# Patient Record
Sex: FEMALE | Race: WHITE | NOT HISPANIC OR LATINO
[De-identification: names, ages, dates, MRNs, and addresses within clinical notes are randomized per-mention and may not be internally consistent; named-entity substitution may affect disease eponyms.]

---

## 2017-01-16 ENCOUNTER — TRANSCRIPTION ENCOUNTER (OUTPATIENT)
Age: 67
End: 2017-01-16

## 2017-02-28 ENCOUNTER — MEDICATION RENEWAL (OUTPATIENT)
Age: 67
End: 2017-02-28

## 2017-03-06 ENCOUNTER — RX RENEWAL (OUTPATIENT)
Age: 67
End: 2017-03-06

## 2017-03-16 ENCOUNTER — EMERGENCY (EMERGENCY)
Facility: HOSPITAL | Age: 67
LOS: 1 days | Discharge: HOME | End: 2017-03-16
Attending: EMERGENCY MEDICINE | Admitting: EMERGENCY MEDICINE
Payer: COMMERCIAL

## 2017-03-16 ENCOUNTER — APPOINTMENT (OUTPATIENT)
Dept: INTERNAL MEDICINE | Facility: CLINIC | Age: 67
End: 2017-03-16

## 2017-03-16 VITALS
TEMPERATURE: 99 F | OXYGEN SATURATION: 100 % | HEART RATE: 102 BPM | HEIGHT: 62 IN | RESPIRATION RATE: 18 BRPM | SYSTOLIC BLOOD PRESSURE: 223 MMHG | DIASTOLIC BLOOD PRESSURE: 98 MMHG | WEIGHT: 251.99 LBS

## 2017-03-16 VITALS — DIASTOLIC BLOOD PRESSURE: 140 MMHG | SYSTOLIC BLOOD PRESSURE: 220 MMHG | HEART RATE: 104 BPM

## 2017-03-16 VITALS
TEMPERATURE: 97 F | OXYGEN SATURATION: 97 % | SYSTOLIC BLOOD PRESSURE: 169 MMHG | HEART RATE: 91 BPM | DIASTOLIC BLOOD PRESSURE: 82 MMHG | RESPIRATION RATE: 19 BRPM

## 2017-03-16 VITALS
TEMPERATURE: 98.8 F | BODY MASS INDEX: 46.93 KG/M2 | RESPIRATION RATE: 14 BRPM | HEART RATE: 111 BPM | WEIGHT: 255 LBS | HEIGHT: 62 IN | OXYGEN SATURATION: 97 %

## 2017-03-16 DIAGNOSIS — Z98.89 OTHER SPECIFIED POSTPROCEDURAL STATES: Chronic | ICD-10-CM

## 2017-03-16 DIAGNOSIS — M79.7 FIBROMYALGIA: ICD-10-CM

## 2017-03-16 DIAGNOSIS — M19.90 UNSPECIFIED OSTEOARTHRITIS, UNSPECIFIED SITE: ICD-10-CM

## 2017-03-16 DIAGNOSIS — I10 ESSENTIAL (PRIMARY) HYPERTENSION: ICD-10-CM

## 2017-03-16 DIAGNOSIS — Z79.84 LONG TERM (CURRENT) USE OF ORAL HYPOGLYCEMIC DRUGS: ICD-10-CM

## 2017-03-16 DIAGNOSIS — K21.9 GASTRO-ESOPHAGEAL REFLUX DISEASE WITHOUT ESOPHAGITIS: ICD-10-CM

## 2017-03-16 DIAGNOSIS — E11.9 TYPE 2 DIABETES MELLITUS WITHOUT COMPLICATIONS: ICD-10-CM

## 2017-03-16 DIAGNOSIS — I16.9 HYPERTENSIVE CRISIS, UNSPECIFIED: ICD-10-CM

## 2017-03-16 DIAGNOSIS — Z88.0 ALLERGY STATUS TO PENICILLIN: ICD-10-CM

## 2017-03-16 LAB
ALBUMIN SERPL ELPH-MCNC: 3.8 G/DL — SIGNIFICANT CHANGE UP (ref 3.3–5)
ALP SERPL-CCNC: 128 U/L — HIGH (ref 40–120)
ALT FLD-CCNC: 29 U/L — SIGNIFICANT CHANGE UP (ref 12–78)
ANION GAP SERPL CALC-SCNC: 10 MMOL/L — SIGNIFICANT CHANGE UP (ref 5–17)
APPEARANCE UR: CLEAR — SIGNIFICANT CHANGE UP
AST SERPL-CCNC: 16 U/L — SIGNIFICANT CHANGE UP (ref 15–37)
BACTERIA # UR AUTO: ABNORMAL
BASOPHILS # BLD AUTO: 0.1 K/UL — SIGNIFICANT CHANGE UP (ref 0–0.2)
BASOPHILS NFR BLD AUTO: 1.3 % — SIGNIFICANT CHANGE UP (ref 0–2)
BILIRUB SERPL-MCNC: 0.4 MG/DL — SIGNIFICANT CHANGE UP (ref 0.2–1.2)
BILIRUB UR-MCNC: NEGATIVE — SIGNIFICANT CHANGE UP
BUN SERPL-MCNC: 11 MG/DL — SIGNIFICANT CHANGE UP (ref 7–23)
CALCIUM SERPL-MCNC: 9.1 MG/DL — SIGNIFICANT CHANGE UP (ref 8.5–10.1)
CHLORIDE SERPL-SCNC: 101 MMOL/L — SIGNIFICANT CHANGE UP (ref 96–108)
CO2 SERPL-SCNC: 28 MMOL/L — SIGNIFICANT CHANGE UP (ref 22–31)
COLOR SPEC: YELLOW — SIGNIFICANT CHANGE UP
CREAT SERPL-MCNC: 0.8 MG/DL — SIGNIFICANT CHANGE UP (ref 0.5–1.3)
DIFF PNL FLD: ABNORMAL
EOSINOPHIL # BLD AUTO: 0.1 K/UL — SIGNIFICANT CHANGE UP (ref 0–0.5)
EOSINOPHIL NFR BLD AUTO: 1.1 % — SIGNIFICANT CHANGE UP (ref 0–6)
EPI CELLS # UR: SIGNIFICANT CHANGE UP
GLUCOSE SERPL-MCNC: 112 MG/DL — HIGH (ref 70–99)
GLUCOSE UR QL: NEGATIVE — SIGNIFICANT CHANGE UP
HCT VFR BLD CALC: 43 % — SIGNIFICANT CHANGE UP (ref 34.5–45)
HGB BLD-MCNC: 13.9 G/DL — SIGNIFICANT CHANGE UP (ref 11.5–15.5)
KETONES UR-MCNC: NEGATIVE — SIGNIFICANT CHANGE UP
LEUKOCYTE ESTERASE UR-ACNC: NEGATIVE — SIGNIFICANT CHANGE UP
LYMPHOCYTES # BLD AUTO: 1.7 K/UL — SIGNIFICANT CHANGE UP (ref 1–3.3)
LYMPHOCYTES # BLD AUTO: 19 % — SIGNIFICANT CHANGE UP (ref 13–44)
MCHC RBC-ENTMCNC: 28 PG — SIGNIFICANT CHANGE UP (ref 27–34)
MCHC RBC-ENTMCNC: 32.4 GM/DL — SIGNIFICANT CHANGE UP (ref 32–36)
MCV RBC AUTO: 86.4 FL — SIGNIFICANT CHANGE UP (ref 80–100)
MONOCYTES # BLD AUTO: 0.5 K/UL — SIGNIFICANT CHANGE UP (ref 0–0.9)
MONOCYTES NFR BLD AUTO: 5.5 % — SIGNIFICANT CHANGE UP (ref 1–9)
NEUTROPHILS # BLD AUTO: 6.6 K/UL — SIGNIFICANT CHANGE UP (ref 1.8–7.4)
NEUTROPHILS NFR BLD AUTO: 73 % — SIGNIFICANT CHANGE UP (ref 43–77)
NITRITE UR-MCNC: NEGATIVE — SIGNIFICANT CHANGE UP
PH UR: 6.5 — SIGNIFICANT CHANGE UP (ref 4.8–8)
PLATELET # BLD AUTO: 326 K/UL — SIGNIFICANT CHANGE UP (ref 150–400)
POTASSIUM SERPL-MCNC: 3.9 MMOL/L — SIGNIFICANT CHANGE UP (ref 3.5–5.3)
POTASSIUM SERPL-SCNC: 3.9 MMOL/L — SIGNIFICANT CHANGE UP (ref 3.5–5.3)
PROT SERPL-MCNC: 7.6 G/DL — SIGNIFICANT CHANGE UP (ref 6–8.3)
PROT UR-MCNC: NEGATIVE — SIGNIFICANT CHANGE UP
RBC # BLD: 4.98 M/UL — SIGNIFICANT CHANGE UP (ref 3.8–5.2)
RBC # FLD: 12.2 % — SIGNIFICANT CHANGE UP (ref 10.3–14.5)
RBC CASTS # UR COMP ASSIST: SIGNIFICANT CHANGE UP /HPF (ref 0–4)
SODIUM SERPL-SCNC: 139 MMOL/L — SIGNIFICANT CHANGE UP (ref 135–145)
SP GR SPEC: 1 — LOW (ref 1.01–1.02)
TROPONIN I SERPL-MCNC: <.015 NG/ML — SIGNIFICANT CHANGE UP (ref 0.01–0.04)
UROBILINOGEN FLD QL: NEGATIVE — SIGNIFICANT CHANGE UP
WBC # BLD: 9.1 K/UL — SIGNIFICANT CHANGE UP (ref 3.8–10.5)
WBC # FLD AUTO: 9.1 K/UL — SIGNIFICANT CHANGE UP (ref 3.8–10.5)
WBC UR QL: SIGNIFICANT CHANGE UP

## 2017-03-16 PROCEDURE — 93010 ELECTROCARDIOGRAM REPORT: CPT

## 2017-03-16 PROCEDURE — 71010: CPT | Mod: 26

## 2017-03-16 PROCEDURE — 70450 CT HEAD/BRAIN W/O DYE: CPT | Mod: 26

## 2017-03-16 PROCEDURE — 99285 EMERGENCY DEPT VISIT HI MDM: CPT

## 2017-03-16 RX ORDER — MOXIFLOXACIN HYDROCHLORIDE TABLETS, 400 MG 400 MG/1
400 TABLET, FILM COATED ORAL
Qty: 10 | Refills: 0 | Status: DISCONTINUED | COMMUNITY
Start: 2017-02-02

## 2017-03-16 RX ORDER — TRAMADOL HYDROCHLORIDE 50 MG/1
2 TABLET ORAL
Qty: 24 | Refills: 0 | OUTPATIENT
Start: 2017-03-16 | End: 2017-03-19

## 2017-03-16 RX ORDER — CLARITHROMYCIN 500 MG/1
500 TABLET, FILM COATED ORAL
Qty: 20 | Refills: 0 | Status: DISCONTINUED | COMMUNITY
Start: 2017-01-19

## 2017-03-16 RX ORDER — HYDRALAZINE HCL 50 MG
10 TABLET ORAL ONCE
Qty: 0 | Refills: 0 | Status: COMPLETED | OUTPATIENT
Start: 2017-03-16 | End: 2017-03-16

## 2017-03-16 RX ORDER — SODIUM CHLORIDE 9 MG/ML
1000 INJECTION INTRAMUSCULAR; INTRAVENOUS; SUBCUTANEOUS ONCE
Qty: 0 | Refills: 0 | Status: COMPLETED | OUTPATIENT
Start: 2017-03-16 | End: 2017-03-16

## 2017-03-16 RX ORDER — SODIUM CHLORIDE 9 MG/ML
3 INJECTION INTRAMUSCULAR; INTRAVENOUS; SUBCUTANEOUS ONCE
Qty: 0 | Refills: 0 | Status: COMPLETED | OUTPATIENT
Start: 2017-03-16 | End: 2017-03-16

## 2017-03-16 RX ADMIN — Medication 10 MILLIGRAM(S): at 16:57

## 2017-03-16 RX ADMIN — SODIUM CHLORIDE 2000 MILLILITER(S): 9 INJECTION INTRAMUSCULAR; INTRAVENOUS; SUBCUTANEOUS at 18:20

## 2017-03-16 RX ADMIN — SODIUM CHLORIDE 3 MILLILITER(S): 9 INJECTION INTRAMUSCULAR; INTRAVENOUS; SUBCUTANEOUS at 15:40

## 2017-03-16 RX ADMIN — Medication 0.1 MILLIGRAM(S): at 18:21

## 2017-03-16 NOTE — ED ADULT TRIAGE NOTE - CHIEF COMPLAINT QUOTE
" I went to my PMD for UTI and he found I had high blood pressure. pt with complaints of dizziness and headache.

## 2017-03-16 NOTE — ED PROVIDER NOTE - PROGRESS NOTE DETAILS
Pt headache improved with bp management, but still with pain, on exam paraspinal muscle spasm. pain meds, fu pmd tomorrow to reevaluate bp

## 2017-03-16 NOTE — ED PROVIDER NOTE - MEDICAL DECISION MAKING DETAILS
pt with severe htn, ha, check head ct, bp management, check ekg, cr. reeval pt with severe htn, ha, check head ct, bp management, check ekg, cr. reeval--bp improved, neck spasms tx with pain meds, fu pmd in am

## 2017-03-16 NOTE — ED PROVIDER NOTE - OBJECTIVE STATEMENT
Pt is a 67 yo female hx sinus dz, fibromyalgia, hypertension. pt pw one week of increasing diffuse headache, no n/v, blurred vision, numbness or weakness, no cp, sob, edema, abd pain

## 2017-03-16 NOTE — ED ADULT NURSE REASSESSMENT NOTE - NS ED NURSE REASSESS COMMENT FT1
Pt requests to take own Xanax and Tylenol, OK per Dr. Delgado
Pt. received from JAYLEN Bangura in [facility], states she has neck carrol, Vs as noted, pt medicated for pain per order

## 2017-03-16 NOTE — ED ADULT NURSE NOTE - PMH
Diabetes mellitus    Fibromyalgia    GERD (gastroesophageal reflux disease)    Hypertension    Hyponatremia    Osteoarthritis    Rhinosinusitis

## 2017-03-16 NOTE — ED PROVIDER NOTE - ENMT, MLM
Airway patent, Nasal mucosa clear. Mouth with normal mucosa. Throat has no vesicles, no oropharyngeal exudates and uvula is midline. sinus nt

## 2017-03-16 NOTE — ED PROVIDER NOTE - CHPI ED SYMPTOMS NEG
no dizziness/no confusion/no nausea/no fever/no vomiting/no loss of consciousness/no change in level of consciousness/no blurred vision

## 2017-03-17 ENCOUNTER — INPATIENT (INPATIENT)
Facility: HOSPITAL | Age: 67
LOS: 3 days | Discharge: HOME | DRG: 280 | End: 2017-03-21
Attending: HOSPITALIST | Admitting: HOSPITALIST
Payer: COMMERCIAL

## 2017-03-17 VITALS
RESPIRATION RATE: 15 BRPM | HEIGHT: 62 IN | TEMPERATURE: 97 F | SYSTOLIC BLOOD PRESSURE: 180 MMHG | HEART RATE: 90 BPM | OXYGEN SATURATION: 97 % | WEIGHT: 250 LBS | DIASTOLIC BLOOD PRESSURE: 110 MMHG

## 2017-03-17 DIAGNOSIS — Z98.89 OTHER SPECIFIED POSTPROCEDURAL STATES: Chronic | ICD-10-CM

## 2017-03-17 DIAGNOSIS — I16.1 HYPERTENSIVE EMERGENCY: ICD-10-CM

## 2017-03-17 DIAGNOSIS — E11.9 TYPE 2 DIABETES MELLITUS WITHOUT COMPLICATIONS: ICD-10-CM

## 2017-03-17 DIAGNOSIS — M79.7 FIBROMYALGIA: ICD-10-CM

## 2017-03-17 DIAGNOSIS — Z41.8 ENCOUNTER FOR OTHER PROCEDURES FOR PURPOSES OTHER THAN REMEDYING HEALTH STATE: ICD-10-CM

## 2017-03-17 DIAGNOSIS — E87.1 HYPO-OSMOLALITY AND HYPONATREMIA: ICD-10-CM

## 2017-03-17 DIAGNOSIS — K21.9 GASTRO-ESOPHAGEAL REFLUX DISEASE WITHOUT ESOPHAGITIS: ICD-10-CM

## 2017-03-17 LAB
ALBUMIN SERPL ELPH-MCNC: 3.9 G/DL — SIGNIFICANT CHANGE UP (ref 3.3–5)
ALP SERPL-CCNC: 130 U/L — HIGH (ref 40–120)
ALT FLD-CCNC: 33 U/L — SIGNIFICANT CHANGE UP (ref 12–78)
ANION GAP SERPL CALC-SCNC: 14 MMOL/L — SIGNIFICANT CHANGE UP (ref 5–17)
AST SERPL-CCNC: 23 U/L — SIGNIFICANT CHANGE UP (ref 15–37)
BASOPHILS # BLD AUTO: 0.1 K/UL — SIGNIFICANT CHANGE UP (ref 0–0.2)
BASOPHILS NFR BLD AUTO: 0.8 % — SIGNIFICANT CHANGE UP (ref 0–2)
BILIRUB SERPL-MCNC: 0.6 MG/DL — SIGNIFICANT CHANGE UP (ref 0.2–1.2)
BUN SERPL-MCNC: 12 MG/DL — SIGNIFICANT CHANGE UP (ref 7–23)
CALCIUM SERPL-MCNC: 9.2 MG/DL — SIGNIFICANT CHANGE UP (ref 8.5–10.1)
CHLORIDE SERPL-SCNC: 92 MMOL/L — LOW (ref 96–108)
CO2 SERPL-SCNC: 23 MMOL/L — SIGNIFICANT CHANGE UP (ref 22–31)
CREAT SERPL-MCNC: 0.69 MG/DL — SIGNIFICANT CHANGE UP (ref 0.5–1.3)
CULTURE RESULTS: SIGNIFICANT CHANGE UP
EOSINOPHIL # BLD AUTO: 0 K/UL — SIGNIFICANT CHANGE UP (ref 0–0.5)
EOSINOPHIL NFR BLD AUTO: 0 % — SIGNIFICANT CHANGE UP (ref 0–6)
GLUCOSE SERPL-MCNC: 171 MG/DL — HIGH (ref 70–99)
HCT VFR BLD CALC: 41.4 % — SIGNIFICANT CHANGE UP (ref 34.5–45)
HGB BLD-MCNC: 14.3 G/DL — SIGNIFICANT CHANGE UP (ref 11.5–15.5)
LYMPHOCYTES # BLD AUTO: 1.1 K/UL — SIGNIFICANT CHANGE UP (ref 1–3.3)
LYMPHOCYTES # BLD AUTO: 10.3 % — LOW (ref 13–44)
MCHC RBC-ENTMCNC: 28.8 PG — SIGNIFICANT CHANGE UP (ref 27–34)
MCHC RBC-ENTMCNC: 34.5 GM/DL — SIGNIFICANT CHANGE UP (ref 32–36)
MCV RBC AUTO: 83.4 FL — SIGNIFICANT CHANGE UP (ref 80–100)
MONOCYTES # BLD AUTO: 0.3 K/UL — SIGNIFICANT CHANGE UP (ref 0–0.9)
MONOCYTES NFR BLD AUTO: 2.9 % — SIGNIFICANT CHANGE UP (ref 1–9)
NEUTROPHILS # BLD AUTO: 9.2 K/UL — HIGH (ref 1.8–7.4)
NEUTROPHILS NFR BLD AUTO: 86 % — HIGH (ref 43–77)
PLATELET # BLD AUTO: 307 K/UL — SIGNIFICANT CHANGE UP (ref 150–400)
POTASSIUM SERPL-MCNC: 3.8 MMOL/L — SIGNIFICANT CHANGE UP (ref 3.5–5.3)
POTASSIUM SERPL-SCNC: 3.8 MMOL/L — SIGNIFICANT CHANGE UP (ref 3.5–5.3)
PROT SERPL-MCNC: 7.5 G/DL — SIGNIFICANT CHANGE UP (ref 6–8.3)
RBC # BLD: 4.96 M/UL — SIGNIFICANT CHANGE UP (ref 3.8–5.2)
RBC # FLD: 11.6 % — SIGNIFICANT CHANGE UP (ref 10.3–14.5)
SODIUM SERPL-SCNC: 129 MMOL/L — LOW (ref 135–145)
SPECIMEN SOURCE: SIGNIFICANT CHANGE UP
TROPONIN I SERPL-MCNC: <.015 NG/ML — SIGNIFICANT CHANGE UP (ref 0.01–0.04)
WBC # BLD: 10.7 K/UL — HIGH (ref 3.8–10.5)
WBC # FLD AUTO: 10.7 K/UL — HIGH (ref 3.8–10.5)

## 2017-03-17 PROCEDURE — 99223 1ST HOSP IP/OBS HIGH 75: CPT | Mod: AI,GC

## 2017-03-17 PROCEDURE — 99291 CRITICAL CARE FIRST HOUR: CPT

## 2017-03-17 PROCEDURE — 99285 EMERGENCY DEPT VISIT HI MDM: CPT

## 2017-03-17 PROCEDURE — 93010 ELECTROCARDIOGRAM REPORT: CPT

## 2017-03-17 PROCEDURE — 71010: CPT | Mod: 26

## 2017-03-17 PROCEDURE — 70450 CT HEAD/BRAIN W/O DYE: CPT | Mod: 26

## 2017-03-17 RX ORDER — SODIUM CHLORIDE 9 MG/ML
1000 INJECTION INTRAMUSCULAR; INTRAVENOUS; SUBCUTANEOUS
Qty: 0 | Refills: 0 | Status: DISCONTINUED | OUTPATIENT
Start: 2017-03-17 | End: 2017-03-17

## 2017-03-17 RX ORDER — DEXTROSE 50 % IN WATER 50 %
25 SYRINGE (ML) INTRAVENOUS ONCE
Qty: 0 | Refills: 0 | Status: DISCONTINUED | OUTPATIENT
Start: 2017-03-17 | End: 2017-03-21

## 2017-03-17 RX ORDER — ENOXAPARIN SODIUM 100 MG/ML
40 INJECTION SUBCUTANEOUS DAILY
Qty: 0 | Refills: 0 | Status: DISCONTINUED | OUTPATIENT
Start: 2017-03-17 | End: 2017-03-18

## 2017-03-17 RX ORDER — HYDRALAZINE HCL 50 MG
20 TABLET ORAL ONCE
Qty: 0 | Refills: 0 | Status: COMPLETED | OUTPATIENT
Start: 2017-03-17 | End: 2017-03-17

## 2017-03-17 RX ORDER — NICARDIPINE HYDROCHLORIDE 30 MG/1
5 CAPSULE, EXTENDED RELEASE ORAL
Qty: 50 | Refills: 0 | Status: DISCONTINUED | OUTPATIENT
Start: 2017-03-17 | End: 2017-03-18

## 2017-03-17 RX ORDER — INSULIN LISPRO 100/ML
VIAL (ML) SUBCUTANEOUS
Qty: 0 | Refills: 0 | Status: DISCONTINUED | OUTPATIENT
Start: 2017-03-17 | End: 2017-03-21

## 2017-03-17 RX ORDER — LABETALOL HCL 100 MG
200 TABLET ORAL
Qty: 0 | Refills: 0 | Status: DISCONTINUED | OUTPATIENT
Start: 2017-03-17 | End: 2017-03-18

## 2017-03-17 RX ORDER — HYDRALAZINE HCL 50 MG
50 TABLET ORAL
Qty: 0 | Refills: 0 | Status: DISCONTINUED | OUTPATIENT
Start: 2017-03-17 | End: 2017-03-18

## 2017-03-17 RX ORDER — FAMOTIDINE 10 MG/ML
20 INJECTION INTRAVENOUS ONCE
Qty: 0 | Refills: 0 | Status: COMPLETED | OUTPATIENT
Start: 2017-03-17 | End: 2017-03-17

## 2017-03-17 RX ORDER — ONDANSETRON 8 MG/1
4 TABLET, FILM COATED ORAL ONCE
Qty: 0 | Refills: 0 | Status: COMPLETED | OUTPATIENT
Start: 2017-03-17 | End: 2017-03-17

## 2017-03-17 RX ORDER — SODIUM CHLORIDE 9 MG/ML
1000 INJECTION, SOLUTION INTRAVENOUS
Qty: 0 | Refills: 0 | Status: DISCONTINUED | OUTPATIENT
Start: 2017-03-17 | End: 2017-03-21

## 2017-03-17 RX ORDER — GLUCAGON INJECTION, SOLUTION 0.5 MG/.1ML
1 INJECTION, SOLUTION SUBCUTANEOUS ONCE
Qty: 0 | Refills: 0 | Status: DISCONTINUED | OUTPATIENT
Start: 2017-03-17 | End: 2017-03-21

## 2017-03-17 RX ORDER — SODIUM CHLORIDE 9 MG/ML
3 INJECTION INTRAMUSCULAR; INTRAVENOUS; SUBCUTANEOUS ONCE
Qty: 0 | Refills: 0 | Status: COMPLETED | OUTPATIENT
Start: 2017-03-17 | End: 2017-03-17

## 2017-03-17 RX ORDER — MORPHINE SULFATE 50 MG/1
4 CAPSULE, EXTENDED RELEASE ORAL ONCE
Qty: 0 | Refills: 0 | Status: DISCONTINUED | OUTPATIENT
Start: 2017-03-17 | End: 2017-03-17

## 2017-03-17 RX ORDER — DEXTROSE 50 % IN WATER 50 %
12.5 SYRINGE (ML) INTRAVENOUS ONCE
Qty: 0 | Refills: 0 | Status: DISCONTINUED | OUTPATIENT
Start: 2017-03-17 | End: 2017-03-21

## 2017-03-17 RX ORDER — DEXTROSE 50 % IN WATER 50 %
1 SYRINGE (ML) INTRAVENOUS ONCE
Qty: 0 | Refills: 0 | Status: DISCONTINUED | OUTPATIENT
Start: 2017-03-17 | End: 2017-03-21

## 2017-03-17 RX ADMIN — FAMOTIDINE 20 MILLIGRAM(S): 10 INJECTION INTRAVENOUS at 16:01

## 2017-03-17 RX ADMIN — Medication 1 MILLIGRAM(S): at 16:01

## 2017-03-17 RX ADMIN — ONDANSETRON 4 MILLIGRAM(S): 8 TABLET, FILM COATED ORAL at 16:02

## 2017-03-17 RX ADMIN — Medication 0.2 MILLIGRAM(S): at 16:04

## 2017-03-17 RX ADMIN — SODIUM CHLORIDE 125 MILLILITER(S): 9 INJECTION INTRAMUSCULAR; INTRAVENOUS; SUBCUTANEOUS at 16:02

## 2017-03-17 RX ADMIN — NICARDIPINE HYDROCHLORIDE 25 MG/HR: 30 CAPSULE, EXTENDED RELEASE ORAL at 16:45

## 2017-03-17 RX ADMIN — Medication 200 MILLIGRAM(S): at 22:13

## 2017-03-17 RX ADMIN — Medication 20 MILLIGRAM(S): at 16:02

## 2017-03-17 RX ADMIN — Medication 50 MILLIGRAM(S): at 23:55

## 2017-03-17 RX ADMIN — SODIUM CHLORIDE 3 MILLILITER(S): 9 INJECTION INTRAMUSCULAR; INTRAVENOUS; SUBCUTANEOUS at 15:39

## 2017-03-17 NOTE — H&P ADULT. - RS GEN PE MLT RESP DETAILS PC
normal/respirations non-labored/airway patent/no chest wall tenderness/no wheezes/breath sounds equal

## 2017-03-17 NOTE — ED ADULT NURSE REASSESSMENT NOTE - NS ED NURSE REASSESS COMMENT FT1
Pt is drowsy but responds to her name easily. Pt updated to her ICU status and states ok. Pt's 's. HR 90's Pt remains on 2L NC 02 with 02 sat' s 98%. Pt denies c/o at present time. PIV #20g to her LH intact and patent with Cardene infusion running at 37.5 ml's/ 7.5 mg/hr. Pt to go for CT of Head and than to ICU bed 16.

## 2017-03-17 NOTE — H&P ADULT. - HISTORY OF PRESENT ILLNESS
64 yo F with a PMHx of: Morbidly obese with DM2, HTN, GERD, Fibromyalgia, OA, Chronic rhinosinusitis and hyponatremia presents to the ED today with her brother for lethargy and weakness.  The pt was very lethargic when examined so unable to obtain complete full HPI/ ROS from the patient- her brother was at bedside and provided most of the history.  The patient stated that she has a mild HA, feels generalized weakness, and very sleepy.  The pt stated that she doesn't have any CP, SOB, abdominal pain, N/V/D/C, LE edema or any other issues.  The patient states that she had 64 yo F with a PMHx of: Morbidly obese with DM2, HTN, GERD, Fibromyalgia, OA, Chronic rhinosinusitis and hyponatremia presents to the ED today with her brother for lethargy and weakness.  The pt was very lethargic when examined so unable to obtain complete full HPI/ ROS from the patient- her brother was at bedside and provided most of the history.  The patients brother  stated that she has a mild HA, feels generalized weakness, and very sleepy since yesterday.  The pts brother stated that the pt had been to the [facility] ED on  3/16/17 for HTN and was discharged the same day with a prescription for Toradol and needed to follow.  The pt stated that she doesn't have any CP, SOB, abdominal pain, N/V/D/C, LE edema or any other issues.    ED Vitals were: 97.5 F, 90 HR, 180/110 BP, 15 RA, 97% SpO2 RA  ED Labs: 64 yo F with a PMHx of: Morbidly obese with DM2, HTN, GERD, Fibromyalgia, OA, Chronic rhinosinusitis and hyponatremia presents to the ED today with her brother for lethargy and weakness.  The pt was very lethargic when examined so unable to obtain complete full HPI/ ROS from the patient- her brother was at bedside and provided most of the history.  The patients brother  stated that she has a mild HA, feels generalized weakness, and very sleepy since yesterday.  The pts brother stated that the pt had been to the [facility] ED on  3/16/17 for HTN and was discharged the same day with a prescription for Toradol and needed to follow.  The pt stated that she doesn't have any CP, SOB, abdominal pain, N/V/D/C, LE edema or any other issues.    ED Vitals were: 97.5 F, 90 HR, 180/110 BP, 15 RA, 97% SpO2 RA  ED Labs: WBC: 10.7; Na+: 129; glucose: 171; alk phos: 130  UA: neg nitrite, neg leuk esterase  CXR: Early CHF or fluid overload. No focal infiltrate  In ED, pt tx for HTN emergency and given: NS INF 125cc/hr, Hydralazine 50mg 4x/day, Labetalol 200mg BID, Nicardipine: 50mg 25cc/hr, CT head pending, TTE ordered.     CXR 62 yo F with a PMHx of: Morbidly obese with DM2, HTN, GERD, Fibromyalgia, OA, Chronic rhinosinusitis and hyponatremia presents to the ED today with her brother for lethargy and weakness.  The pt was very lethargic when examined so unable to obtain complete full HPI/ ROS from the patient- her brother was at bedside and provided most of the history.  The patients brother  stated that she has a mild HA, feels generalized weakness, and very sleepy since yesterday.  The pts brother stated that the pt had been to the [facility] ED on  3/16/17 for HTN and was discharged the same day with a prescription for Toradol and needed to followup with her PMD.  The pt stated that she doesn't have any Fever, Chills, Blurry vision, CP, SOB, abdominal pain, N/V/D/C, LE edema or any other issues. But history unreliable since pt was lethargic.   ED Vitals were: 97.5 F, 90 HR, 180/110 BP, 15 RA, 97% SpO2 RA  ED Labs: WBC: 10.7; Na+: 129; glucose: 171; alk phos: 130  UA: Neg nitrite, Neg leuk esterase   CXR: Early CHF or fluid overload. No focal infiltrate  In ED, pt tx for HTN emergency and given: NS INF 125cc/hr, Hydralazine 50mg 4x/day, Labetalol 200mg BID, Nicardipine: 50mg 25cc/hr, CT head pending, TTE ordered. 64 yo F with a PMHx of: Morbidly obese with DM2, HTN, GERD, Fibromyalgia, OA, Chronic rhinosinusitis and hyponatremia presents to the ED today with her brother for lethargy and weakness.  The pt was very lethargic when examined so unable to obtain complete full HPI/ ROS from the patient- her brother was at bedside and provided limited  history.  The patients brother  stated that she has a mild HA, feels generalized weakness, and very sleepy since yesterday.  The pts brother stated that the pt had been to the [facility] ED on  3/16/17 for HTN and was discharged the same day with a prescription for Toradol and needed to followup with her PMD.  The pt stated that she doesn't have any Fever, Chills, Blurry vision, CP, SOB, abdominal pain, N/V/D/C, LE edema or any other issues. But history unreliable since pt was very lethargic.   ED Vitals were: 97.5 F, 90 HR, 180/110 BP, 15 RA, 97% SpO2 RA  ED Labs: WBC: 10.7; Na+: 129; glucose: 171; alk phos: 130  UA: Neg nitrite, Neg leuk esterase   CXR: Early CHF or fluid overload. No focal infiltrate  In ED, pt tx for HTN emergency and given: NS INF 125cc/hr, Hydralazine 50mg 4x/day, Labetalol 200mg BID, Nicardipine: 50mg 25cc/hr, CT head pending, TTE ordered.

## 2017-03-17 NOTE — H&P ADULT. - PROBLEM SELECTOR PLAN 4
1. Continue care as per ICU reccs 1. Continue AC as per ICU reccs for med choice 1. Patient has a history of chronic hyponatremia--unknown etiology.   2. Monitor BMP in AM

## 2017-03-17 NOTE — ED PROVIDER NOTE - CRITICAL CARE PROVIDED
additional history taking/consultation with other physicians/documentation/interpretation of diagnostic studies/direct patient care (not related to procedure)/consult w/ pt's family directly relating to pts condition

## 2017-03-17 NOTE — ED PROVIDER NOTE - OBJECTIVE STATEMENT
headache.  pt was seen here yesterday for same complaint.  pmd- J Sedrick.  vomiting x 14 since 2 am

## 2017-03-17 NOTE — H&P ADULT. - NEGATIVE GASTROINTESTINAL SYMPTOMS
no constipation/no abdominal pain/no vomiting/no change in bowel habits/no hematochezia/no diarrhea/no melena/no flatulence/no nausea

## 2017-03-17 NOTE — H&P ADULT. - PROBLEM SELECTOR PLAN 2
1. Continue care as per ICU reccs 1. Patients home med Metformin held; continue ISS and hypoglycemic protocol with daily Accu-Cheks.

## 2017-03-17 NOTE — H&P ADULT. - PROBLEM SELECTOR PLAN 1
1. Pt being admitted to ICU for HTN Emergency 1. Pt being admitted to ICU for HTN emergency treatment, 1. Pt being admitted to ICU for HTN emergency treatment, continue NS INF 125cc/hr, Hydralazine 50mg 4x/day, Labetalol 200mg BID, Nicardipine: 50mg 25cc/hr, CT head pending, TTE ordered.  2. f/u AM CBC and BMP  3. f/u Cardio reccs in AM 1. Pt being admitted to ICU for HTN emergency treatment, continue NS INF 125cc/hr, Hydralazine 50mg 4x/day, Labetalol 200mg BID, Nicardipine: 50mg 25cc/hr, CT head pending, TTE ordered.   2. f/u AM CBC and BMP, and additional labs   3. f/u Cardio reccs in AM  4. Continue Neuro checks routinely for lethargic mental status-see if improving

## 2017-03-17 NOTE — H&P ADULT. - NEGATIVE CARDIOVASCULAR SYMPTOMS
no peripheral edema/no paroxysmal nocturnal dyspnea/no chest pain/no palpitations/no claudication/no dyspnea on exertion

## 2017-03-17 NOTE — H&P ADULT. - ASSESSMENT
62 yo F with a PMHx of: Morbidly obese with DM2, HTN, GERD, Fibromyalgia, OA, Chronic rhinosinusitis and hyponatremia; pt being admitted to ICU for HTN emergency:

## 2017-03-17 NOTE — ED PROVIDER NOTE - PROGRESS NOTE DETAILS
pt is feeling better and resting comfortably. case maritza Davila (ICU) case maritza Montemayor and STANLEY Barrera (hospitalist)

## 2017-03-17 NOTE — H&P ADULT. - CONSTITUTIONAL DETAILS
well-groomed/respiratory distress/obese obese/respiratory distress/well-groomed/pt is very lethargic

## 2017-03-17 NOTE — PATIENT PROFILE ADULT. - PMH
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

## 2017-03-17 NOTE — H&P ADULT. - ATTENDING COMMENTS
cc HA x 2-3 days  HPI (Essential HTN, DM II, Morbid Obesity BMI 45, Hypothyroidism)  Presents with recurrent HA and FTT. Seen in ED with sig HTN and sent home with bp meds after monitoring with bp improvement.   Re-presents with worsening FTT, HA and found to have /115 and acute CXR consistent with acute cephalization, bat wing pulm hilar fullness and cardiothoracic ratio > 50% measured cxr reviewed and viewed  EKG LAD with LVH no acute changes.   MARCIO (-) x 1.   started cardene drip in ED due refractory htn, ha.  ICU consulted from ED   CT head ordered pending   lethargic.   completed ROS, all others are negative  info obtained from brother at bedside   PMH, PSH,  Social hx, fam hx, med list   VS vitals.   gen lethargic   neuro non-focal   pulm reduced bi   cvs  rrr s1-s2 no murmurs no le edema no jvd    labs reviewed, ekg and cxr reviewed   A/P:   HTN emergency with AMS and acute flash pulm edema, CHF.   Admit ICU   Continue Cardene drip with ICU level BP management  BP check on both arms   Check CT head r/o  ICH patient is very lethargic  TTE   needs CRUZ-OHS anthony once stable   High risk for morbidity, mortality, secondary to the above mentioned medical co-morbid conditions cc HA x 2-3 days  HPI (Essential HTN, DM II, Morbid Obesity BMI 45, Hypothyroidism)  Presents with recurrent HA and FTT. Seen in ED with sig HTN and sent home with bp meds after monitoring with bp improvement.   Re-presents with worsening FTT, HA and found to have /115 and acute CXR consistent with acute cephalization, bat wing pulm hilar fullness and cardiothoracic ratio > 50% measured cxr reviewed and personally viewed  EKG LAD with LVH no acute changes.   MARCIO (-) x 1.   started cardene drip in ED due refractory htn, ha.  ICU consulted from ED   CT head ordered pending   lethargic.   completed ROS, all others are negative  info obtained from brother at bedside   PMH, PSH,  Social hx, fam hx, med list   VS vitals.   gen lethargic   neuro non-focal   pulm reduced bi   cvs  rrr s1-s2 no murmurs no le edema no jvd    labs reviewed, ekg and cxr reviewed   A/P:   HTN emergency with AMS and acute flash pulm edema, CHF.   Admit ICU   Continue Cardene drip with ICU level BP management  BP check on both arms   Check CT head r/o  ICH patient is very lethargic  TTE   needs CRUZ-OHS anthony once stable   High risk for morbidity, mortality, secondary to the above mentioned medical co-morbid conditions

## 2017-03-18 LAB
ANION GAP SERPL CALC-SCNC: 13 MMOL/L — SIGNIFICANT CHANGE UP (ref 5–17)
APPEARANCE UR: CLEAR — SIGNIFICANT CHANGE UP
APTT BLD: 27.7 SEC — SIGNIFICANT CHANGE UP (ref 27.5–37.4)
BACTERIA # UR AUTO: ABNORMAL
BASOPHILS # BLD AUTO: 0.1 K/UL — SIGNIFICANT CHANGE UP (ref 0–0.2)
BASOPHILS NFR BLD AUTO: 0.5 % — SIGNIFICANT CHANGE UP (ref 0–2)
BILIRUB UR-MCNC: NEGATIVE — SIGNIFICANT CHANGE UP
BUN SERPL-MCNC: 13 MG/DL — SIGNIFICANT CHANGE UP (ref 7–23)
CALCIUM SERPL-MCNC: 8.2 MG/DL — LOW (ref 8.5–10.1)
CHLORIDE SERPL-SCNC: 93 MMOL/L — LOW (ref 96–108)
CK MB BLD-MCNC: 2.6 % — SIGNIFICANT CHANGE UP (ref 0–3.5)
CK MB BLD-MCNC: 4.2 % — HIGH (ref 0–3.5)
CK MB CFR SERPL CALC: 3.2 NG/ML — SIGNIFICANT CHANGE UP (ref 0–3.6)
CK MB CFR SERPL CALC: 6.2 NG/ML — HIGH (ref 0–3.6)
CK SERPL-CCNC: 124 U/L — SIGNIFICANT CHANGE UP (ref 26–192)
CK SERPL-CCNC: 149 U/L — SIGNIFICANT CHANGE UP (ref 26–192)
CO2 SERPL-SCNC: 24 MMOL/L — SIGNIFICANT CHANGE UP (ref 22–31)
COLOR SPEC: YELLOW — SIGNIFICANT CHANGE UP
CREAT ?TM UR-MCNC: 125 MG/DL — SIGNIFICANT CHANGE UP
CREAT SERPL-MCNC: 0.69 MG/DL — SIGNIFICANT CHANGE UP (ref 0.5–1.3)
DIFF PNL FLD: ABNORMAL
EOSINOPHIL # BLD AUTO: 0 K/UL — SIGNIFICANT CHANGE UP (ref 0–0.5)
EOSINOPHIL NFR BLD AUTO: 0.1 % — SIGNIFICANT CHANGE UP (ref 0–6)
EPI CELLS # UR: ABNORMAL
GLUCOSE SERPL-MCNC: 157 MG/DL — HIGH (ref 70–99)
GLUCOSE UR QL: 100 MG/DL
HBA1C BLD-MCNC: 7.2 % — HIGH (ref 4–5.6)
HCT VFR BLD CALC: 37.4 % — SIGNIFICANT CHANGE UP (ref 34.5–45)
HGB BLD-MCNC: 12.3 G/DL — SIGNIFICANT CHANGE UP (ref 11.5–15.5)
INR BLD: 1.08 RATIO — SIGNIFICANT CHANGE UP (ref 0.88–1.16)
KETONES UR-MCNC: ABNORMAL
LEUKOCYTE ESTERASE UR-ACNC: ABNORMAL
LYMPHOCYTES # BLD AUTO: 1.4 K/UL — SIGNIFICANT CHANGE UP (ref 1–3.3)
LYMPHOCYTES # BLD AUTO: 11.5 % — LOW (ref 13–44)
MCHC RBC-ENTMCNC: 28.5 PG — SIGNIFICANT CHANGE UP (ref 27–34)
MCHC RBC-ENTMCNC: 32.8 GM/DL — SIGNIFICANT CHANGE UP (ref 32–36)
MCV RBC AUTO: 86.8 FL — SIGNIFICANT CHANGE UP (ref 80–100)
MONOCYTES # BLD AUTO: 0.6 K/UL — SIGNIFICANT CHANGE UP (ref 0–0.9)
MONOCYTES NFR BLD AUTO: 4.9 % — SIGNIFICANT CHANGE UP (ref 1–9)
NEUTROPHILS # BLD AUTO: 9.9 K/UL — HIGH (ref 1.8–7.4)
NEUTROPHILS NFR BLD AUTO: 83 % — HIGH (ref 43–77)
NITRITE UR-MCNC: NEGATIVE — SIGNIFICANT CHANGE UP
OSMOLALITY UR: 659 MOS/KG — SIGNIFICANT CHANGE UP (ref 50–1200)
PH UR: 5 — SIGNIFICANT CHANGE UP (ref 4.8–8)
PLATELET # BLD AUTO: 278 K/UL — SIGNIFICANT CHANGE UP (ref 150–400)
POTASSIUM SERPL-MCNC: 3.6 MMOL/L — SIGNIFICANT CHANGE UP (ref 3.5–5.3)
POTASSIUM SERPL-SCNC: 3.6 MMOL/L — SIGNIFICANT CHANGE UP (ref 3.5–5.3)
PROT UR-MCNC: 75 MG/DL
PROTHROM AB SERPL-ACNC: 12 SEC — SIGNIFICANT CHANGE UP (ref 10–13.1)
RBC # BLD: 4.31 M/UL — SIGNIFICANT CHANGE UP (ref 3.8–5.2)
RBC # FLD: 12.3 % — SIGNIFICANT CHANGE UP (ref 10.3–14.5)
RBC CASTS # UR COMP ASSIST: SIGNIFICANT CHANGE UP /HPF (ref 0–4)
SODIUM SERPL-SCNC: 130 MMOL/L — LOW (ref 135–145)
SODIUM UR-SCNC: 108 MMOL/L — SIGNIFICANT CHANGE UP
SP GR SPEC: 1.02 — SIGNIFICANT CHANGE UP (ref 1.01–1.02)
TROPONIN I SERPL-MCNC: 0.74 NG/ML — HIGH (ref 0.01–0.04)
TROPONIN I SERPL-MCNC: 0.95 NG/ML — HIGH (ref 0.01–0.04)
TROPONIN I SERPL-MCNC: 1.3 NG/ML — HIGH (ref 0.01–0.04)
TROPONIN I SERPL-MCNC: 1.4 NG/ML — HIGH (ref 0.01–0.04)
UROBILINOGEN FLD QL: NEGATIVE — SIGNIFICANT CHANGE UP
WBC # BLD: 11.9 K/UL — HIGH (ref 3.8–10.5)
WBC # FLD AUTO: 11.9 K/UL — HIGH (ref 3.8–10.5)
WBC UR QL: SIGNIFICANT CHANGE UP

## 2017-03-18 PROCEDURE — 99233 SBSQ HOSP IP/OBS HIGH 50: CPT

## 2017-03-18 PROCEDURE — 93010 ELECTROCARDIOGRAM REPORT: CPT

## 2017-03-18 PROCEDURE — 93306 TTE W/DOPPLER COMPLETE: CPT | Mod: 26

## 2017-03-18 RX ORDER — ATORVASTATIN CALCIUM 80 MG/1
40 TABLET, FILM COATED ORAL AT BEDTIME
Qty: 0 | Refills: 0 | Status: DISCONTINUED | OUTPATIENT
Start: 2017-03-18 | End: 2017-03-21

## 2017-03-18 RX ORDER — ENOXAPARIN SODIUM 100 MG/ML
40 INJECTION SUBCUTANEOUS EVERY 12 HOURS
Qty: 0 | Refills: 0 | Status: DISCONTINUED | OUTPATIENT
Start: 2017-03-18 | End: 2017-03-21

## 2017-03-18 RX ORDER — PANTOPRAZOLE SODIUM 20 MG/1
40 TABLET, DELAYED RELEASE ORAL
Qty: 0 | Refills: 0 | Status: DISCONTINUED | OUTPATIENT
Start: 2017-03-18 | End: 2017-03-21

## 2017-03-18 RX ORDER — POTASSIUM CHLORIDE 20 MEQ
40 PACKET (EA) ORAL ONCE
Qty: 0 | Refills: 0 | Status: COMPLETED | OUTPATIENT
Start: 2017-03-18 | End: 2017-03-18

## 2017-03-18 RX ORDER — LABETALOL HCL 100 MG
300 TABLET ORAL
Qty: 0 | Refills: 0 | Status: DISCONTINUED | OUTPATIENT
Start: 2017-03-18 | End: 2017-03-21

## 2017-03-18 RX ORDER — HYDRALAZINE HCL 50 MG
25 TABLET ORAL EVERY 6 HOURS
Qty: 0 | Refills: 0 | Status: DISCONTINUED | OUTPATIENT
Start: 2017-03-18 | End: 2017-03-20

## 2017-03-18 RX ORDER — MORPHINE SULFATE 50 MG/1
4 CAPSULE, EXTENDED RELEASE ORAL EVERY 4 HOURS
Qty: 0 | Refills: 0 | Status: DISCONTINUED | OUTPATIENT
Start: 2017-03-18 | End: 2017-03-21

## 2017-03-18 RX ORDER — LEVOTHYROXINE SODIUM 125 MCG
50 TABLET ORAL DAILY
Qty: 0 | Refills: 0 | Status: DISCONTINUED | OUTPATIENT
Start: 2017-03-18 | End: 2017-03-21

## 2017-03-18 RX ORDER — FLUTICASONE PROPIONATE 50 MCG
1 SPRAY, SUSPENSION NASAL
Qty: 0 | Refills: 0 | Status: DISCONTINUED | OUTPATIENT
Start: 2017-03-18 | End: 2017-03-21

## 2017-03-18 RX ORDER — HYDRALAZINE HCL 50 MG
25 TABLET ORAL EVERY 6 HOURS
Qty: 0 | Refills: 0 | Status: DISCONTINUED | OUTPATIENT
Start: 2017-03-18 | End: 2017-03-18

## 2017-03-18 RX ORDER — ASPIRIN/CALCIUM CARB/MAGNESIUM 324 MG
325 TABLET ORAL DAILY
Qty: 0 | Refills: 0 | Status: DISCONTINUED | OUTPATIENT
Start: 2017-03-18 | End: 2017-03-18

## 2017-03-18 RX ORDER — ONDANSETRON 8 MG/1
4 TABLET, FILM COATED ORAL EVERY 8 HOURS
Qty: 0 | Refills: 0 | Status: DISCONTINUED | OUTPATIENT
Start: 2017-03-18 | End: 2017-03-21

## 2017-03-18 RX ORDER — ACETAMINOPHEN 500 MG
650 TABLET ORAL EVERY 6 HOURS
Qty: 0 | Refills: 0 | Status: DISCONTINUED | OUTPATIENT
Start: 2017-03-18 | End: 2017-03-21

## 2017-03-18 RX ORDER — ASPIRIN/CALCIUM CARB/MAGNESIUM 324 MG
81 TABLET ORAL DAILY
Qty: 0 | Refills: 0 | Status: DISCONTINUED | OUTPATIENT
Start: 2017-03-18 | End: 2017-03-21

## 2017-03-18 RX ADMIN — ATORVASTATIN CALCIUM 40 MILLIGRAM(S): 80 TABLET, FILM COATED ORAL at 10:17

## 2017-03-18 RX ADMIN — PANTOPRAZOLE SODIUM 40 MILLIGRAM(S): 20 TABLET, DELAYED RELEASE ORAL at 22:03

## 2017-03-18 RX ADMIN — Medication 50 MICROGRAM(S): at 18:08

## 2017-03-18 RX ADMIN — ENOXAPARIN SODIUM 40 MILLIGRAM(S): 100 INJECTION SUBCUTANEOUS at 18:08

## 2017-03-18 RX ADMIN — Medication 25 MILLIGRAM(S): at 18:08

## 2017-03-18 RX ADMIN — MORPHINE SULFATE 4 MILLIGRAM(S): 50 CAPSULE, EXTENDED RELEASE ORAL at 08:32

## 2017-03-18 RX ADMIN — Medication 1: at 08:06

## 2017-03-18 RX ADMIN — ATORVASTATIN CALCIUM 40 MILLIGRAM(S): 80 TABLET, FILM COATED ORAL at 22:03

## 2017-03-18 RX ADMIN — Medication 40 MILLIEQUIVALENT(S): at 10:16

## 2017-03-18 RX ADMIN — Medication 25 MILLIGRAM(S): at 12:16

## 2017-03-18 RX ADMIN — ONDANSETRON 4 MILLIGRAM(S): 8 TABLET, FILM COATED ORAL at 19:42

## 2017-03-18 RX ADMIN — Medication 25 MILLIGRAM(S): at 06:22

## 2017-03-18 RX ADMIN — MORPHINE SULFATE 2 MILLIGRAM(S): 50 CAPSULE, EXTENDED RELEASE ORAL at 08:08

## 2017-03-18 RX ADMIN — Medication 1 SPRAY(S): at 22:24

## 2017-03-18 RX ADMIN — Medication 1: at 12:17

## 2017-03-18 RX ADMIN — Medication 325 MILLIGRAM(S): at 02:18

## 2017-03-18 RX ADMIN — ENOXAPARIN SODIUM 40 MILLIGRAM(S): 100 INJECTION SUBCUTANEOUS at 00:10

## 2017-03-18 RX ADMIN — Medication 300 MILLIGRAM(S): at 06:22

## 2017-03-18 RX ADMIN — Medication 325 MILLIGRAM(S): at 12:16

## 2017-03-18 RX ADMIN — Medication 300 MILLIGRAM(S): at 18:08

## 2017-03-18 RX ADMIN — ONDANSETRON 4 MILLIGRAM(S): 8 TABLET, FILM COATED ORAL at 08:08

## 2017-03-18 NOTE — DIETITIAN INITIAL EVALUATION ADULT. - NS AS NUTRI INTERV MEALS SNACK
When medically feasible recommend clear liquid consistent carb diet & advance as tolerated to Consistent Carb, Dash/TLC.

## 2017-03-18 NOTE — DIETITIAN INITIAL EVALUATION ADULT. - NUTRITIONGOAL OUTCOME1
Pt to comply w/ above therapeutic diet for optimal nutritional status &to facilitate gradual wt loss

## 2017-03-18 NOTE — DIETITIAN INITIAL EVALUATION ADULT. - PROBLEM SELECTOR PLAN 1
1. Pt being admitted to ICU for HTN emergency treatment, continue NS INF 125cc/hr, Hydralazine 50mg 4x/day, Labetalol 200mg BID, Nicardipine: 50mg 25cc/hr, CT head pending, TTE ordered.   2. f/u AM CBC and BMP, and additional labs   3. f/u Cardio reccs in AM  4. Continue Neuro checks routinely for lethargic mental status-see if improving

## 2017-03-18 NOTE — DIETITIAN INITIAL EVALUATION ADULT. - OTHER INFO
Pt A+O at time of visit. Hypertensive emergency on admission. Pt w/ severe nausea & headache/neck pain at time of visit- conversation kept brief. Poor po intake past 3 days. +N/V. Last BM 3/16. Hx obesity/DM. Metformin pta. Will remain available for diet education when medically appropriate.

## 2017-03-19 ENCOUNTER — TRANSCRIPTION ENCOUNTER (OUTPATIENT)
Age: 67
End: 2017-03-19

## 2017-03-19 LAB
ANION GAP SERPL CALC-SCNC: 12 MMOL/L — SIGNIFICANT CHANGE UP (ref 5–17)
BUN SERPL-MCNC: 15 MG/DL — SIGNIFICANT CHANGE UP (ref 7–23)
CALCIUM SERPL-MCNC: 8.2 MG/DL — LOW (ref 8.5–10.1)
CHLORIDE SERPL-SCNC: 94 MMOL/L — LOW (ref 96–108)
CO2 SERPL-SCNC: 25 MMOL/L — SIGNIFICANT CHANGE UP (ref 22–31)
CREAT SERPL-MCNC: 0.76 MG/DL — SIGNIFICANT CHANGE UP (ref 0.5–1.3)
GLUCOSE SERPL-MCNC: 138 MG/DL — HIGH (ref 70–99)
HCT VFR BLD CALC: 35.6 % — SIGNIFICANT CHANGE UP (ref 34.5–45)
HGB BLD-MCNC: 11.7 G/DL — SIGNIFICANT CHANGE UP (ref 11.5–15.5)
MAGNESIUM SERPL-MCNC: 2 MG/DL — SIGNIFICANT CHANGE UP (ref 1.8–2.4)
MCHC RBC-ENTMCNC: 28.5 PG — SIGNIFICANT CHANGE UP (ref 27–34)
MCHC RBC-ENTMCNC: 32.7 GM/DL — SIGNIFICANT CHANGE UP (ref 32–36)
MCV RBC AUTO: 87.2 FL — SIGNIFICANT CHANGE UP (ref 80–100)
PHOSPHATE SERPL-MCNC: 3.3 MG/DL — SIGNIFICANT CHANGE UP (ref 2.5–4.5)
PLATELET # BLD AUTO: 276 K/UL — SIGNIFICANT CHANGE UP (ref 150–400)
POTASSIUM SERPL-MCNC: 4 MMOL/L — SIGNIFICANT CHANGE UP (ref 3.5–5.3)
POTASSIUM SERPL-SCNC: 4 MMOL/L — SIGNIFICANT CHANGE UP (ref 3.5–5.3)
RBC # BLD: 4.09 M/UL — SIGNIFICANT CHANGE UP (ref 3.8–5.2)
RBC # FLD: 12.5 % — SIGNIFICANT CHANGE UP (ref 10.3–14.5)
SODIUM SERPL-SCNC: 131 MMOL/L — LOW (ref 135–145)
WBC # BLD: 8.6 K/UL — SIGNIFICANT CHANGE UP (ref 3.8–10.5)
WBC # FLD AUTO: 8.6 K/UL — SIGNIFICANT CHANGE UP (ref 3.8–10.5)

## 2017-03-19 PROCEDURE — 99233 SBSQ HOSP IP/OBS HIGH 50: CPT

## 2017-03-19 RX ORDER — ALPRAZOLAM 0.25 MG
0.25 TABLET ORAL ONCE
Qty: 0 | Refills: 0 | Status: DISCONTINUED | OUTPATIENT
Start: 2017-03-19 | End: 2017-03-19

## 2017-03-19 RX ORDER — ALPRAZOLAM 0.25 MG
0.25 TABLET ORAL ONCE
Qty: 0 | Refills: 0 | Status: DISCONTINUED | OUTPATIENT
Start: 2017-03-19 | End: 2017-03-20

## 2017-03-19 RX ADMIN — Medication 25 MILLIGRAM(S): at 12:16

## 2017-03-19 RX ADMIN — Medication 300 MILLIGRAM(S): at 06:42

## 2017-03-19 RX ADMIN — Medication 25 MILLIGRAM(S): at 00:02

## 2017-03-19 RX ADMIN — Medication 81 MILLIGRAM(S): at 12:16

## 2017-03-19 RX ADMIN — ENOXAPARIN SODIUM 40 MILLIGRAM(S): 100 INJECTION SUBCUTANEOUS at 06:43

## 2017-03-19 RX ADMIN — PANTOPRAZOLE SODIUM 40 MILLIGRAM(S): 20 TABLET, DELAYED RELEASE ORAL at 06:42

## 2017-03-19 RX ADMIN — Medication 50 MICROGRAM(S): at 06:42

## 2017-03-19 RX ADMIN — MORPHINE SULFATE 4 MILLIGRAM(S): 50 CAPSULE, EXTENDED RELEASE ORAL at 00:20

## 2017-03-19 RX ADMIN — Medication 25 MILLIGRAM(S): at 18:11

## 2017-03-19 RX ADMIN — ONDANSETRON 4 MILLIGRAM(S): 8 TABLET, FILM COATED ORAL at 14:45

## 2017-03-19 RX ADMIN — Medication 1: at 12:18

## 2017-03-19 RX ADMIN — MORPHINE SULFATE 4 MILLIGRAM(S): 50 CAPSULE, EXTENDED RELEASE ORAL at 08:35

## 2017-03-19 RX ADMIN — Medication 25 MILLIGRAM(S): at 06:42

## 2017-03-19 RX ADMIN — Medication 300 MILLIGRAM(S): at 18:11

## 2017-03-19 RX ADMIN — ATORVASTATIN CALCIUM 40 MILLIGRAM(S): 80 TABLET, FILM COATED ORAL at 21:26

## 2017-03-19 RX ADMIN — MORPHINE SULFATE 4 MILLIGRAM(S): 50 CAPSULE, EXTENDED RELEASE ORAL at 08:19

## 2017-03-19 RX ADMIN — ENOXAPARIN SODIUM 40 MILLIGRAM(S): 100 INJECTION SUBCUTANEOUS at 18:11

## 2017-03-19 RX ADMIN — Medication 1: at 08:18

## 2017-03-19 RX ADMIN — MORPHINE SULFATE 4 MILLIGRAM(S): 50 CAPSULE, EXTENDED RELEASE ORAL at 00:02

## 2017-03-19 NOTE — DISCHARGE NOTE ADULT - MEDICATION SUMMARY - MEDICATIONS TO CHANGE
I will SWITCH the dose or number of times a day I take the medications listed below when I get home from the hospital:    enalapril 20 mg oral tablet  --  by mouth 2 times a day    traMADol 50 mg oral tablet  -- 2 tab(s) by mouth every 6 hours, As Needed -for severe pain MDD:8  -- Caution federal law prohibits the transfer of this drug to any person other  than the person for whom it was prescribed.  May cause drowsiness.  Alcohol may intensify this effect.  Use care when operating dangerous machinery.  Obtain medical advice before taking any non-prescription drugs as some may affect the action of this medication.

## 2017-03-19 NOTE — DISCHARGE NOTE ADULT - CARE PLAN
Principal Discharge DX:	Acute CVA (cerebrovascular accident)  Goal:	prevention of new stroke  Instructions for follow-up, activity and diet:	You had two small strokes. It is unclear what caused the strokes -- could be from very high blood pressure or from clots forming elsewhere, such as the heart. Take your newly prescribed aspirin and atorvastatin daily and the new anti-hypertensive medications to control your blood pressure. You have to follow up with cardiologist, Dr. Alvarez (phone number below) within a week to follow up blood pressure, as well as, to set up longer period cardiac monitoring to make sure you have no cardiac arrhythmia. Check your blood pressure daily and make a log. If you notice that it is running low or high, call your doctor for medication changes. You also need to follow up with the neurologist, Dr. Lucero (phone number below) for the strokes. Within a few weeks he will likely go down on the aspirin dose from 325 to 81mg daily. Also, make an appointment to see a hematologist to evaluate you for a hypercoagulable state since you had two strokes. Call [phone] to make an appointment with a hematologist from that group, or you may choose another hematologist of your choice, but you should see a hematologist.   Follow up with you PMD for diabetic control and to help with weight loss.     Have your follow-ups within a week.  Secondary Diagnosis:	Hypertensive emergency  Instructions for follow-up, activity and diet:	Take your newly prescribed anti-hypertensive medication labetalol and also a smaller dose of your enalapril (10mg daily) as prescribed, to control your blood pressure. You have to follow up with cardiologist, Dr. Alvarez (phone number below) within a week to follow up on your blood pressure. Check your blood pressure daily and make a log. If you notice that it is running low or high, call your doctor for medication changes.  Secondary Diagnosis:	Acute diastolic heart failure  Instructions for follow-up, activity and diet:	You had some fluid in your lungs on admission and had mild exacerbation of diastolic heart failure likely from your very, very high blood pressure. Follow up with cardiologist Dr. Alvarez (phone number below) within a week. Monitor your weights, if it is rising by >2-3lbs per day, call your doctor. Eat a low salt diet.  Secondary Diagnosis:	Acute encephalopathy  Secondary Diagnosis:	Elevated troponin  Instructions for follow-up, activity and diet:	You had leakage of some heart enzymes from strain on your heart from your very, very high blood pressure on admission. Follow up with cardiologist Dr. Alvarez (phone number below) within a week. He may choose to do further ischemic evaluation to see if evidence of the vessels supplying your heart have some partial blockages. Take the prescribed aspirin and atorvastatin.  Secondary Diagnosis:	Diabetes mellitus  Instructions for follow-up, activity and diet:	Your Hb A1c is 7.2% which is a little above goal. Eat a low carb diet. Take your metformin and follow up with your PMD within a week to improve your diabetic control.  Secondary Diagnosis:	Hypopituitarism  Instructions for follow-up, activity and diet:	Take your synthroid and follow up with your PMD. Principal Discharge DX:	Acute CVA (cerebrovascular accident)  Goal:	prevention of new stroke  Instructions for follow-up, activity and diet:	You had two small strokes. It is unclear what caused the strokes -- could be from very high blood pressure or from clots forming elsewhere, such as the heart. Take your newly prescribed aspirin and atorvastatin daily and the new anti-hypertensive medications to control your blood pressure. You have to follow up with cardiologist, Dr. Alvarez (phone number below) within a week to follow up blood pressure, as well as, to set up longer period cardiac monitoring to make sure you have no cardiac arrhythmia. Check your blood pressure daily and make a log. If you notice that it is running low or high, call your doctor for medication changes. You also need to follow up with the neurologist, Dr. Lucero (phone number below) for the strokes. Within a few weeks he will likely go down on the aspirin dose from 325 to 81mg daily. Also, make an appointment to see a hematologist to evaluate you for a hypercoagulable state since you had two strokes. Call [phone] to make an appointment with a hematologist from that group, or you may choose another hematologist of your choice, but you should see a hematologist.   Follow up with you PMD for diabetic control and to help with weight loss.     Have your follow-ups within a week.  Secondary Diagnosis:	Hypertensive emergency  Instructions for follow-up, activity and diet:	Take your newly prescribed anti-hypertensive medication labetalol and also a smaller dose of your enalapril (10mg daily) as prescribed, to control your blood pressure. You have to follow up with cardiologist, Dr. Alvarez (phone number below) within a week to follow up on your blood pressure. Check your blood pressure daily and make a log. If you notice that it is running low or high, call your doctor for medication changes.  Secondary Diagnosis:	Acute diastolic heart failure  Instructions for follow-up, activity and diet:	You had some fluid in your lungs on admission and had mild exacerbation of diastolic heart failure likely from your very, very high blood pressure. Follow up with cardiologist Dr. Alvarez (phone number below) within a week. Monitor your weights, if it is rising by >2-3lbs per day, call your doctor. Eat a low salt diet.  Secondary Diagnosis:	Acute encephalopathy  Secondary Diagnosis:	Elevated troponin  Instructions for follow-up, activity and diet:	You had leakage of some heart enzymes from strain on your heart from your very, very high blood pressure on admission. Follow up with cardiologist Dr. Alvarez (phone number below) within a week. He may choose to do further ischemic evaluation to see if evidence of the vessels supplying your heart have some partial blockages. Take the prescribed aspirin and atorvastatin.  Secondary Diagnosis:	Diabetes mellitus  Instructions for follow-up, activity and diet:	Your Hb A1c is 7.2% which is a little above goal. Eat a low carb diet. Take your metformin and follow up with your PMD within a week to improve your diabetic control.  Secondary Diagnosis:	Hypopituitarism  Instructions for follow-up, activity and diet:	Take your synthroid and follow up with your PMD.

## 2017-03-19 NOTE — DISCHARGE NOTE ADULT - MEDICATION SUMMARY - MEDICATIONS TO TAKE
I will START or STAY ON the medications listed below when I get home from the hospital:    outpatient Physical Therapy  -- Outpatient Physical Therapy  Evaluate and Treat    Dx: Acute CVA     -- Indication: For stroke    traMADol 50 mg oral tablet  -- 1 tab(s) by mouth every 6 hours, As Needed -for severe pain MDD:8  -- Caution federal law prohibits the transfer of this drug to any person other  than the person for whom it was prescribed.  May cause drowsiness.  Alcohol may intensify this effect.  Use care when operating dangerous machinery.  Obtain medical advice before taking any non-prescription drugs as some may affect the action of this medication.    -- Indication: For For severe pain as needed only    Tylenol Arthritis Caplet 650 mg oral tablet, extended release  -- 2 tab(s) by mouth every 8 hours  -- Indication: For For pain as needed    aspirin 325 mg oral delayed release tablet  -- 1 tab(s) by mouth once a day  -- Indication: For stroke    enalapril 10 mg oral tablet  -- 1 tab(s) by mouth once a day  -- Indication: For Hypertension    metFORMIN 500 mg oral tablet  -- 500 milligram(s) by mouth 2 times a day  -- 1000 mg bid  -- Indication: For Diabetes mellitus    atorvastatin 40 mg oral tablet  -- 1 tab(s) by mouth once a day (at bedtime)  -- Indication: For stroke    labetalol 300 mg oral tablet  -- 1 tab(s) by mouth 2 times a day  -- Indication: For Hypertension    Flonase 50 mcg/inh nasal spray  -- 1 spray(s) into nose once a day  -- Indication: For Nasal congestion    lansoprazole 30 mg oral delayed release capsule  -- 1 cap(s) by mouth once a day  -- Indication: For acid reflux    levothyroxine 50 mcg (0.05 mg) oral tablet  -- 1 tab(s) by mouth once a day  -- Indication: For Hypopituitarism

## 2017-03-19 NOTE — DISCHARGE NOTE ADULT - CARE PROVIDER_API CALL
Adrianna Dubose), Internal Medicine  [address] [place] [address]  Phone: [phone]  Fax: [phone]    Ajit Alvarez), Internal Medicine  [address] [place] [address]  Phone: [phone]  Fax: [phone]    Ata Lucero (SHRUTHI), Neurology  [address] [place] [address]  Phone: [phone]  Fax: [phone]

## 2017-03-19 NOTE — DISCHARGE NOTE ADULT - CARE PROVIDERS DIRECT ADDRESSES
[email or web address],DirectAddress_Unknown,DirectAddress_Unknown

## 2017-03-19 NOTE — DISCHARGE NOTE ADULT - SECONDARY DIAGNOSIS.
Hypertensive emergency Acute diastolic heart failure Acute encephalopathy Elevated troponin Diabetes mellitus Hypopituitarism

## 2017-03-19 NOTE — DISCHARGE NOTE ADULT - NS AS DC STROKE ED MATERIALS
Stroke Warning Signs and Symptoms/High Blood pressure is a risk factor for Strokes/Call 911 for Stroke/Stroke Education Booklet/Prescribed Medications/Need for Followup After Discharge/Risk Factors for Stroke Stroke Education Booklet/Call 911 for Stroke/Risk Factors for Stroke/Stroke Warning Signs and Symptoms/Need for Followup After Discharge/Prescribed Medications

## 2017-03-19 NOTE — DISCHARGE NOTE ADULT - HOSPITAL COURSE
HPI:  62 yo F with a PMHx of: Morbidly obese with DM2, HTN, GERD, Fibromyalgia, OA, Chronic rhinosinusitis and hyponatremia presents to the ED today with her brother for lethargy and weakness.  The pt was very lethargic when examined so unable to obtain complete full HPI/ ROS from the patient- her brother was at bedside and provided limited  history.  The patients brother  stated that she has a mild HA, feels generalized weakness, and very sleepy since yesterday.  The pts brother stated that the pt had been to the [facility] ED on  3/16/17 for HTN and was discharged the same day with a prescription for Toradol and needed to followup with her PMD.  The pt stated that she doesn't have any Fever, Chills, Blurry vision, CP, SOB, abdominal pain, N/V/D/C, LE edema or any other issues. But history unreliable since pt was very lethargic.     Hospital Course:  ED Vitals were: 97.5 F, 90 HR, 180/110 BP, 15 RA, 97% SpO2 RA; UA: neg; CXR: Early CHF or fluid overload. No focal infiltrate  In ED, pt tx for HTN emergency with SBP rising to the 230s. Started on nicardipine drip and admitted to the ICU. CT head non-con showed no acute territorial infarct, hemorrhage, mass or mass effect.  Cardiology ([facility] group) evaluated the patient and transitioned from nicardipine drip to labetalol/hydralazine combination for BP control. Neurology evaluated pt, as well, and ordered MRI Brain. Pt had some mild ataxia and some mild L-sided ophthalmic deficits, but the ophthalmic deficits pt reported were present for years 2/2 a pituitary mass she had. Pt's symptoms of headache, dizziness, nausea resolved. BP stabilized and regimen changed to labetalol/enalapril. MRI Brain revealed 2 small acute lacunar infarcts in the right basal ganglia and left occipital lobe. Pt's TTE showed mild concentric hypertrophy and diastolic dysfunction, normal LV systolic function, no vegetations or thrombus. Pt was on a telemetry monitor for 5 days during the hospitalization and remained in sinus rhythm throughout with no episodes of afib or aflutter. It was unclear whether strokes were 2/2 hypertensive emergency or other etiology (?hypercoagulable state, ?embolic). Cardiology will f/up with pt within a week and likely place a loop recorder to look for PAF. Pt also had NSTEMI (Type II, demand ischemia), as well as, mild pulm edema / ADHF (diastolic) from hypertensive emergency, both of which had few symptoms and resolved quickly. EKG no ST changes. Pt will f/up with cardiology as outpatient for ischemic w/up to see if has CAD that was suggested during point of stress with hypertensive emergency. Pt had carotid Doppler which had mild disease and no flow-limiting lesions. Pt started on  (for at least 2 weeks as per neuro) and high-intensity statin. Pt will make appointment and follow up with her PMD, cardiology, and neuro (Dr. Lucero) within a week. Pt will also follow up with a hematologist to eval for hypercoagulable state. Evaluated by physical therapy who recommend outpatient physical therapy. VS stable. Pt feels well. Pt discharged to home.    On day of discharge:   ROS: denies headache, vision changes, weakness, fever, chills, n/v/d, CP, SOB, palpitations.  Phys Exam:   VS: 118/69; HR: 69; T: 97.2; RR: 18; 98% on RA  Gen: NAD  HEENT: mmm, EOMI  CV: rrr, S1, S2  Chest: CTA b/l  Abd: BS+, soft, NT, ND  Extr: trace LE edema  Neuro: AA&Ox3, strength 5/5 in all extremities, sensation to light touch intact, CN intact    Time spent on discharge: 45min    PMD Dr. Dubose's office called and message left regarding discharge with callback information.

## 2017-03-19 NOTE — DISCHARGE NOTE ADULT - PATIENT PORTAL LINK FT
“You can access the Follow[facility] Patient Portal, offered by [facility], by registering with the following website: [email or web address]”

## 2017-03-19 NOTE — DISCHARGE NOTE ADULT - PLAN OF CARE
prevention of new stroke You had two small strokes. It is unclear what caused the strokes -- could be from very high blood pressure or from clots forming elsewhere, such as the heart. Take your newly prescribed aspirin and atorvastatin daily and the new anti-hypertensive medications to control your blood pressure. You have to follow up with cardiologist, Dr. Alvarez (phone number below) within a week to follow up blood pressure, as well as, to set up longer period cardiac monitoring to make sure you have no cardiac arrhythmia. Check your blood pressure daily and make a log. If you notice that it is running low or high, call your doctor for medication changes. You also need to follow up with the neurologist, Dr. Lucero (phone number below) for the strokes. Within a few weeks he will likely go down on the aspirin dose from 325 to 81mg daily. Also, make an appointment to see a hematologist to evaluate you for a hypercoagulable state since you had two strokes. Call [phone] to make an appointment with a hematologist from that group, or you may choose another hematologist of your choice, but you should see a hematologist.   Follow up with you PMD for diabetic control and to help with weight loss.     Have your follow-ups within a week. Take your newly prescribed anti-hypertensive medication labetalol and also a smaller dose of your enalapril (10mg daily) as prescribed, to control your blood pressure. You have to follow up with cardiologist, Dr. Alvarez (phone number below) within a week to follow up on your blood pressure. Check your blood pressure daily and make a log. If you notice that it is running low or high, call your doctor for medication changes. You had some fluid in your lungs on admission and had mild exacerbation of diastolic heart failure likely from your very, very high blood pressure. Follow up with cardiologist Dr. Alvarez (phone number below) within a week. Monitor your weights, if it is rising by >2-3lbs per day, call your doctor. Eat a low salt diet. You had leakage of some heart enzymes from strain on your heart from your very, very high blood pressure on admission. Follow up with cardiologist Dr. Alvarez (phone number below) within a week. He may choose to do further ischemic evaluation to see if evidence of the vessels supplying your heart have some partial blockages. Take the prescribed aspirin and atorvastatin. Your Hb A1c is 7.2% which is a little above goal. Eat a low carb diet. Take your metformin and follow up with your PMD within a week to improve your diabetic control. Take your synthroid and follow up with your PMD.

## 2017-03-20 LAB
ANION GAP SERPL CALC-SCNC: 11 MMOL/L — SIGNIFICANT CHANGE UP (ref 5–17)
BUN SERPL-MCNC: 13 MG/DL — SIGNIFICANT CHANGE UP (ref 7–23)
CALCIUM SERPL-MCNC: 8.7 MG/DL — SIGNIFICANT CHANGE UP (ref 8.5–10.1)
CHLORIDE SERPL-SCNC: 99 MMOL/L — SIGNIFICANT CHANGE UP (ref 96–108)
CO2 SERPL-SCNC: 26 MMOL/L — SIGNIFICANT CHANGE UP (ref 22–31)
CREAT SERPL-MCNC: 0.82 MG/DL — SIGNIFICANT CHANGE UP (ref 0.5–1.3)
GLUCOSE SERPL-MCNC: 149 MG/DL — HIGH (ref 70–99)
HCT VFR BLD CALC: 36.8 % — SIGNIFICANT CHANGE UP (ref 34.5–45)
HGB BLD-MCNC: 12.7 G/DL — SIGNIFICANT CHANGE UP (ref 11.5–15.5)
MCHC RBC-ENTMCNC: 29.6 PG — SIGNIFICANT CHANGE UP (ref 27–34)
MCHC RBC-ENTMCNC: 34.5 GM/DL — SIGNIFICANT CHANGE UP (ref 32–36)
MCV RBC AUTO: 85.8 FL — SIGNIFICANT CHANGE UP (ref 80–100)
PLATELET # BLD AUTO: 283 K/UL — SIGNIFICANT CHANGE UP (ref 150–400)
POTASSIUM SERPL-MCNC: 3.9 MMOL/L — SIGNIFICANT CHANGE UP (ref 3.5–5.3)
POTASSIUM SERPL-SCNC: 3.9 MMOL/L — SIGNIFICANT CHANGE UP (ref 3.5–5.3)
RBC # BLD: 4.28 M/UL — SIGNIFICANT CHANGE UP (ref 3.8–5.2)
RBC # FLD: 12.2 % — SIGNIFICANT CHANGE UP (ref 10.3–14.5)
SODIUM SERPL-SCNC: 136 MMOL/L — SIGNIFICANT CHANGE UP (ref 135–145)
WBC # BLD: 7.9 K/UL — SIGNIFICANT CHANGE UP (ref 3.8–10.5)
WBC # FLD AUTO: 7.9 K/UL — SIGNIFICANT CHANGE UP (ref 3.8–10.5)

## 2017-03-20 PROCEDURE — 99233 SBSQ HOSP IP/OBS HIGH 50: CPT

## 2017-03-20 PROCEDURE — 70551 MRI BRAIN STEM W/O DYE: CPT | Mod: 26

## 2017-03-20 RX ORDER — LOPERAMIDE HCL 2 MG
2 TABLET ORAL DAILY
Qty: 0 | Refills: 0 | Status: DISCONTINUED | OUTPATIENT
Start: 2017-03-20 | End: 2017-03-21

## 2017-03-20 RX ORDER — HYDRALAZINE HCL 50 MG
25 TABLET ORAL EVERY 12 HOURS
Qty: 0 | Refills: 0 | Status: DISCONTINUED | OUTPATIENT
Start: 2017-03-21 | End: 2017-03-21

## 2017-03-20 RX ADMIN — Medication 25 MILLIGRAM(S): at 06:22

## 2017-03-20 RX ADMIN — PANTOPRAZOLE SODIUM 40 MILLIGRAM(S): 20 TABLET, DELAYED RELEASE ORAL at 06:22

## 2017-03-20 RX ADMIN — Medication 50 MICROGRAM(S): at 06:22

## 2017-03-20 RX ADMIN — Medication 1 SPRAY(S): at 17:38

## 2017-03-20 RX ADMIN — Medication 300 MILLIGRAM(S): at 06:22

## 2017-03-20 RX ADMIN — Medication 25 MILLIGRAM(S): at 17:30

## 2017-03-20 RX ADMIN — Medication 1: at 08:37

## 2017-03-20 RX ADMIN — Medication 25 MILLIGRAM(S): at 00:01

## 2017-03-20 RX ADMIN — Medication 1: at 17:22

## 2017-03-20 RX ADMIN — Medication 2: at 12:40

## 2017-03-20 RX ADMIN — ENOXAPARIN SODIUM 40 MILLIGRAM(S): 100 INJECTION SUBCUTANEOUS at 06:22

## 2017-03-20 RX ADMIN — ENOXAPARIN SODIUM 40 MILLIGRAM(S): 100 INJECTION SUBCUTANEOUS at 17:23

## 2017-03-20 RX ADMIN — Medication 1 SPRAY(S): at 06:22

## 2017-03-20 RX ADMIN — Medication 0.25 MILLIGRAM(S): at 11:09

## 2017-03-20 RX ADMIN — Medication 81 MILLIGRAM(S): at 12:40

## 2017-03-20 RX ADMIN — Medication 25 MILLIGRAM(S): at 13:00

## 2017-03-20 RX ADMIN — Medication 300 MILLIGRAM(S): at 17:29

## 2017-03-20 RX ADMIN — ATORVASTATIN CALCIUM 40 MILLIGRAM(S): 80 TABLET, FILM COATED ORAL at 21:44

## 2017-03-20 NOTE — PHYSICAL THERAPY INITIAL EVALUATION ADULT - PERTINENT HX OF CURRENT PROBLEM, REHAB EVAL
64 yo F with a PMHx of: Morbidly obese with DM2, HTN, GERD, Fibromyalgia, OA, Chronic rhinosinusitis and hyponatremia presents to the ED today with her brother for lethargy and weakness.

## 2017-03-21 VITALS — DIASTOLIC BLOOD PRESSURE: 82 MMHG | HEART RATE: 69 BPM | SYSTOLIC BLOOD PRESSURE: 153 MMHG

## 2017-03-21 PROCEDURE — 85027 COMPLETE CBC AUTOMATED: CPT

## 2017-03-21 PROCEDURE — 97116 GAIT TRAINING THERAPY: CPT

## 2017-03-21 PROCEDURE — 83036 HEMOGLOBIN GLYCOSYLATED A1C: CPT

## 2017-03-21 PROCEDURE — 82553 CREATINE MB FRACTION: CPT

## 2017-03-21 PROCEDURE — 93005 ELECTROCARDIOGRAM TRACING: CPT

## 2017-03-21 PROCEDURE — 93880 EXTRACRANIAL BILAT STUDY: CPT | Mod: 26

## 2017-03-21 PROCEDURE — 83935 ASSAY OF URINE OSMOLALITY: CPT

## 2017-03-21 PROCEDURE — 80061 LIPID PANEL: CPT

## 2017-03-21 PROCEDURE — 87086 URINE CULTURE/COLONY COUNT: CPT

## 2017-03-21 PROCEDURE — 81001 URINALYSIS AUTO W/SCOPE: CPT

## 2017-03-21 PROCEDURE — 93880 EXTRACRANIAL BILAT STUDY: CPT

## 2017-03-21 PROCEDURE — 84300 ASSAY OF URINE SODIUM: CPT

## 2017-03-21 PROCEDURE — 99233 SBSQ HOSP IP/OBS HIGH 50: CPT

## 2017-03-21 PROCEDURE — 85730 THROMBOPLASTIN TIME PARTIAL: CPT

## 2017-03-21 PROCEDURE — 85610 PROTHROMBIN TIME: CPT

## 2017-03-21 PROCEDURE — 96374 THER/PROPH/DIAG INJ IV PUSH: CPT

## 2017-03-21 PROCEDURE — 84484 ASSAY OF TROPONIN QUANT: CPT

## 2017-03-21 PROCEDURE — 94640 AIRWAY INHALATION TREATMENT: CPT

## 2017-03-21 PROCEDURE — 99239 HOSP IP/OBS DSCHRG MGMT >30: CPT

## 2017-03-21 PROCEDURE — 71045 X-RAY EXAM CHEST 1 VIEW: CPT

## 2017-03-21 PROCEDURE — 82570 ASSAY OF URINE CREATININE: CPT

## 2017-03-21 PROCEDURE — 82550 ASSAY OF CK (CPK): CPT

## 2017-03-21 PROCEDURE — 99284 EMERGENCY DEPT VISIT MOD MDM: CPT | Mod: 25

## 2017-03-21 PROCEDURE — 99285 EMERGENCY DEPT VISIT HI MDM: CPT | Mod: 25

## 2017-03-21 PROCEDURE — 70551 MRI BRAIN STEM W/O DYE: CPT

## 2017-03-21 PROCEDURE — 80053 COMPREHEN METABOLIC PANEL: CPT

## 2017-03-21 PROCEDURE — 93306 TTE W/DOPPLER COMPLETE: CPT

## 2017-03-21 PROCEDURE — 70450 CT HEAD/BRAIN W/O DYE: CPT

## 2017-03-21 PROCEDURE — 80048 BASIC METABOLIC PNL TOTAL CA: CPT

## 2017-03-21 PROCEDURE — 83735 ASSAY OF MAGNESIUM: CPT

## 2017-03-21 PROCEDURE — 84100 ASSAY OF PHOSPHORUS: CPT

## 2017-03-21 RX ORDER — ASPIRIN/CALCIUM CARB/MAGNESIUM 324 MG
1 TABLET ORAL
Qty: 21 | Refills: 0
Start: 2017-03-21 | End: 2017-04-11

## 2017-03-21 RX ORDER — ASPIRIN/CALCIUM CARB/MAGNESIUM 324 MG
325 TABLET ORAL DAILY
Qty: 0 | Refills: 0 | Status: DISCONTINUED | OUTPATIENT
Start: 2017-03-21 | End: 2017-03-21

## 2017-03-21 RX ORDER — ASPIRIN/CALCIUM CARB/MAGNESIUM 324 MG
1 TABLET ORAL
Qty: 0 | Refills: 0 | COMMUNITY
Start: 2017-03-21

## 2017-03-21 RX ORDER — LABETALOL HCL 100 MG
1 TABLET ORAL
Qty: 120 | Refills: 0
Start: 2017-03-21 | End: 2017-05-20

## 2017-03-21 RX ORDER — ATORVASTATIN CALCIUM 80 MG/1
1 TABLET, FILM COATED ORAL
Qty: 30 | Refills: 0
Start: 2017-03-21 | End: 2017-04-20

## 2017-03-21 RX ORDER — TRAMADOL HYDROCHLORIDE 50 MG/1
1 TABLET ORAL
Qty: 12 | Refills: 0
Start: 2017-03-21 | End: 2017-03-24

## 2017-03-21 RX ORDER — LABETALOL HCL 100 MG
1 TABLET ORAL
Qty: 0 | Refills: 0 | COMMUNITY
Start: 2017-03-21

## 2017-03-21 RX ORDER — ATORVASTATIN CALCIUM 80 MG/1
1 TABLET, FILM COATED ORAL
Qty: 0 | Refills: 0 | COMMUNITY
Start: 2017-03-21

## 2017-03-21 RX ADMIN — PANTOPRAZOLE SODIUM 40 MILLIGRAM(S): 20 TABLET, DELAYED RELEASE ORAL at 06:41

## 2017-03-21 RX ADMIN — Medication 1: at 08:21

## 2017-03-21 RX ADMIN — Medication 81 MILLIGRAM(S): at 12:11

## 2017-03-21 RX ADMIN — Medication 2: at 12:12

## 2017-03-21 RX ADMIN — Medication 50 MICROGRAM(S): at 05:51

## 2017-03-21 RX ADMIN — Medication 25 MILLIGRAM(S): at 05:52

## 2017-03-21 RX ADMIN — Medication 10 MILLIGRAM(S): at 17:20

## 2017-03-21 RX ADMIN — Medication 300 MILLIGRAM(S): at 05:51

## 2017-03-21 RX ADMIN — ENOXAPARIN SODIUM 40 MILLIGRAM(S): 100 INJECTION SUBCUTANEOUS at 05:53

## 2017-03-21 RX ADMIN — Medication 300 MILLIGRAM(S): at 17:20

## 2017-03-21 RX ADMIN — Medication 1 SPRAY(S): at 05:53

## 2017-03-23 ENCOUNTER — APPOINTMENT (OUTPATIENT)
Dept: INTERNAL MEDICINE | Facility: CLINIC | Age: 67
End: 2017-03-23

## 2017-03-23 VITALS
BODY MASS INDEX: 46.93 KG/M2 | SYSTOLIC BLOOD PRESSURE: 110 MMHG | HEIGHT: 62 IN | WEIGHT: 255 LBS | OXYGEN SATURATION: 98 % | RESPIRATION RATE: 14 BRPM | HEART RATE: 79 BPM | DIASTOLIC BLOOD PRESSURE: 80 MMHG

## 2017-03-23 VITALS — SYSTOLIC BLOOD PRESSURE: 118 MMHG | DIASTOLIC BLOOD PRESSURE: 82 MMHG

## 2017-03-23 DIAGNOSIS — Z91.09 OTHER ALLERGY STATUS, OTHER THAN TO DRUGS AND BIOLOGICAL SUBSTANCES: ICD-10-CM

## 2017-03-23 DIAGNOSIS — E66.01 MORBID (SEVERE) OBESITY DUE TO EXCESS CALORIES: ICD-10-CM

## 2017-03-23 DIAGNOSIS — J32.9 CHRONIC SINUSITIS, UNSPECIFIED: ICD-10-CM

## 2017-03-23 DIAGNOSIS — I21.4 NON-ST ELEVATION (NSTEMI) MYOCARDIAL INFARCTION: ICD-10-CM

## 2017-03-23 DIAGNOSIS — K21.9 GASTRO-ESOPHAGEAL REFLUX DISEASE WITHOUT ESOPHAGITIS: ICD-10-CM

## 2017-03-23 DIAGNOSIS — E11.9 TYPE 2 DIABETES MELLITUS WITHOUT COMPLICATIONS: ICD-10-CM

## 2017-03-23 DIAGNOSIS — I50.31 ACUTE DIASTOLIC (CONGESTIVE) HEART FAILURE: ICD-10-CM

## 2017-03-23 DIAGNOSIS — L28.2 OTHER PRURIGO: ICD-10-CM

## 2017-03-23 DIAGNOSIS — I11.0 HYPERTENSIVE HEART DISEASE WITH HEART FAILURE: ICD-10-CM

## 2017-03-23 DIAGNOSIS — M19.90 UNSPECIFIED OSTEOARTHRITIS, UNSPECIFIED SITE: ICD-10-CM

## 2017-03-23 DIAGNOSIS — M79.7 FIBROMYALGIA: ICD-10-CM

## 2017-03-23 DIAGNOSIS — G93.40 ENCEPHALOPATHY, UNSPECIFIED: ICD-10-CM

## 2017-03-23 DIAGNOSIS — Z88.0 ALLERGY STATUS TO PENICILLIN: ICD-10-CM

## 2017-03-23 DIAGNOSIS — E87.1 HYPO-OSMOLALITY AND HYPONATREMIA: ICD-10-CM

## 2017-03-23 DIAGNOSIS — E23.0 HYPOPITUITARISM: ICD-10-CM

## 2017-03-23 DIAGNOSIS — I16.1 HYPERTENSIVE EMERGENCY: ICD-10-CM

## 2017-03-23 DIAGNOSIS — R51 HEADACHE: ICD-10-CM

## 2017-03-23 DIAGNOSIS — K21.9 GASTRO-ESOPHAGEAL REFLUX DISEASE W/OUT ESOPHAGITIS: ICD-10-CM

## 2017-03-23 RX ORDER — LANSOPRAZOLE 30 MG/1
30 CAPSULE, DELAYED RELEASE ORAL
Refills: 0 | Status: ACTIVE | COMMUNITY
Start: 2017-03-23

## 2017-03-28 ENCOUNTER — RX RENEWAL (OUTPATIENT)
Age: 67
End: 2017-03-28

## 2017-03-30 PROBLEM — R53.1 WEAKNESS: Status: ACTIVE | Noted: 2017-03-30

## 2017-03-31 ENCOUNTER — NON-APPOINTMENT (OUTPATIENT)
Age: 67
End: 2017-03-31

## 2017-03-31 ENCOUNTER — APPOINTMENT (OUTPATIENT)
Dept: CARDIOLOGY | Facility: CLINIC | Age: 67
End: 2017-03-31

## 2017-03-31 VITALS
DIASTOLIC BLOOD PRESSURE: 103 MMHG | HEART RATE: 75 BPM | SYSTOLIC BLOOD PRESSURE: 198 MMHG | OXYGEN SATURATION: 97 % | HEIGHT: 62 IN | BODY MASS INDEX: 44.72 KG/M2 | WEIGHT: 243 LBS

## 2017-03-31 VITALS — SYSTOLIC BLOOD PRESSURE: 148 MMHG | DIASTOLIC BLOOD PRESSURE: 88 MMHG

## 2017-03-31 DIAGNOSIS — R74.8 ABNORMAL LEVELS OF OTHER SERUM ENZYMES: ICD-10-CM

## 2017-03-31 DIAGNOSIS — R53.1 WEAKNESS: ICD-10-CM

## 2017-04-06 ENCOUNTER — MEDICATION RENEWAL (OUTPATIENT)
Age: 67
End: 2017-04-06

## 2017-04-07 RX ORDER — ATORVASTATIN CALCIUM 40 MG/1
40 TABLET, FILM COATED ORAL
Qty: 90 | Refills: 3 | Status: DISCONTINUED | COMMUNITY
Start: 2017-03-23 | End: 2017-04-07

## 2017-04-18 ENCOUNTER — RX RENEWAL (OUTPATIENT)
Age: 67
End: 2017-04-18

## 2017-04-21 ENCOUNTER — APPOINTMENT (OUTPATIENT)
Dept: INTERNAL MEDICINE | Facility: CLINIC | Age: 67
End: 2017-04-21

## 2017-04-21 VITALS
RESPIRATION RATE: 14 BRPM | DIASTOLIC BLOOD PRESSURE: 90 MMHG | TEMPERATURE: 98 F | BODY MASS INDEX: 44.9 KG/M2 | HEART RATE: 77 BPM | OXYGEN SATURATION: 99 % | WEIGHT: 244 LBS | SYSTOLIC BLOOD PRESSURE: 158 MMHG | HEIGHT: 62 IN

## 2017-04-21 VITALS — SYSTOLIC BLOOD PRESSURE: 140 MMHG | DIASTOLIC BLOOD PRESSURE: 90 MMHG

## 2017-05-01 ENCOUNTER — APPOINTMENT (OUTPATIENT)
Dept: CARDIOLOGY | Facility: CLINIC | Age: 67
End: 2017-05-01

## 2017-05-01 LAB
ALBUMIN SERPL ELPH-MCNC: 4.1 G/DL
ALP BLD-CCNC: 91 U/L
ALT SERPL-CCNC: 20 U/L
ANION GAP SERPL CALC-SCNC: 17 MMOL/L
AST SERPL-CCNC: 15 U/L
BASOPHILS # BLD AUTO: 0.05 K/UL
BASOPHILS NFR BLD AUTO: 0.6 %
BILIRUB SERPL-MCNC: 0.3 MG/DL
BUN SERPL-MCNC: 21 MG/DL
CALCIUM SERPL-MCNC: 9.8 MG/DL
CHLORIDE SERPL-SCNC: 97 MMOL/L
CO2 SERPL-SCNC: 24 MMOL/L
CREAT SERPL-MCNC: 1.07 MG/DL
CREAT SPEC-SCNC: 77 MG/DL
EOSINOPHIL # BLD AUTO: 0.22 K/UL
EOSINOPHIL NFR BLD AUTO: 2.5 %
GLUCOSE SERPL-MCNC: 142 MG/DL
HCT VFR BLD CALC: 36.6 %
HGB BLD-MCNC: 11.8 G/DL
IMM GRANULOCYTES NFR BLD AUTO: 0.5 %
LYMPHOCYTES # BLD AUTO: 2.44 K/UL
LYMPHOCYTES NFR BLD AUTO: 27.5 %
MAN DIFF?: NORMAL
MCHC RBC-ENTMCNC: 29.1 PG
MCHC RBC-ENTMCNC: 32.2 GM/DL
MCV RBC AUTO: 90.1 FL
MICROALBUMIN 24H UR DL<=1MG/L-MCNC: <0.3 MG/DL
MICROALBUMIN/CREAT 24H UR-RTO: NORMAL UG/MG
MONOCYTES # BLD AUTO: 0.71 K/UL
MONOCYTES NFR BLD AUTO: 8 %
NEUTROPHILS # BLD AUTO: 5.42 K/UL
NEUTROPHILS NFR BLD AUTO: 60.9 %
PLATELET # BLD AUTO: 296 K/UL
POTASSIUM SERPL-SCNC: 4.4 MMOL/L
PROT SERPL-MCNC: 6.9 G/DL
RBC # BLD: 4.06 M/UL
RBC # FLD: 13.7 %
SODIUM SERPL-SCNC: 138 MMOL/L
WBC # FLD AUTO: 8.88 K/UL

## 2017-05-04 LAB
25(OH)D3 SERPL-MCNC: 43.1 NG/ML
HBA1C MFR BLD HPLC: 7.2 %
T4 FREE SERPL-MCNC: 1.5 NG/DL
TSH SERPL-ACNC: 1.3 UIU/ML

## 2017-05-05 ENCOUNTER — APPOINTMENT (OUTPATIENT)
Dept: CARDIOLOGY | Facility: CLINIC | Age: 67
End: 2017-05-05

## 2017-05-05 VITALS
BODY MASS INDEX: 46.93 KG/M2 | HEART RATE: 83 BPM | OXYGEN SATURATION: 98 % | HEIGHT: 62 IN | WEIGHT: 255 LBS | SYSTOLIC BLOOD PRESSURE: 183 MMHG | DIASTOLIC BLOOD PRESSURE: 87 MMHG

## 2017-05-08 ENCOUNTER — APPOINTMENT (OUTPATIENT)
Dept: ENDOCRINOLOGY | Facility: CLINIC | Age: 67
End: 2017-05-08

## 2017-05-08 VITALS — SYSTOLIC BLOOD PRESSURE: 124 MMHG | OXYGEN SATURATION: 97 % | HEART RATE: 88 BPM | DIASTOLIC BLOOD PRESSURE: 78 MMHG

## 2017-05-12 ENCOUNTER — APPOINTMENT (OUTPATIENT)
Dept: INTERNAL MEDICINE | Facility: CLINIC | Age: 67
End: 2017-05-12

## 2017-05-12 VITALS
HEART RATE: 76 BPM | HEIGHT: 62 IN | OXYGEN SATURATION: 97 % | TEMPERATURE: 98.4 F | BODY MASS INDEX: 45.82 KG/M2 | RESPIRATION RATE: 14 BRPM | SYSTOLIC BLOOD PRESSURE: 140 MMHG | DIASTOLIC BLOOD PRESSURE: 88 MMHG | WEIGHT: 249 LBS

## 2017-05-17 ENCOUNTER — NON-APPOINTMENT (OUTPATIENT)
Age: 67
End: 2017-05-17

## 2017-05-17 ENCOUNTER — APPOINTMENT (OUTPATIENT)
Dept: CARDIOLOGY | Facility: CLINIC | Age: 67
End: 2017-05-17

## 2017-05-17 VITALS
OXYGEN SATURATION: 94 % | SYSTOLIC BLOOD PRESSURE: 172 MMHG | DIASTOLIC BLOOD PRESSURE: 87 MMHG | BODY MASS INDEX: 45.45 KG/M2 | WEIGHT: 247 LBS | HEART RATE: 83 BPM | HEIGHT: 62 IN

## 2017-05-19 ENCOUNTER — APPOINTMENT (OUTPATIENT)
Dept: CARDIOLOGY | Facility: CLINIC | Age: 67
End: 2017-05-19

## 2017-05-26 ENCOUNTER — APPOINTMENT (OUTPATIENT)
Dept: INTERNAL MEDICINE | Facility: CLINIC | Age: 67
End: 2017-05-26

## 2017-05-26 VITALS
DIASTOLIC BLOOD PRESSURE: 80 MMHG | OXYGEN SATURATION: 98 % | BODY MASS INDEX: 45.08 KG/M2 | WEIGHT: 245 LBS | SYSTOLIC BLOOD PRESSURE: 120 MMHG | TEMPERATURE: 98.1 F | HEIGHT: 62 IN | HEART RATE: 61 BPM | RESPIRATION RATE: 14 BRPM

## 2017-06-02 ENCOUNTER — APPOINTMENT (OUTPATIENT)
Dept: CARDIOLOGY | Facility: CLINIC | Age: 67
End: 2017-06-02

## 2017-06-02 ENCOUNTER — NON-APPOINTMENT (OUTPATIENT)
Age: 67
End: 2017-06-02

## 2017-06-02 VITALS
SYSTOLIC BLOOD PRESSURE: 175 MMHG | DIASTOLIC BLOOD PRESSURE: 102 MMHG | OXYGEN SATURATION: 99 % | HEART RATE: 81 BPM | HEIGHT: 62 IN

## 2017-06-09 ENCOUNTER — MEDICATION RENEWAL (OUTPATIENT)
Age: 67
End: 2017-06-09

## 2017-06-14 ENCOUNTER — APPOINTMENT (OUTPATIENT)
Dept: INTERNAL MEDICINE | Facility: CLINIC | Age: 67
End: 2017-06-14

## 2017-06-14 VITALS
BODY MASS INDEX: 45.14 KG/M2 | RESPIRATION RATE: 14 BRPM | SYSTOLIC BLOOD PRESSURE: 140 MMHG | OXYGEN SATURATION: 98 % | HEART RATE: 80 BPM | WEIGHT: 245.31 LBS | HEIGHT: 62 IN | DIASTOLIC BLOOD PRESSURE: 90 MMHG | TEMPERATURE: 98 F

## 2017-06-14 VITALS — DIASTOLIC BLOOD PRESSURE: 88 MMHG | SYSTOLIC BLOOD PRESSURE: 158 MMHG

## 2017-06-14 DIAGNOSIS — I63.81 OTHER CEREBRAL INFARCTION DUE TO OCCLUSION OR STENOSIS OF SMALL ARTERY: ICD-10-CM

## 2017-06-15 LAB
ANION GAP SERPL CALC-SCNC: 18 MMOL/L
BUN SERPL-MCNC: 14 MG/DL
CALCIUM SERPL-MCNC: 9.9 MG/DL
CHLORIDE SERPL-SCNC: 96 MMOL/L
CO2 SERPL-SCNC: 22 MMOL/L
CREAT SERPL-MCNC: 0.87 MG/DL
GLUCOSE SERPL-MCNC: 177 MG/DL
POTASSIUM SERPL-SCNC: 4.7 MMOL/L
SODIUM SERPL-SCNC: 136 MMOL/L

## 2017-06-22 ENCOUNTER — APPOINTMENT (OUTPATIENT)
Dept: CARDIOLOGY | Facility: CLINIC | Age: 67
End: 2017-06-22

## 2017-07-03 ENCOUNTER — APPOINTMENT (OUTPATIENT)
Dept: CARDIOLOGY | Facility: CLINIC | Age: 67
End: 2017-07-03

## 2017-07-03 ENCOUNTER — NON-APPOINTMENT (OUTPATIENT)
Age: 67
End: 2017-07-03

## 2017-07-03 VITALS
BODY MASS INDEX: 45.08 KG/M2 | SYSTOLIC BLOOD PRESSURE: 168 MMHG | HEIGHT: 62 IN | WEIGHT: 245 LBS | HEART RATE: 76 BPM | DIASTOLIC BLOOD PRESSURE: 89 MMHG | OXYGEN SATURATION: 99 %

## 2017-07-03 VITALS — DIASTOLIC BLOOD PRESSURE: 78 MMHG | SYSTOLIC BLOOD PRESSURE: 142 MMHG

## 2017-07-06 ENCOUNTER — RX RENEWAL (OUTPATIENT)
Age: 67
End: 2017-07-06

## 2017-07-10 ENCOUNTER — APPOINTMENT (OUTPATIENT)
Dept: INTERNAL MEDICINE | Facility: CLINIC | Age: 67
End: 2017-07-10

## 2017-07-10 ENCOUNTER — MEDICATION RENEWAL (OUTPATIENT)
Age: 67
End: 2017-07-10

## 2017-07-10 VITALS
DIASTOLIC BLOOD PRESSURE: 88 MMHG | SYSTOLIC BLOOD PRESSURE: 162 MMHG | OXYGEN SATURATION: 99 % | TEMPERATURE: 97.8 F | BODY MASS INDEX: 44.53 KG/M2 | HEIGHT: 62 IN | HEART RATE: 76 BPM | RESPIRATION RATE: 14 BRPM | WEIGHT: 242 LBS

## 2017-07-11 LAB
APPEARANCE: CLEAR
BILIRUBIN URINE: NEGATIVE
BLOOD URINE: NEGATIVE
COLOR: YELLOW
GLUCOSE QUALITATIVE U: NORMAL MG/DL
KETONES URINE: NEGATIVE
LEUKOCYTE ESTERASE URINE: NEGATIVE
NITRITE URINE: NEGATIVE
PH URINE: 6
PROTEIN URINE: NEGATIVE MG/DL
SPECIFIC GRAVITY URINE: 1.01
UROBILINOGEN URINE: NORMAL MG/DL

## 2017-07-12 LAB — BACTERIA UR CULT: NORMAL

## 2017-07-24 ENCOUNTER — APPOINTMENT (OUTPATIENT)
Dept: INTERNAL MEDICINE | Facility: CLINIC | Age: 67
End: 2017-07-24

## 2017-07-24 VITALS
BODY MASS INDEX: 44.16 KG/M2 | HEART RATE: 76 BPM | DIASTOLIC BLOOD PRESSURE: 80 MMHG | RESPIRATION RATE: 14 BRPM | WEIGHT: 240 LBS | HEIGHT: 62 IN | TEMPERATURE: 98.2 F | SYSTOLIC BLOOD PRESSURE: 136 MMHG | OXYGEN SATURATION: 98 %

## 2017-07-24 RX ORDER — SPIRONOLACTONE 25 MG/1
25 TABLET ORAL DAILY
Qty: 30 | Refills: 5 | Status: DISCONTINUED | COMMUNITY
Start: 2017-06-02 | End: 2017-07-24

## 2017-08-28 ENCOUNTER — APPOINTMENT (OUTPATIENT)
Dept: CARDIOLOGY | Facility: CLINIC | Age: 67
End: 2017-08-28
Payer: COMMERCIAL

## 2017-08-28 ENCOUNTER — NON-APPOINTMENT (OUTPATIENT)
Age: 67
End: 2017-08-28

## 2017-08-28 VITALS
BODY MASS INDEX: 44.72 KG/M2 | HEIGHT: 62 IN | WEIGHT: 243 LBS | HEART RATE: 83 BPM | OXYGEN SATURATION: 96 % | DIASTOLIC BLOOD PRESSURE: 84 MMHG | SYSTOLIC BLOOD PRESSURE: 157 MMHG

## 2017-08-28 PROCEDURE — 99214 OFFICE O/P EST MOD 30 MIN: CPT

## 2017-08-28 PROCEDURE — 93000 ELECTROCARDIOGRAM COMPLETE: CPT

## 2017-08-29 RX ORDER — NEBIVOLOL HYDROCHLORIDE 10 MG/1
10 TABLET ORAL
Qty: 60 | Refills: 5 | Status: DISCONTINUED | COMMUNITY
Start: 2017-08-28 | End: 2017-08-29

## 2017-09-22 ENCOUNTER — RX RENEWAL (OUTPATIENT)
Age: 67
End: 2017-09-22

## 2017-09-23 ENCOUNTER — RX RENEWAL (OUTPATIENT)
Age: 67
End: 2017-09-23

## 2017-10-03 ENCOUNTER — RX RENEWAL (OUTPATIENT)
Age: 67
End: 2017-10-03

## 2017-10-05 ENCOUNTER — NON-APPOINTMENT (OUTPATIENT)
Age: 67
End: 2017-10-05

## 2017-10-05 ENCOUNTER — APPOINTMENT (OUTPATIENT)
Dept: CARDIOLOGY | Facility: CLINIC | Age: 67
End: 2017-10-05
Payer: COMMERCIAL

## 2017-10-05 VITALS
HEART RATE: 83 BPM | DIASTOLIC BLOOD PRESSURE: 66 MMHG | HEIGHT: 62 IN | SYSTOLIC BLOOD PRESSURE: 132 MMHG | OXYGEN SATURATION: 98 % | WEIGHT: 242 LBS | BODY MASS INDEX: 44.53 KG/M2

## 2017-10-05 DIAGNOSIS — R60.9 EDEMA, UNSPECIFIED: ICD-10-CM

## 2017-10-05 PROCEDURE — 93000 ELECTROCARDIOGRAM COMPLETE: CPT

## 2017-10-05 PROCEDURE — 99214 OFFICE O/P EST MOD 30 MIN: CPT

## 2017-10-05 RX ORDER — LABETALOL HYDROCHLORIDE 100 MG/1
100 TABLET, FILM COATED ORAL
Qty: 60 | Refills: 1 | Status: DISCONTINUED | COMMUNITY
Start: 2017-03-23 | End: 2017-10-05

## 2017-10-13 ENCOUNTER — APPOINTMENT (OUTPATIENT)
Dept: INTERNAL MEDICINE | Facility: CLINIC | Age: 67
End: 2017-10-13
Payer: COMMERCIAL

## 2017-10-13 VITALS
RESPIRATION RATE: 14 BRPM | SYSTOLIC BLOOD PRESSURE: 148 MMHG | DIASTOLIC BLOOD PRESSURE: 80 MMHG | OXYGEN SATURATION: 98 % | HEIGHT: 62 IN | BODY MASS INDEX: 44.35 KG/M2 | TEMPERATURE: 98 F | HEART RATE: 84 BPM | WEIGHT: 241 LBS

## 2017-10-13 VITALS — SYSTOLIC BLOOD PRESSURE: 130 MMHG | DIASTOLIC BLOOD PRESSURE: 80 MMHG

## 2017-10-13 PROCEDURE — G0008: CPT

## 2017-10-13 PROCEDURE — 90686 IIV4 VACC NO PRSV 0.5 ML IM: CPT

## 2017-10-13 PROCEDURE — 99213 OFFICE O/P EST LOW 20 MIN: CPT | Mod: 25

## 2017-10-17 NOTE — PROVIDER CONTACT NOTE (CRITICAL VALUE NOTIFICATION) - TEST AND RESULT REPORTED:
Chief Complaint   Patient presents with   • Follow-up     history of gout/ arthropathy        Didier Alexis is a 64 year old male who is here for follow-up of gout.  Patient is currently not maintained on any gout related medication. Over the past one week, joint pain and stiffness are no symptoms. and with pain intensity rating of 0 on a scale of 0 to 10. Patient continued to have swelling in her R big toe and foot.  However he does not have any pain. Patient's global assessment of disease activity is improved but with persistence of swelling. He is able to wear some of his dress shoes but not all. He continues to be very active and has not had any issues with pain. Patient denies chest pain, shortness of breath, cough, hair loss, mouth ulcers, skin nodules, or new-onset neck pain.         PMH:    Herpes simplex without mention of complication                  Comment: Herpes Genitalis    BCC (basal cell carcinoma), face                2005            Comment: BCC right tip nose, Mohs    Atrial fibrillation (CMS/HCC)                   11/06           Comment: during stress test / Vs SVT    Impotence of organic origin                                   Malignant neoplasm of skin of leg               2006            Comment: KA type SCC below right knee    AK (actinic keratosis)                                        Sleep apnea                                     10/13/11        Comment: mild, no treatment needed    Current Outpatient Prescriptions   Medication Sig Dispense Refill   • polyethylene glycol-electrolytes (NULYTELY) 420 g solution Take as directed on colonoscopy instruction letter 4000 mL 0   • tadalafil (CIALIS) 20 MG tablet Take 1 tablet by mouth as needed for Erectile Dysfunction. 6 tablet 11   • vardenafil (LEVITRA) 10 MG tablet Take 1 tablet by mouth as needed for Erectile Dysfunction. 6 tablet 5   • Cholecalciferol (VITAMIN D3) 1000 UNIT OR TABS 2 TABLETS DAILY  0         ALLERGIES:  No Known  Allergies    Social History   Substance Use Topics   • Smoking status: Current Some Day Smoker     Years: 21.00     Types: Cigars     Last attempt to quit: 3/28/2011   • Smokeless tobacco: Never Used      Comment: quit smoking cigarettes in the 90's, smoked cigars 12/25/08 - 4/2012, smoking pipes since 4/2012   • Alcohol use 1.2 oz/week     2 Standard drinks or equivalent per week       Family History   Problem Relation Age of Onset   • OTHER Other      No family health issues       Review of Systems  Six-systems review is negative other than symptoms stated in the HPI.    Questionnaires: PHQ8 depression= 2 (no suicidal ideation), MD-GUADALUPE= 0, RAPID3=0       Objective:     Vitals:    10/17/17 0832   BP: 118/80   Pulse: 86   Temp: 97.7 °F (36.5 °C)     Wt Readings from Last 3 Encounters:   10/17/17 93.9 kg   08/31/16 93.3 kg   03/16/16 92.5 kg     General appearance: alert, cooperative and no distress  Eyes: No conjunctival injection, PERRL  Throat: No mouth ulcers and wet buccal mucosa.  Neck: no adenopathy, no carotid bruit, no JVD, symmetric, no mass/nodules  Lungs: clear to auscultation bilaterally  Extremities: no edema, redness or tenderness in the calves or thighs  Skin: Skin color, texture, turgor normal. No rashes or lesions  Neurologic: Alert and oriented x 3, no gross motor and sensory deficits, normal gait  MSK: No peripheral synovitis noted. High arch foot with bunion deformity.      Lab Review: I reviewed the labs and radiology.  Lab Results   Component Value Date    WBC 5.4 08/07/2015    HGB 14.7 08/07/2015    HCT 44.3 08/07/2015    MCV 94.9 08/07/2015     08/07/2015     Lab Results   Component Value Date    CREATININE 0.90 08/07/2015     Component      Latest Ref Rng & Units 6/16/2010 12/4/2015 4/2/2016   URIC ACID      3.5 - 7.2 mg/dL 7.5 6.3 5.8       Assessment:     Didier Alexis is a 64 year old male with h/o gout presents for follow up. Patient's gout is not crystal proven.  All his prior  episodes involved his R big toe. His X-ray foot done on 12/2015 showed mild degenerative change at the first metatarsophalangeal joint, with erosive change at the medial aspect of the first metatarsal head, likely representing the patient's known gout. He does not have any h/o renal stone or CKD. During today's evaluation patient does not have any active synovitis. He does have bunion deformity and high arched foot. His foot and toe swelling at this time is likely mechanical in nature at this time and not related to gout.    Plan:       Meds: No medication indicated at this time.    Labs: Uric Acid    Referral: Service to podiatry    Follow Up: Prn    Patient education, discussing about health maintenance (i.e., participation in regular physical activity, monitoring cholesterol level, updating vaccinations, and checking bone density once every 2 years); medication side effects; and current management plans.     10/17/2017 9:10 AM                    trop = 0.744

## 2017-10-20 ENCOUNTER — RX RENEWAL (OUTPATIENT)
Age: 67
End: 2017-10-20

## 2017-12-11 ENCOUNTER — RX RENEWAL (OUTPATIENT)
Age: 67
End: 2017-12-11

## 2017-12-11 LAB
25(OH)D3 SERPL-MCNC: 46.6 NG/ML
ALBUMIN SERPL ELPH-MCNC: 4.2 G/DL
ALP BLD-CCNC: 104 U/L
ALT SERPL-CCNC: 19 U/L
ANION GAP SERPL CALC-SCNC: 17 MMOL/L
AST SERPL-CCNC: 16 U/L
BASOPHILS # BLD AUTO: 0.05 K/UL
BASOPHILS NFR BLD AUTO: 0.7 %
BILIRUB SERPL-MCNC: 0.4 MG/DL
BUN SERPL-MCNC: 21 MG/DL
CALCIUM SERPL-MCNC: 9.5 MG/DL
CHLORIDE SERPL-SCNC: 97 MMOL/L
CHOLEST SERPL-MCNC: 141 MG/DL
CHOLEST/HDLC SERPL: 2.4 RATIO
CO2 SERPL-SCNC: 25 MMOL/L
CREAT SERPL-MCNC: 1.03 MG/DL
EOSINOPHIL # BLD AUTO: 0.17 K/UL
EOSINOPHIL NFR BLD AUTO: 2.5
GLUCOSE SERPL-MCNC: 143 MG/DL
HBA1C MFR BLD HPLC: 7.7 %
HCT VFR BLD CALC: 40 %
HDLC SERPL-MCNC: 60 MG/DL
HGB BLD-MCNC: 12.5 G/DL
IMM GRANULOCYTES NFR BLD AUTO: 0.1 %
LDLC SERPL CALC-MCNC: 57 MG/DL
LYMPHOCYTES # BLD AUTO: 1.82 K/UL
LYMPHOCYTES NFR BLD AUTO: 27 %
MAN DIFF?: NORMAL
MCHC RBC-ENTMCNC: 28.5 PG
MCHC RBC-ENTMCNC: 31.3 GM/DL
MCV RBC AUTO: 91.3 FL
MONOCYTES # BLD AUTO: 0.57 K/UL
MONOCYTES NFR BLD AUTO: 8.5 %
NEUTROPHILS # BLD AUTO: 4.11 K/UL
NEUTROPHILS NFR BLD AUTO: 61.2 %
PLATELET # BLD AUTO: 304 K/UL
POTASSIUM SERPL-SCNC: 4.6 MMOL/L
PROT SERPL-MCNC: 6.9 G/DL
RBC # BLD: 4.38 M/UL
RBC # FLD: 13.8 %
SODIUM SERPL-SCNC: 139 MMOL/L
T4 FREE SERPL-MCNC: 1.5 NG/DL
TRIGL SERPL-MCNC: 120 MG/DL
TSH SERPL-ACNC: 1.08 UIU/ML
WBC # FLD AUTO: 6.73 K/UL

## 2018-01-08 ENCOUNTER — APPOINTMENT (OUTPATIENT)
Dept: CARDIOLOGY | Facility: CLINIC | Age: 68
End: 2018-01-08
Payer: COMMERCIAL

## 2018-01-08 ENCOUNTER — NON-APPOINTMENT (OUTPATIENT)
Age: 68
End: 2018-01-08

## 2018-01-08 VITALS
HEIGHT: 62 IN | SYSTOLIC BLOOD PRESSURE: 187 MMHG | WEIGHT: 240 LBS | DIASTOLIC BLOOD PRESSURE: 119 MMHG | BODY MASS INDEX: 44.16 KG/M2 | HEART RATE: 82 BPM | OXYGEN SATURATION: 99 %

## 2018-01-08 PROCEDURE — 99214 OFFICE O/P EST MOD 30 MIN: CPT

## 2018-01-08 PROCEDURE — 93000 ELECTROCARDIOGRAM COMPLETE: CPT

## 2018-01-12 ENCOUNTER — APPOINTMENT (OUTPATIENT)
Dept: INTERNAL MEDICINE | Facility: CLINIC | Age: 68
End: 2018-01-12
Payer: COMMERCIAL

## 2018-01-12 VITALS
HEIGHT: 62 IN | TEMPERATURE: 98 F | OXYGEN SATURATION: 98 % | HEART RATE: 71 BPM | WEIGHT: 240 LBS | BODY MASS INDEX: 44.16 KG/M2 | RESPIRATION RATE: 14 BRPM | DIASTOLIC BLOOD PRESSURE: 82 MMHG | SYSTOLIC BLOOD PRESSURE: 148 MMHG

## 2018-01-12 DIAGNOSIS — E23.0 HYPOPITUITARISM: ICD-10-CM

## 2018-01-12 PROCEDURE — 99214 OFFICE O/P EST MOD 30 MIN: CPT

## 2018-01-12 RX ORDER — CARVEDILOL 6.25 MG/1
6.25 TABLET, FILM COATED ORAL
Qty: 60 | Refills: 0 | Status: DISCONTINUED | COMMUNITY
Start: 2017-08-29 | End: 2018-01-12

## 2018-01-12 RX ORDER — HYDROCORTISONE 25 MG/G
2.5 CREAM TOPICAL TWICE DAILY
Qty: 1 | Refills: 1 | Status: DISCONTINUED | COMMUNITY
Start: 2017-03-23 | End: 2018-01-12

## 2018-01-16 ENCOUNTER — APPOINTMENT (OUTPATIENT)
Dept: CARDIOLOGY | Facility: CLINIC | Age: 68
End: 2018-01-16
Payer: COMMERCIAL

## 2018-01-16 VITALS
DIASTOLIC BLOOD PRESSURE: 94 MMHG | SYSTOLIC BLOOD PRESSURE: 174 MMHG | BODY MASS INDEX: 44.16 KG/M2 | WEIGHT: 240 LBS | HEIGHT: 62 IN | HEART RATE: 78 BPM | OXYGEN SATURATION: 100 %

## 2018-01-16 VITALS — DIASTOLIC BLOOD PRESSURE: 70 MMHG | SYSTOLIC BLOOD PRESSURE: 138 MMHG

## 2018-01-16 PROCEDURE — 99213 OFFICE O/P EST LOW 20 MIN: CPT

## 2018-01-20 ENCOUNTER — APPOINTMENT (OUTPATIENT)
Dept: INTERNAL MEDICINE | Facility: CLINIC | Age: 68
End: 2018-01-20
Payer: COMMERCIAL

## 2018-01-20 VITALS
HEART RATE: 77 BPM | DIASTOLIC BLOOD PRESSURE: 110 MMHG | HEIGHT: 62 IN | BODY MASS INDEX: 43.98 KG/M2 | TEMPERATURE: 98.6 F | SYSTOLIC BLOOD PRESSURE: 180 MMHG | WEIGHT: 239 LBS | RESPIRATION RATE: 14 BRPM | OXYGEN SATURATION: 99 %

## 2018-01-20 VITALS — SYSTOLIC BLOOD PRESSURE: 160 MMHG | DIASTOLIC BLOOD PRESSURE: 80 MMHG

## 2018-01-20 DIAGNOSIS — Z87.09 PERSONAL HISTORY OF OTHER DISEASES OF THE RESPIRATORY SYSTEM: ICD-10-CM

## 2018-01-20 LAB — S PYO AG SPEC QL IA: NORMAL

## 2018-01-20 PROCEDURE — 87880 STREP A ASSAY W/OPTIC: CPT | Mod: QW

## 2018-01-20 PROCEDURE — 99213 OFFICE O/P EST LOW 20 MIN: CPT | Mod: 25

## 2018-02-07 ENCOUNTER — RESULT REVIEW (OUTPATIENT)
Age: 68
End: 2018-02-07

## 2018-02-07 LAB
ACTH SER-ACNC: 30 PG/ML
CORTIS SERPL-MCNC: 7.5 UG/DL
CREAT SPEC-SCNC: 99 MG/DL
HBA1C MFR BLD HPLC: 7.4 %
MICROALBUMIN 24H UR DL<=1MG/L-MCNC: 2.1 MG/DL
MICROALBUMIN/CREAT 24H UR-RTO: 21 MG/G

## 2018-02-13 ENCOUNTER — APPOINTMENT (OUTPATIENT)
Dept: ENDOCRINOLOGY | Facility: CLINIC | Age: 68
End: 2018-02-13
Payer: COMMERCIAL

## 2018-02-13 VITALS
HEART RATE: 79 BPM | WEIGHT: 239 LBS | OXYGEN SATURATION: 98 % | SYSTOLIC BLOOD PRESSURE: 152 MMHG | HEIGHT: 62 IN | BODY MASS INDEX: 43.98 KG/M2 | DIASTOLIC BLOOD PRESSURE: 94 MMHG

## 2018-02-13 PROCEDURE — 99214 OFFICE O/P EST MOD 30 MIN: CPT

## 2018-02-13 RX ORDER — CARVEDILOL 12.5 MG/1
12.5 TABLET, FILM COATED ORAL
Qty: 180 | Refills: 0 | Status: DISCONTINUED | COMMUNITY
Start: 2017-12-03

## 2018-02-23 ENCOUNTER — APPOINTMENT (OUTPATIENT)
Dept: CARDIOLOGY | Facility: CLINIC | Age: 68
End: 2018-02-23
Payer: COMMERCIAL

## 2018-02-23 VITALS
SYSTOLIC BLOOD PRESSURE: 132 MMHG | DIASTOLIC BLOOD PRESSURE: 79 MMHG | HEART RATE: 77 BPM | WEIGHT: 243 LBS | HEIGHT: 62 IN | OXYGEN SATURATION: 100 % | BODY MASS INDEX: 44.72 KG/M2

## 2018-02-23 PROCEDURE — 99214 OFFICE O/P EST MOD 30 MIN: CPT

## 2018-03-05 ENCOUNTER — RX RENEWAL (OUTPATIENT)
Age: 68
End: 2018-03-05

## 2018-04-07 ENCOUNTER — RX RENEWAL (OUTPATIENT)
Age: 68
End: 2018-04-07

## 2018-04-25 NOTE — ED ADULT TRIAGE NOTE - STATUS:
This patient is scheduled to have a transforaminal injection on 5/8/18 and she will need to stop her aspirin for 7 days prior. Please advise.  
Applied

## 2018-05-11 ENCOUNTER — APPOINTMENT (OUTPATIENT)
Dept: CARDIOLOGY | Facility: CLINIC | Age: 68
End: 2018-05-11
Payer: COMMERCIAL

## 2018-05-28 ENCOUNTER — RX RENEWAL (OUTPATIENT)
Age: 68
End: 2018-05-28

## 2018-06-15 ENCOUNTER — NON-APPOINTMENT (OUTPATIENT)
Age: 68
End: 2018-06-15

## 2018-06-15 ENCOUNTER — APPOINTMENT (OUTPATIENT)
Dept: CARDIOLOGY | Facility: CLINIC | Age: 68
End: 2018-06-15
Payer: COMMERCIAL

## 2018-06-15 VITALS
DIASTOLIC BLOOD PRESSURE: 92 MMHG | HEART RATE: 68 BPM | WEIGHT: 242 LBS | BODY MASS INDEX: 44.53 KG/M2 | OXYGEN SATURATION: 99 % | HEIGHT: 62 IN | SYSTOLIC BLOOD PRESSURE: 175 MMHG

## 2018-06-15 VITALS — DIASTOLIC BLOOD PRESSURE: 72 MMHG | SYSTOLIC BLOOD PRESSURE: 140 MMHG

## 2018-06-15 PROCEDURE — 99214 OFFICE O/P EST MOD 30 MIN: CPT

## 2018-06-15 PROCEDURE — 93000 ELECTROCARDIOGRAM COMPLETE: CPT

## 2018-07-05 ENCOUNTER — RX RENEWAL (OUTPATIENT)
Age: 68
End: 2018-07-05

## 2018-07-29 NOTE — ED PROVIDER NOTE - CARDIOVASCULAR NEGATIVE STATEMENT, MLM
ESRD (end stage renal disease)  07/23/2018    Jacqui Freedman  Ureteral leak from previous kidney transplant  07/29/2018    Jacqui Freedman
ESRD (end stage renal disease)  07/23/2018    Active  Jacqui Browne
normal rate and rhythm, no chest pain and no edema.

## 2018-08-15 PROBLEM — R21 RASH AND OTHER NONSPECIFIC SKIN ERUPTION: Chronic | Status: ACTIVE | Noted: 2017-03-18

## 2018-08-15 PROBLEM — E23.0 HYPOPITUITARISM: Chronic | Status: ACTIVE | Noted: 2017-03-18

## 2018-08-18 ENCOUNTER — APPOINTMENT (OUTPATIENT)
Dept: INTERNAL MEDICINE | Facility: CLINIC | Age: 68
End: 2018-08-18
Payer: COMMERCIAL

## 2018-08-18 VITALS
HEIGHT: 62 IN | OXYGEN SATURATION: 98 % | BODY MASS INDEX: 44.72 KG/M2 | RESPIRATION RATE: 14 BRPM | TEMPERATURE: 98.5 F | WEIGHT: 243 LBS | HEART RATE: 76 BPM | DIASTOLIC BLOOD PRESSURE: 88 MMHG | SYSTOLIC BLOOD PRESSURE: 146 MMHG

## 2018-08-18 DIAGNOSIS — N64.4 MASTODYNIA: ICD-10-CM

## 2018-08-18 DIAGNOSIS — Z12.31 ENCOUNTER FOR SCREENING MAMMOGRAM FOR MALIGNANT NEOPLASM OF BREAST: ICD-10-CM

## 2018-08-18 PROCEDURE — 99214 OFFICE O/P EST MOD 30 MIN: CPT

## 2018-08-18 NOTE — HEALTH RISK ASSESSMENT
[Patient reported mammogram was normal] : Patient reported mammogram was normal [Patient reported colonoscopy was normal] : Patient reported colonoscopy was normal [Fully functional (bathing, dressing, toileting, transferring, walking, feeding)] : Fully functional (bathing, dressing, toileting, transferring, walking, feeding) [Fully functional (using the telephone, shopping, preparing meals, housekeeping, doing laundry, using] : Fully functional and needs no help or supervision to perform IADLs (using the telephone, shopping, preparing meals, housekeeping, doing laundry, using transportation, managing medications and managing finances) [Reports changes in hearing] : Reports no changes in hearing [Reports changes in vision] : Reports no changes in vision [Reports changes in dental health] : Reports no changes in dental health [MammogramDate] : 08/14 [ColonoscopyDate] : 10/13

## 2018-08-18 NOTE — HISTORY OF PRESENT ILLNESS
[FreeTextEntry8] : cc: breast pain\par \par Pt c/o sharp pain which started 5 days ago. She took a shower and the warm water helped and bacitracin. Felt like an abscess starting. It gradually went away and gone since yesterday. It hurt to the touch. \par \par BP has been 135/75 on average.

## 2018-08-18 NOTE — PHYSICAL EXAM
[No Acute Distress] : no acute distress [Well Nourished] : well nourished [Well Developed] : well developed [No Respiratory Distress] : no respiratory distress  [Clear to Auscultation] : lungs were clear to auscultation bilaterally [Normal Rate] : normal rate  [Regular Rhythm] : with a regular rhythm

## 2018-08-31 ENCOUNTER — RX RENEWAL (OUTPATIENT)
Age: 68
End: 2018-08-31

## 2018-09-20 ENCOUNTER — RX RENEWAL (OUTPATIENT)
Age: 68
End: 2018-09-20

## 2018-09-24 LAB
25(OH)D3 SERPL-MCNC: 43 NG/ML
ALBUMIN SERPL ELPH-MCNC: 4.4 G/DL
ALP BLD-CCNC: 93 U/L
ALT SERPL-CCNC: 23 U/L
ANION GAP SERPL CALC-SCNC: 14 MMOL/L
AST SERPL-CCNC: 19 U/L
BASOPHILS # BLD AUTO: 0.04 K/UL
BASOPHILS NFR BLD AUTO: 0.6 %
BILIRUB SERPL-MCNC: 0.2 MG/DL
BUN SERPL-MCNC: 17 MG/DL
CALCIUM SERPL-MCNC: 9.7 MG/DL
CHLORIDE SERPL-SCNC: 99 MMOL/L
CHOLEST SERPL-MCNC: 124 MG/DL
CHOLEST/HDLC SERPL: 2.2 RATIO
CO2 SERPL-SCNC: 26 MMOL/L
CREAT SERPL-MCNC: 0.9 MG/DL
EOSINOPHIL # BLD AUTO: 0.21 K/UL
EOSINOPHIL NFR BLD AUTO: 3 %
GLUCOSE SERPL-MCNC: 126 MG/DL
HBA1C MFR BLD HPLC: 6.9 %
HCT VFR BLD CALC: 36.5 %
HDLC SERPL-MCNC: 57 MG/DL
HGB BLD-MCNC: 11.3 G/DL
IMM GRANULOCYTES NFR BLD AUTO: 0.3 %
LDLC SERPL CALC-MCNC: 41 MG/DL
LYMPHOCYTES # BLD AUTO: 1.8 K/UL
LYMPHOCYTES NFR BLD AUTO: 25.6 %
MAN DIFF?: NORMAL
MCHC RBC-ENTMCNC: 27.3 PG
MCHC RBC-ENTMCNC: 31 GM/DL
MCV RBC AUTO: 88.2 FL
MONOCYTES # BLD AUTO: 0.49 K/UL
MONOCYTES NFR BLD AUTO: 7 %
NEUTROPHILS # BLD AUTO: 4.48 K/UL
NEUTROPHILS NFR BLD AUTO: 63.5 %
PLATELET # BLD AUTO: 291 K/UL
POTASSIUM SERPL-SCNC: 4.9 MMOL/L
PROT SERPL-MCNC: 6.4 G/DL
RBC # BLD: 4.14 M/UL
RBC # FLD: 14.2 %
SODIUM SERPL-SCNC: 139 MMOL/L
T4 FREE SERPL-MCNC: 1.7 NG/DL
TRIGL SERPL-MCNC: 131 MG/DL
TSH SERPL-ACNC: 0.91 UIU/ML
WBC # FLD AUTO: 7.04 K/UL

## 2018-09-25 LAB
25(OH)D3 SERPL-MCNC: 42.8 NG/ML
ALBUMIN SERPL ELPH-MCNC: 4.4 G/DL
ALP BLD-CCNC: 95 U/L
ALT SERPL-CCNC: 22 U/L
ANION GAP SERPL CALC-SCNC: 15 MMOL/L
APPEARANCE: CLEAR
AST SERPL-CCNC: 18 U/L
BASOPHILS # BLD AUTO: 0.04 K/UL
BASOPHILS NFR BLD AUTO: 0.6 %
BILIRUB SERPL-MCNC: 0.2 MG/DL
BILIRUBIN URINE: NEGATIVE
BLOOD URINE: NEGATIVE
BUN SERPL-MCNC: 18 MG/DL
CALCIUM SERPL-MCNC: 9.9 MG/DL
CHLORIDE SERPL-SCNC: 99 MMOL/L
CHOLEST SERPL-MCNC: 125 MG/DL
CHOLEST/HDLC SERPL: 2.2 RATIO
CO2 SERPL-SCNC: 25 MMOL/L
COLOR: YELLOW
CREAT SERPL-MCNC: 0.86 MG/DL
CREAT SPEC-SCNC: 48 MG/DL
EOSINOPHIL # BLD AUTO: 0.24 K/UL
EOSINOPHIL NFR BLD AUTO: 3.4 %
FOLATE SERPL-MCNC: >20 NG/ML
GLUCOSE QUALITATIVE U: NEGATIVE MG/DL
GLUCOSE SERPL-MCNC: 129 MG/DL
HBA1C MFR BLD HPLC: 7 %
HCT VFR BLD CALC: 36.3 %
HDLC SERPL-MCNC: 57 MG/DL
HGB BLD-MCNC: 11.4 G/DL
IMM GRANULOCYTES NFR BLD AUTO: 0.3 %
KETONES URINE: NEGATIVE
LDLC SERPL CALC-MCNC: 40 MG/DL
LEUKOCYTE ESTERASE URINE: NEGATIVE
LYMPHOCYTES # BLD AUTO: 1.76 K/UL
LYMPHOCYTES NFR BLD AUTO: 24.6 %
MAN DIFF?: NORMAL
MCHC RBC-ENTMCNC: 27.7 PG
MCHC RBC-ENTMCNC: 31.4 GM/DL
MCV RBC AUTO: 88.3 FL
MICROALBUMIN 24H UR DL<=1MG/L-MCNC: <1.2 MG/DL
MICROALBUMIN/CREAT 24H UR-RTO: NORMAL
MONOCYTES # BLD AUTO: 0.58 K/UL
MONOCYTES NFR BLD AUTO: 8.1 %
NEUTROPHILS # BLD AUTO: 4.51 K/UL
NEUTROPHILS NFR BLD AUTO: 63 %
NITRITE URINE: NEGATIVE
PH URINE: 6
PLATELET # BLD AUTO: 293 K/UL
POTASSIUM SERPL-SCNC: 5 MMOL/L
PROT SERPL-MCNC: 6.5 G/DL
PROTEIN URINE: NEGATIVE MG/DL
RBC # BLD: 4.11 M/UL
RBC # FLD: 14 %
SODIUM SERPL-SCNC: 139 MMOL/L
SPECIFIC GRAVITY URINE: 1.01
TRIGL SERPL-MCNC: 139 MG/DL
TSH SERPL-ACNC: 0.92 UIU/ML
URATE SERPL-MCNC: 5.2 MG/DL
UROBILINOGEN URINE: NEGATIVE MG/DL
VIT B12 SERPL-MCNC: 681 PG/ML
WBC # FLD AUTO: 7.15 K/UL

## 2018-10-05 ENCOUNTER — APPOINTMENT (OUTPATIENT)
Dept: CARDIOLOGY | Facility: CLINIC | Age: 68
End: 2018-10-05
Payer: COMMERCIAL

## 2018-10-05 ENCOUNTER — NON-APPOINTMENT (OUTPATIENT)
Age: 68
End: 2018-10-05

## 2018-10-05 VITALS
HEIGHT: 62 IN | OXYGEN SATURATION: 96 % | DIASTOLIC BLOOD PRESSURE: 85 MMHG | WEIGHT: 242 LBS | BODY MASS INDEX: 44.53 KG/M2 | SYSTOLIC BLOOD PRESSURE: 149 MMHG | HEART RATE: 73 BPM

## 2018-10-05 PROCEDURE — 99214 OFFICE O/P EST MOD 30 MIN: CPT

## 2018-10-05 PROCEDURE — 93000 ELECTROCARDIOGRAM COMPLETE: CPT

## 2018-12-05 ENCOUNTER — RX RENEWAL (OUTPATIENT)
Age: 68
End: 2018-12-05

## 2018-12-07 NOTE — DISCHARGE NOTE ADULT - ADMISSION DATE +STARTOFVISITDATE
Received incoming fax for medication refill from [address] [place] [address] Darlyn. Last visit:11/29/18  Last Med refill:11/01/18    Next Visit Date:  Future Appointments  Date Time Provider Megha Jacksoni   1/31/2019 4:40 PM MD Yumiko Rodriguez [id number]   8/23/2019 3:00 PM Verna Capps,  Resp [address] Maintenance   Topic Date Due    DTaP/Tdap/Td vaccine (1 - Tdap) 03/12/1982    Shingles Vaccine (1 of 2 - 2 Dose Series) 03/12/2013    Diabetic retinal exam  08/27/2016    Flu vaccine (1) 09/01/2018    Diabetic microalbuminuria test  02/27/2019    Lipid screen  02/27/2019    Diabetic foot exam  11/01/2019    A1C test (Diabetic or Prediabetic)  11/01/2019    Potassium monitoring  11/23/2019    Creatinine monitoring  11/23/2019    Colon cancer screen colonoscopy  09/04/2028    Pneumococcal med risk  Completed    Hepatitis C screen  Addressed    HIV screen  Addressed       Hemoglobin A1C (%)   Date Value   11/01/2018 8.8   07/12/2018 8.6   02/15/2018 9.2             ( goal A1C is < 7)   Microalb/Crt.  Ratio (mcg/mg creat)   Date Value   02/27/2018 33 (H)     LDL Cholesterol (mg/dL)   Date Value   02/27/2018 109   04/15/2016 124       (goal LDL is <100)   AST (U/L)   Date Value   02/27/2018 20     ALT (U/L)   Date Value   02/27/2018 21     BUN (mg/dL)   Date Value   11/23/2018 12     BP Readings from Last 3 Encounters:   11/29/18 121/65   11/01/18 136/76   09/04/18 (!) 140/81          (goal 120/80)    All Future Testing planned in [facility]  Lab Frequency Next Occurrence               Patient Active Problem List:     DM2 (diabetes mellitus, type 2) (HCC)     Low back pain     Allergic rhinitis     Traumatic amputation of finger     Family history of prostate cancer     ISIDRO on CPAP     Midline low back pain without sciatica     Hyperglycemia     Essential hypertension
Statement Selected

## 2018-12-19 ENCOUNTER — APPOINTMENT (OUTPATIENT)
Dept: ENDOCRINOLOGY | Facility: CLINIC | Age: 68
End: 2018-12-19
Payer: COMMERCIAL

## 2018-12-19 VITALS — HEART RATE: 80 BPM | DIASTOLIC BLOOD PRESSURE: 90 MMHG | SYSTOLIC BLOOD PRESSURE: 130 MMHG | OXYGEN SATURATION: 98 %

## 2018-12-19 PROCEDURE — 99214 OFFICE O/P EST MOD 30 MIN: CPT

## 2018-12-19 RX ORDER — CYCLOSPORINE 0.5 MG/ML
0.05 EMULSION OPHTHALMIC
Qty: 5 | Refills: 0 | Status: DISCONTINUED | COMMUNITY
Start: 2018-11-15

## 2018-12-19 RX ORDER — LATANOPROST/PF 0.005 %
0.01 DROPS OPHTHALMIC (EYE)
Qty: 2 | Refills: 0 | Status: DISCONTINUED | COMMUNITY
Start: 2018-09-06

## 2018-12-27 ENCOUNTER — APPOINTMENT (OUTPATIENT)
Dept: FAMILY MEDICINE | Facility: CLINIC | Age: 68
End: 2018-12-27
Payer: COMMERCIAL

## 2018-12-28 ENCOUNTER — APPOINTMENT (OUTPATIENT)
Dept: FAMILY MEDICINE | Facility: CLINIC | Age: 68
End: 2018-12-28
Payer: COMMERCIAL

## 2018-12-28 DIAGNOSIS — Z23 ENCOUNTER FOR IMMUNIZATION: ICD-10-CM

## 2018-12-28 PROCEDURE — G0008: CPT

## 2018-12-28 PROCEDURE — 90662 IIV NO PRSV INCREASED AG IM: CPT

## 2019-02-06 ENCOUNTER — RX RENEWAL (OUTPATIENT)
Age: 69
End: 2019-02-06

## 2019-03-05 ENCOUNTER — RX RENEWAL (OUTPATIENT)
Age: 69
End: 2019-03-05

## 2019-03-14 ENCOUNTER — TRANSCRIPTION ENCOUNTER (OUTPATIENT)
Age: 69
End: 2019-03-14

## 2019-03-19 ENCOUNTER — APPOINTMENT (OUTPATIENT)
Dept: CARDIOLOGY | Facility: CLINIC | Age: 69
End: 2019-03-19
Payer: COMMERCIAL

## 2019-03-19 ENCOUNTER — NON-APPOINTMENT (OUTPATIENT)
Age: 69
End: 2019-03-19

## 2019-03-19 VITALS
SYSTOLIC BLOOD PRESSURE: 144 MMHG | DIASTOLIC BLOOD PRESSURE: 77 MMHG | OXYGEN SATURATION: 97 % | BODY MASS INDEX: 45.82 KG/M2 | WEIGHT: 249 LBS | HEIGHT: 62 IN | HEART RATE: 76 BPM

## 2019-03-19 VITALS — DIASTOLIC BLOOD PRESSURE: 88 MMHG | SYSTOLIC BLOOD PRESSURE: 152 MMHG

## 2019-03-19 PROCEDURE — 93000 ELECTROCARDIOGRAM COMPLETE: CPT

## 2019-03-19 PROCEDURE — 99214 OFFICE O/P EST MOD 30 MIN: CPT

## 2019-03-19 NOTE — HISTORY OF PRESENT ILLNESS
[FreeTextEntry1] : 67 year old woman with a history of obesity, hypertension, fibromyalgia. She presented to  with hypertensive emergency with a CVA. Her MRI was positive for CVA in different distributions. \par \par She presents for a followup. \par Since her last visit, she had been having more tremors and body temperature issues (hot and cold spells). She was tapered off of Synthroid but feels a little better.   \par \par She is doing well. She denies any blurry vision, headaches or recent stroke like symptoms. \par She  denies any chest pain, PND, orthopnea, lower extremity edema, near syncope, syncope, strokelike symptoms. She has been more sedentary recently. She is complaint with her medications.  \par She is now going to [facility]. She has lost 5 pounds in 3 weeks. \par  \par  \par \par  \par \par

## 2019-03-19 NOTE — DISCUSSION/SUMMARY
[FreeTextEntry1] : 68 year woman with a history as listed presents for a followup cardiac evaluation. \par Karla is complaining of more headaches and dizziness. She denies any anginal symptoms. Clinically she is euvolemic on exam. Her EKG did not reveal any significant ischemic changes. \par her blood pressure is relatively controlled for her but slightly higher than previous. \par She will continue her Coreg 25mg q12 and  Enalapril 20mg Q12.  She will continue Hydralazine 100mg Q8. She will try Felodipine 2.5mg Qday and she will monitor for increased edema. \par  She did not tolerate the Aldactone. She is only on HCTZ as PRN for lower extremity edema. \par \par  Her MRI reveal infarcts in multiple vascular territories. I have offered her a ILR implantation. She states that she will think about it still. She is not convinced. She wants to hold off for now. She will get a Holter given that her EKG today shows IRBBB (likely lead placement). She will get a 2d echo to assess for any  new structural heart disease, changes in valvular and ventricular function. \par \par She will continue with ASA 81mg Qday. \par \par  She currently has no active cardiac conditions. She may proceed with the planned low risk colonoscopy risk surgery without any further cardiac workup. Routine hemodynamic monitoring is suggested during the procedure. \par \par Exercise and diet counseling was performed in order to reduce her future cardiovascular risk. She will followup with me in  3 months for a BP check or sooner if necessary. \par KARLA will followup with you for all of her other medical needs.

## 2019-03-19 NOTE — PHYSICAL EXAM
[Well Groomed] : well groomed [General Appearance - In No Acute Distress] : no acute distress [Normal Conjunctiva] : the conjunctiva exhibited no abnormalities [Eyelids - No Xanthelasma] : the eyelids demonstrated no xanthelasmas [Normal Oral Mucosa] : normal oral mucosa [No Oral Pallor] : no oral pallor [No Oral Cyanosis] : no oral cyanosis [Normal Jugular Venous A Waves Present] : normal jugular venous A waves present [Normal Jugular Venous V Waves Present] : normal jugular venous V waves present [No Jugular Venous Hale A Waves] : no jugular venous hale A waves [Respiration, Rhythm And Depth] : normal respiratory rhythm and effort [Exaggerated Use Of Accessory Muscles For Inspiration] : no accessory muscle use [Auscultation Breath Sounds / Voice Sounds] : lungs were clear to auscultation bilaterally [Abdomen Soft] : soft [Abdomen Tenderness] : non-tender [Abdomen Mass (___ Cm)] : no abdominal mass palpated [Gait - Sufficient For Exercise Testing] : the gait was sufficient for exercise testing [Abnormal Walk] : normal gait [Nail Clubbing] : no clubbing of the fingernails [Cyanosis, Localized] : no localized cyanosis [Petechial Hemorrhages (___cm)] : no petechial hemorrhages [Skin Color & Pigmentation] : normal skin color and pigmentation [] : no rash [Skin Lesions] : no skin lesions [No Venous Stasis] : no venous stasis [No Skin Ulcers] : no skin ulcer [No Xanthoma] : no  xanthoma was observed [Affect] : the affect was normal [Mood] : the mood was normal [Oriented To Time, Place, And Person] : oriented to person, place, and time [No Anxiety] : not feeling anxious [Normal Rate] : normal [Normal S1] : normal S1 [Rhythm Regular] : regular [Normal S2] : normal S2 [Distant] : the heart sounds were distant [I] : a grade 1 [2+] : left 2+ [Right Carotid Bruit] : no bruit heard over the right carotid [Bruit] : no bruit heard [Left Carotid Bruit] : no bruit heard over the left carotid [No Pitting Edema] : no pitting edema present

## 2019-03-25 ENCOUNTER — RX RENEWAL (OUTPATIENT)
Age: 69
End: 2019-03-25

## 2019-04-04 ENCOUNTER — TRANSCRIPTION ENCOUNTER (OUTPATIENT)
Age: 69
End: 2019-04-04

## 2019-04-05 ENCOUNTER — OUTPATIENT (OUTPATIENT)
Dept: OUTPATIENT SERVICES | Facility: HOSPITAL | Age: 69
LOS: 1 days | End: 2019-04-05
Payer: COMMERCIAL

## 2019-04-05 DIAGNOSIS — Z98.89 OTHER SPECIFIED POSTPROCEDURAL STATES: Chronic | ICD-10-CM

## 2019-04-05 DIAGNOSIS — Z86.010 PERSONAL HISTORY OF COLONIC POLYPS: ICD-10-CM

## 2019-04-05 PROCEDURE — G0105: CPT

## 2019-05-03 ENCOUNTER — APPOINTMENT (OUTPATIENT)
Dept: CARDIOLOGY | Facility: CLINIC | Age: 69
End: 2019-05-03
Payer: COMMERCIAL

## 2019-05-03 PROCEDURE — 93306 TTE W/DOPPLER COMPLETE: CPT

## 2019-05-05 LAB
BASOPHILS # BLD AUTO: 0.07 K/UL
BASOPHILS NFR BLD AUTO: 1 %
CHOLEST SERPL-MCNC: 123 MG/DL
CHOLEST/HDLC SERPL: 2.2 RATIO
EOSINOPHIL # BLD AUTO: 0.14 K/UL
EOSINOPHIL NFR BLD AUTO: 2 %
HCT VFR BLD CALC: 37.4 %
HDLC SERPL-MCNC: 55 MG/DL
HGB BLD-MCNC: 11.8 G/DL
IMM GRANULOCYTES NFR BLD AUTO: 0.4 %
LDLC SERPL CALC-MCNC: 44 MG/DL
LYMPHOCYTES # BLD AUTO: 1.14 K/UL
LYMPHOCYTES NFR BLD AUTO: 16 %
MAN DIFF?: NORMAL
MCHC RBC-ENTMCNC: 27.9 PG
MCHC RBC-ENTMCNC: 31.6 GM/DL
MCV RBC AUTO: 88.4 FL
MONOCYTES # BLD AUTO: 0.55 K/UL
MONOCYTES NFR BLD AUTO: 7.7 %
NEUTROPHILS # BLD AUTO: 5.18 K/UL
NEUTROPHILS NFR BLD AUTO: 72.9 %
PLATELET # BLD AUTO: 298 K/UL
RBC # BLD: 4.23 M/UL
RBC # FLD: 13.8 %
TRIGL SERPL-MCNC: 119 MG/DL
WBC # FLD AUTO: 7.11 K/UL

## 2019-05-09 ENCOUNTER — RESULT REVIEW (OUTPATIENT)
Age: 69
End: 2019-05-09

## 2019-05-09 LAB
T4 FREE SERPL-MCNC: 1.5 NG/DL
TSH SERPL-ACNC: 0.94 UIU/ML

## 2019-05-22 ENCOUNTER — RX RENEWAL (OUTPATIENT)
Age: 69
End: 2019-05-22

## 2019-06-11 ENCOUNTER — NON-APPOINTMENT (OUTPATIENT)
Age: 69
End: 2019-06-11

## 2019-06-11 ENCOUNTER — APPOINTMENT (OUTPATIENT)
Dept: CARDIOLOGY | Facility: CLINIC | Age: 69
End: 2019-06-11
Payer: COMMERCIAL

## 2019-06-11 VITALS
SYSTOLIC BLOOD PRESSURE: 168 MMHG | HEART RATE: 77 BPM | OXYGEN SATURATION: 97 % | HEIGHT: 62 IN | DIASTOLIC BLOOD PRESSURE: 87 MMHG

## 2019-06-11 PROCEDURE — 99215 OFFICE O/P EST HI 40 MIN: CPT

## 2019-06-11 PROCEDURE — 93000 ELECTROCARDIOGRAM COMPLETE: CPT

## 2019-06-11 NOTE — HISTORY OF PRESENT ILLNESS
[FreeTextEntry1] : 67 year old woman with a history of obesity, hypertension, fibromyalgia. She presented to  with hypertensive emergency with a CVA. Her MRI was positive for CVA in different distributions. \par \par She presents for a followup. \par Since her last visit, she has to stop the Felodipine for lower extremity edema. \par Now that she is off of the Synthroid she is feeling better overall. \par \par She is doing well. She denies any blurry vision, headaches or recent stroke like symptoms. \par She  denies any chest pain, PND, orthopnea, lower extremity edema, near syncope, syncope, strokelike symptoms. She has been more sedentary recently. She is complaint with her medications.  \par She is now going to [facility]. She has lost 11 pounds in total.\par \par  \par  \par \par  \par \par

## 2019-06-11 NOTE — DISCUSSION/SUMMARY
[FreeTextEntry1] : 68 year woman with a history as listed presents for a followup cardiac evaluation. \par \par Karla is feeling well overall. She denies any anginal symptoms. Clinically she is euvolemic on exam. Her EKG did not reveal any significant ischemic changes but shows a bifasicular block. Her conduction disease has likely progressed from her hypertesnion. She will get a Holter to rule out arrhythmias. She will undergo a pharmacological nuclear stress test to assess for underlying obstructive CAD. \par She had an echo in 3/2019 that showed normal systolic LV function with mild LVH without any significant other findings, including no significant valvular disease. \par \par her blood pressure is uncontrolled. \par She will continue her Coreg 25mg q12 and  Enalapril 20mg Q12.  She will continue Hydralazine 100mg Q8.  \par  She did not tolerate the Aldactone. She did not tolerates CCBs secondary to lower extremity edema.  She is only on HCTZ as PRN for lower extremity edema. I will try to add clonidine 0.1mg q12. \par \par  Her MRI reveal infarcts in multiple vascular territories. I have offered her a ILR implantation. She states that she will think about it still. She is not convinced. She wants to hold off for now.  \par \par She will continue with ASA 81mg Qday. \par \par Exercise and diet counseling was performed in order to reduce her future cardiovascular risk. She will followup with me in 2 months for a BP check or sooner if necessary. \par KARLA will followup with you for all of her other medical needs.

## 2019-06-11 NOTE — PHYSICAL EXAM
[Well Groomed] : well groomed [Normal Conjunctiva] : the conjunctiva exhibited no abnormalities [General Appearance - In No Acute Distress] : no acute distress [Eyelids - No Xanthelasma] : the eyelids demonstrated no xanthelasmas [Normal Oral Mucosa] : normal oral mucosa [No Oral Pallor] : no oral pallor [No Oral Cyanosis] : no oral cyanosis [Normal Jugular Venous V Waves Present] : normal jugular venous V waves present [Normal Jugular Venous A Waves Present] : normal jugular venous A waves present [No Jugular Venous Hale A Waves] : no jugular venous hale A waves [Respiration, Rhythm And Depth] : normal respiratory rhythm and effort [Abdomen Soft] : soft [Auscultation Breath Sounds / Voice Sounds] : lungs were clear to auscultation bilaterally [Exaggerated Use Of Accessory Muscles For Inspiration] : no accessory muscle use [Abdomen Tenderness] : non-tender [Abdomen Mass (___ Cm)] : no abdominal mass palpated [Abnormal Walk] : normal gait [Gait - Sufficient For Exercise Testing] : the gait was sufficient for exercise testing [Nail Clubbing] : no clubbing of the fingernails [Cyanosis, Localized] : no localized cyanosis [Petechial Hemorrhages (___cm)] : no petechial hemorrhages [No Venous Stasis] : no venous stasis [] : no rash [Skin Color & Pigmentation] : normal skin color and pigmentation [Skin Lesions] : no skin lesions [No Xanthoma] : no  xanthoma was observed [No Skin Ulcers] : no skin ulcer [Mood] : the mood was normal [Affect] : the affect was normal [Oriented To Time, Place, And Person] : oriented to person, place, and time [No Anxiety] : not feeling anxious [Rhythm Regular] : regular [Normal Rate] : normal [Normal S1] : normal S1 [Distant] : the heart sounds were distant [Normal S2] : normal S2 [I] : a grade 1 [2+] : left 2+ [No Pitting Edema] : no pitting edema present [Left Carotid Bruit] : no bruit heard over the left carotid [Right Carotid Bruit] : no bruit heard over the right carotid [Bruit] : no bruit heard

## 2019-06-15 ENCOUNTER — TRANSCRIPTION ENCOUNTER (OUTPATIENT)
Age: 69
End: 2019-06-15

## 2019-06-19 ENCOUNTER — APPOINTMENT (OUTPATIENT)
Dept: ENDOCRINOLOGY | Facility: CLINIC | Age: 69
End: 2019-06-19
Payer: COMMERCIAL

## 2019-06-19 ENCOUNTER — RX RENEWAL (OUTPATIENT)
Age: 69
End: 2019-06-19

## 2019-06-19 VITALS
WEIGHT: 240 LBS | OXYGEN SATURATION: 97 % | HEIGHT: 62 IN | HEART RATE: 79 BPM | BODY MASS INDEX: 44.16 KG/M2 | DIASTOLIC BLOOD PRESSURE: 84 MMHG | SYSTOLIC BLOOD PRESSURE: 150 MMHG

## 2019-06-19 LAB — HBA1C MFR BLD HPLC: 6.6

## 2019-06-19 PROCEDURE — 99214 OFFICE O/P EST MOD 30 MIN: CPT | Mod: 25

## 2019-06-19 PROCEDURE — 83036 HEMOGLOBIN GLYCOSYLATED A1C: CPT | Mod: QW

## 2019-06-19 RX ORDER — SODIUM SULFATE, POTASSIUM SULFATE, MAGNESIUM SULFATE 17.5; 3.13; 1.6 G/ML; G/ML; G/ML
17.5-3.13-1.6 SOLUTION, CONCENTRATE ORAL
Qty: 354 | Refills: 0 | Status: DISCONTINUED | COMMUNITY
Start: 2018-12-28

## 2019-06-19 NOTE — DATA REVIEWED
[FreeTextEntry1] : Labs 6/19/19:\par A1c 6.6%\par \par Labs 5/2019:\par TSH 0.94\par Free T4 1.5\par LDL 44\par HDL 55\par Chol 123\par Trig 119\par \par Labs 9/2018:\par A1c 6.9%\par TSH 0.91\par Free T4 1.7\par Micro/Cr neg.\par LDL 41\par HDL 57\par Chol 124\par Trig 131\par CMP normal except glucose of 126\par Vit D 43\par \par Labs 1/2018:\par A1c 7.4%\par Micro/Cr 21\par Cortisol 7.5\par ACTH 30\par \par 12/2017:\par CBC normal\par CMP normal except glucose of 143\par \par Labs 11/2017:\par LDL 57\par HDL 60\par Chol 141\par Trig 120\par \par Labs 5/1/17:\par A1c 7.2%\par CBC normal\par CMP normal except glucose of 142\par TSH 1.30\par Free T4 1.5\par Vit D 43.1\par \par Labs 7/23/16:\par CBC normal\par CMP normal except glucose of 155\par \par HDL 41\par Chol 208\par Trig 281\par A1c 7.7%\par TSH 1.52\par Free T4 1.4\par ACTH 69\par Cortisol 14.3\par \par Labs 9/19/15:\par ACTH 46\par DHEAS 23.3\par Cortisol 13.4\par \par HDL 45\par Chol 192\par Trig 203\par CMP normal except glucose of 141\par CBC normal\par A1c 7.1%\par ESR 30\par \par MRI 5/2015: No evidence of pituitary adenoma\par \par Labs 1/2015:\par TSH 0.29\par Free T4 1.6\par AM Cortisol 1.4\par FSH 21\par LH 7.0\par Prolactin 11.5\par IGF-1 138\par \par DXA 8/2014: Normal\par

## 2019-06-19 NOTE — HISTORY OF PRESENT ILLNESS
[FreeTextEntry1] : 67 y/o F diagnosed with pre-diabetes in 2010, made some dietary and lifestyle changes. Started to see Dr. Annie Hutson started on metformin 1000 mg daily in 2011 with improvement in A1c and weight loss. Plan was to taper off metformin. In 2012 started to feel "sick" had diarrhea, nausea and vomiting, persistent hyponatremia with multiple admission. Had generalized pain and muscle weakness. Started to develop cold intolerance (when she typically was warm). Had thyroid function tests which were consistent with secondary hypothyroidism. Due to persistent symptoms was admitted to [facility] found to have low cortisol. 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 Started on prednisone 40 mg daily 4/2014 tapered until 8/2015. Felt better initially on steroids. Glucose values started to increase. She was started on Lantus and Humalog. Gained 35 lb. on the steroids still difficulty losing weight. Diagnosed with venous insufficiency recently by vascular. Last cortisol level was 7 from 13 off prednisone. Seen initially by me 11/2015 recommended repeat MRI which was nondiagnostic. Screened for Cushing's given cushingoid features off steroids and elevated ACTH level which was normal. Was on levothyroxine 50 mcg daily. Last TSH 0.94 and Free T4 1.5 (5/2019). Had stopped Synthroid in 3/2019 until a few days ago when she noticed increased fatigue and difficulty losing weight. +occasional polydipsia at night without nocturia or polyuria. Has Accu-Chek glucometer but does not check glucose. No known hypoglycemia. No known complications from diabetes. A1c 6.6%. Tries to monitor carbs. +occasional fatigue with lack of energy, "legs feel tired", no nausea or vomiting, has diarrhea occasionally now improved after starting statin. Last optho 2019. Had received steroid injections 5/2016 for trigger finger no recent steroids. Micro/Cr neg. (9/2018).

## 2019-06-19 NOTE — PHYSICAL EXAM
[No Acute Distress] : no acute distress [Alert] : alert [Well Nourished] : well nourished [Normal Sclera/Conjunctiva] : normal sclera/conjunctiva [Well Developed] : well developed [EOMI] : extra ocular movement intact [Visual Fields Grossly Intact] : visual fields grossly intact [Normal Oropharynx] : the oropharynx was normal [Supple] : the neck was supple [Thyroid Not Enlarged] : the thyroid was not enlarged [No Respiratory Distress] : no respiratory distress [Normal Rate and Effort] : normal respiratory rhythm and effort [No Accessory Muscle Use] : no accessory muscle use [Clear to Auscultation] : lungs were clear to auscultation bilaterally [Normal Rate] : heart rate was normal  [Normal S1, S2] : normal S1 and S2 [Regular Rhythm] : with a regular rhythm [Normal Bowel Sounds] : normal bowel sounds [Soft] : abdomen soft [Not Tender] : non-tender [Not Distended] : not distended [Post Cervical Nodes] : posterior cervical nodes [Anterior Cervical Nodes] : anterior cervical nodes [Kyphosis] : kyphosis present [No Spinal Tenderness] : no spinal tenderness [No Clubbing, Cyanosis] : no clubbing  or cyanosis of the fingernails [Oriented x3] : oriented to person, place, and time [Acanthosis Nigricans] : acanthosis nigricans [Normal Affect] : the affect was normal [Normal Mood] : the mood was normal [de-identified] : Difficult to fully assess due to body habitus [de-identified] : obese [de-identified] : +trace LE edema [de-identified] : normal [de-identified] : cushingoid appearance [de-identified] : petechial rash on abdomen

## 2019-06-19 NOTE — ASSESSMENT
[Long Term Vascular Complications] : long term vascular complications of diabetes [Carbohydrate Consistent Diet] : carbohydrate consistent diet [Importance of Diet and Exercise] : importance of diet and exercise to improve glycemic control, achieve weight loss and improve cardiovascular health [Retinopathy Screening] : Patient was referred to ophthalmology for retinopathy screening [Self Monitoring of Blood Glucose] : self monitoring of blood glucose [FreeTextEntry1] : 67 y/o F w/\par \par 1. 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 This appears to now be resolved with recent nondiagnostic MRI and off prednisone, but clinically appeared to have iatrogenic Cushing's from long term high dose steroids now off steroids. Self-lowered levothyroxine was on 50 mcg twice a week clinically euthyroid. Recommended discontinue. Doubt symptoms are related to hypothyroidism based on TFTs from 5/2019 off the medication. Negative screen with midnight salivary cortisol level. Normal DXA 8/2014. No need for treatment for osteoporosis at this time. Recommend repeat DXA. \par \par 2. Type 2 DM-  A1c now off steroids A1c 6.6%. Goal A1c <7%. Continue with metformin to 2000 mg daily with dietary and lifestyle modifications as patient plans to exercise more. If still >7% may consider adding GLP-1 analog for additional benefit of weight loss.\par \par 3. HTN- BP at goal, c/w current regimen of enalapril, hydralazine, and coreg. \par \par 4. Hyperlipidemia- c/w statin

## 2019-06-28 ENCOUNTER — APPOINTMENT (OUTPATIENT)
Dept: CARDIOLOGY | Facility: CLINIC | Age: 69
End: 2019-06-28
Payer: COMMERCIAL

## 2019-06-28 PROCEDURE — 78452 HT MUSCLE IMAGE SPECT MULT: CPT

## 2019-06-28 PROCEDURE — A9500: CPT

## 2019-06-28 PROCEDURE — 93015 CV STRESS TEST SUPVJ I&R: CPT

## 2019-07-11 ENCOUNTER — APPOINTMENT (OUTPATIENT)
Dept: CARDIOLOGY | Facility: CLINIC | Age: 69
End: 2019-07-11
Payer: COMMERCIAL

## 2019-07-11 VITALS
DIASTOLIC BLOOD PRESSURE: 109 MMHG | HEIGHT: 62 IN | HEART RATE: 75 BPM | SYSTOLIC BLOOD PRESSURE: 187 MMHG | OXYGEN SATURATION: 98 %

## 2019-07-11 PROCEDURE — 93000 ELECTROCARDIOGRAM COMPLETE: CPT

## 2019-07-11 PROCEDURE — 99214 OFFICE O/P EST MOD 30 MIN: CPT

## 2019-07-11 RX ORDER — FELODIPINE 2.5 MG/1
2.5 TABLET, EXTENDED RELEASE ORAL DAILY
Qty: 90 | Refills: 1 | Status: DISCONTINUED | COMMUNITY
Start: 2019-03-19 | End: 2019-07-11

## 2019-07-11 NOTE — PHYSICAL EXAM
[Well Groomed] : well groomed [General Appearance - In No Acute Distress] : no acute distress [Eyelids - No Xanthelasma] : the eyelids demonstrated no xanthelasmas [Normal Conjunctiva] : the conjunctiva exhibited no abnormalities [Normal Oral Mucosa] : normal oral mucosa [No Oral Cyanosis] : no oral cyanosis [No Oral Pallor] : no oral pallor [Normal Jugular Venous A Waves Present] : normal jugular venous A waves present [No Jugular Venous Hale A Waves] : no jugular venous hale A waves [Normal Jugular Venous V Waves Present] : normal jugular venous V waves present [Respiration, Rhythm And Depth] : normal respiratory rhythm and effort [Exaggerated Use Of Accessory Muscles For Inspiration] : no accessory muscle use [Abdomen Soft] : soft [Auscultation Breath Sounds / Voice Sounds] : lungs were clear to auscultation bilaterally [Abdomen Mass (___ Cm)] : no abdominal mass palpated [Abdomen Tenderness] : non-tender [Abnormal Walk] : normal gait [Gait - Sufficient For Exercise Testing] : the gait was sufficient for exercise testing [Cyanosis, Localized] : no localized cyanosis [Petechial Hemorrhages (___cm)] : no petechial hemorrhages [Nail Clubbing] : no clubbing of the fingernails [Skin Color & Pigmentation] : normal skin color and pigmentation [] : no rash [Skin Lesions] : no skin lesions [No Venous Stasis] : no venous stasis [No Skin Ulcers] : no skin ulcer [No Xanthoma] : no  xanthoma was observed [Mood] : the mood was normal [Affect] : the affect was normal [Oriented To Time, Place, And Person] : oriented to person, place, and time [No Anxiety] : not feeling anxious [Normal Rate] : normal [Rhythm Regular] : regular [Distant] : the heart sounds were distant [Normal S2] : normal S2 [Normal S1] : normal S1 [2+] : Carotid: right 2+ [I] : a grade 1 [No Pitting Edema] : no pitting edema present [Right Carotid Bruit] : no bruit heard over the right carotid [Left Carotid Bruit] : no bruit heard over the left carotid [Bruit] : no bruit heard

## 2019-07-11 NOTE — HISTORY OF PRESENT ILLNESS
[FreeTextEntry1] : 67 year old woman with a history of obesity, hypertension, fibromyalgia. She presented to  with hypertensive emergency with a CVA. Her MRI was positive for CVA in different distributions. \par \par She presents for a followup. \par Since her last visit, she could not tolerate the clonidine because of headaches. She also has not taken the Felodipine again. \par \par She is doing well. She denies any blurry vision, headaches or recent stroke like symptoms. \par She  denies any chest pain, PND, orthopnea, lower extremity edema, near syncope, syncope, strokelike symptoms. She has been more sedentary recently. She is complaint with her medications.  \par  \par  \par  \par \par  \par \par

## 2019-07-11 NOTE — DISCUSSION/SUMMARY
[FreeTextEntry1] : 68 year woman with a history as listed presents for a followup cardiac evaluation. \par \par Dulce Maria is feeling well overall. She denies any anginal symptoms. Clinically she is euvolemic on exam. Her EKG did not reveal any significant ischemic changes but shows a bifasicular block. Her conduction disease has likely progressed from her hypertesnion. She will get a Holter to rule out arrhythmias. She had an nuclear stress test unrevealing for ischemia on 6/28/19.\par She had an echo in 3/2019 that showed normal systolic LV function with mild LVH without any significant other findings, including no significant valvular disease. \par \par her blood pressure is uncontrolled. \par She will continue her Coreg 25mg q12 and  Enalapril 20mg Q12.  She will continue Hydralazine 100mg Q8.  \par  She did not tolerate the Aldactone, or Clonidine. She did not tolerates CCBs secondary to lower extremity edema.  She is only on HCTZ as PRN for lower extremity edema. Given her brisk BP reduction to Regadenoson I think another vasodilator would help. I will try Doxazosin 4mg Qday. I will see if we can get a genetic profiling for her antiHTN meds. \par \par  Her MRI reveal infarcts in multiple vascular territories. I have offered her a ILR implantation. She states that she will think about it still. She is not convinced. She wants to hold off for now.  \par \par She will continue with ASA 81mg Qday. \par \par Exercise and diet counseling was performed in order to reduce her future cardiovascular risk. She will followup with me in 2 months for a BP check or sooner if necessary. \austin ACOSTA will followup with you for all of her other medical needs.

## 2019-07-29 ENCOUNTER — RX RENEWAL (OUTPATIENT)
Age: 69
End: 2019-07-29

## 2019-07-30 ENCOUNTER — RX RENEWAL (OUTPATIENT)
Age: 69
End: 2019-07-30

## 2019-08-05 ENCOUNTER — RX RENEWAL (OUTPATIENT)
Age: 69
End: 2019-08-05

## 2019-08-21 ENCOUNTER — RX RENEWAL (OUTPATIENT)
Age: 69
End: 2019-08-21

## 2019-09-09 ENCOUNTER — RX RENEWAL (OUTPATIENT)
Age: 69
End: 2019-09-09

## 2019-10-28 NOTE — DISCHARGE NOTE ADULT - DATE:
c/o chest tightness and pain x 1 hour after playing basketball, denies sob, pain non-reproducible, apical hr confirmed, hr irregular on ascultation c/o chest tightness and pain x 1 hour after playing basketball, denies sob, pain non-reproducible by deep breath or palpation, apical hr confirmed, hr irregular on ascultation 03/21/2017

## 2019-10-31 ENCOUNTER — RX RENEWAL (OUTPATIENT)
Age: 69
End: 2019-10-31

## 2019-11-01 ENCOUNTER — NON-APPOINTMENT (OUTPATIENT)
Age: 69
End: 2019-11-01

## 2019-11-01 ENCOUNTER — APPOINTMENT (OUTPATIENT)
Dept: CARDIOLOGY | Facility: CLINIC | Age: 69
End: 2019-11-01
Payer: COMMERCIAL

## 2019-11-01 VITALS
SYSTOLIC BLOOD PRESSURE: 163 MMHG | HEART RATE: 78 BPM | DIASTOLIC BLOOD PRESSURE: 85 MMHG | OXYGEN SATURATION: 99 % | HEIGHT: 62 IN

## 2019-11-01 VITALS — SYSTOLIC BLOOD PRESSURE: 138 MMHG | DIASTOLIC BLOOD PRESSURE: 70 MMHG

## 2019-11-01 PROCEDURE — 93000 ELECTROCARDIOGRAM COMPLETE: CPT

## 2019-11-01 PROCEDURE — 99214 OFFICE O/P EST MOD 30 MIN: CPT

## 2019-11-01 RX ORDER — CLONIDINE HYDROCHLORIDE 0.1 MG/1
0.1 TABLET ORAL TWICE DAILY
Qty: 180 | Refills: 3 | Status: DISCONTINUED | COMMUNITY
Start: 2019-06-11 | End: 2019-11-01

## 2019-11-01 NOTE — PHYSICAL EXAM
[Well Groomed] : well groomed [General Appearance - In No Acute Distress] : no acute distress [Normal Conjunctiva] : the conjunctiva exhibited no abnormalities [Eyelids - No Xanthelasma] : the eyelids demonstrated no xanthelasmas [Normal Oral Mucosa] : normal oral mucosa [No Oral Pallor] : no oral pallor [No Oral Cyanosis] : no oral cyanosis [Normal Jugular Venous A Waves Present] : normal jugular venous A waves present [Normal Jugular Venous V Waves Present] : normal jugular venous V waves present [No Jugular Venous Hale A Waves] : no jugular venous hale A waves [Respiration, Rhythm And Depth] : normal respiratory rhythm and effort [Exaggerated Use Of Accessory Muscles For Inspiration] : no accessory muscle use [Auscultation Breath Sounds / Voice Sounds] : lungs were clear to auscultation bilaterally [Abdomen Soft] : soft [Abdomen Tenderness] : non-tender [Abdomen Mass (___ Cm)] : no abdominal mass palpated [Abnormal Walk] : normal gait [Gait - Sufficient For Exercise Testing] : the gait was sufficient for exercise testing [Nail Clubbing] : no clubbing of the fingernails [Cyanosis, Localized] : no localized cyanosis [Petechial Hemorrhages (___cm)] : no petechial hemorrhages [Skin Color & Pigmentation] : normal skin color and pigmentation [] : no rash [No Venous Stasis] : no venous stasis [Skin Lesions] : no skin lesions [No Skin Ulcers] : no skin ulcer [No Xanthoma] : no  xanthoma was observed [Oriented To Time, Place, And Person] : oriented to person, place, and time [Affect] : the affect was normal [Mood] : the mood was normal [No Anxiety] : not feeling anxious [Normal Rate] : normal [Rhythm Regular] : regular [Normal S1] : normal S1 [Normal S2] : normal S2 [Distant] : the heart sounds were distant [I] : a grade 1 [2+] : left 2+ [No Pitting Edema] : no pitting edema present [Right Carotid Bruit] : no bruit heard over the right carotid [Left Carotid Bruit] : no bruit heard over the left carotid [Bruit] : no bruit heard

## 2019-11-01 NOTE — HISTORY OF PRESENT ILLNESS
[FreeTextEntry1] : 67 year old woman with a history of obesity, hypertension, fibromyalgia. She presented to  with hypertensive emergency with a CVA. Her MRI was positive for CVA in different distributions. \par \par She presents for a followup. \par Since her last visit, she could not tolerate the Doxazosin because of dizziness and fatigue. \par \par She is doing well. She denies any blurry vision, headaches or recent stroke like symptoms. \par She  denies any chest pain, PND, orthopnea, lower extremity edema, near syncope, syncope, strokelike symptoms. She has been more sedentary recently. She is complaint with her medications.  \par  \par  \par  \par \par  \par \par

## 2019-11-01 NOTE — DISCUSSION/SUMMARY
[FreeTextEntry1] : 68 year woman with a history as listed presents for a followup cardiac evaluation. \par \par Dulce Maria is feeling well overall. She denies any anginal symptoms. Clinically she is euvolemic on exam. Her EKG is unchanged from previous.  Her conduction disease has likely progressed from her hypertension. She will get a Holter to rule out arrhythmias but wants to wait till spring to do it. She had an nuclear stress test unrevealing for ischemia on 6/28/19.\par She had an echo in 3/2019 that showed normal systolic LV function with mild LVH without any significant other findings, including no significant valvular disease. \par \par her blood pressure is uncontrolled. \par She will continue her Coreg 25mg q12 and  Enalapril 20mg Q12.  She will continue Hydralazine 100mg Q8.  \par  She did not tolerate the Aldactone, or Clonidine. She did not tolerates CCBs secondary to lower extremity edema.  She is only on HCTZ as PRN for lower extremity edema (historically becomes hypoNa). Given her brisk BP reduction to Regadenoson I think another vasodilator would help. I think she had a too much of a brisk response to the Doxasozin. She will try 1mg HS. \par \par  Her MRI reveal infarcts in multiple vascular territories. I have offered her a ILR implantation. She states that she will think about it still. She is not convinced. She wants to hold off for now.  \par \par She will continue with ASA 81mg Qday. \par \par Exercise and diet counseling was performed in order to reduce her future cardiovascular risk. She will followup with me in 3 months for a BP check or sooner if necessary. \austin ACOSTA will followup with you for all of her other medical needs.

## 2019-12-05 ENCOUNTER — MEDICATION RENEWAL (OUTPATIENT)
Age: 69
End: 2019-12-05

## 2019-12-23 ENCOUNTER — RX RENEWAL (OUTPATIENT)
Age: 69
End: 2019-12-23

## 2019-12-24 ENCOUNTER — RX RENEWAL (OUTPATIENT)
Age: 69
End: 2019-12-24

## 2020-01-03 ENCOUNTER — APPOINTMENT (OUTPATIENT)
Dept: INTERNAL MEDICINE | Facility: CLINIC | Age: 70
End: 2020-01-03
Payer: COMMERCIAL

## 2020-01-03 VITALS
RESPIRATION RATE: 16 BRPM | TEMPERATURE: 98.6 F | WEIGHT: 250 LBS | OXYGEN SATURATION: 96 % | BODY MASS INDEX: 45.73 KG/M2 | SYSTOLIC BLOOD PRESSURE: 164 MMHG | HEART RATE: 88 BPM | DIASTOLIC BLOOD PRESSURE: 100 MMHG

## 2020-01-03 VITALS — DIASTOLIC BLOOD PRESSURE: 84 MMHG | SYSTOLIC BLOOD PRESSURE: 160 MMHG

## 2020-01-03 PROCEDURE — 99214 OFFICE O/P EST MOD 30 MIN: CPT

## 2020-01-03 RX ORDER — ECONAZOLE NITRATE 10 MG/G
1 CREAM TOPICAL TWICE DAILY
Qty: 1 | Refills: 1 | Status: DISCONTINUED | COMMUNITY
Start: 2017-07-10 | End: 2020-01-03

## 2020-01-03 NOTE — HISTORY OF PRESENT ILLNESS
[FreeTextEntry1] : BP check and follow up [de-identified] : Pt is here for medication refills. She works from home. Her BP fluctuates. Gets nervous in the office. \par

## 2020-01-12 ENCOUNTER — RX RENEWAL (OUTPATIENT)
Age: 70
End: 2020-01-12

## 2020-01-14 ENCOUNTER — APPOINTMENT (OUTPATIENT)
Dept: ENDOCRINOLOGY | Facility: CLINIC | Age: 70
End: 2020-01-14
Payer: COMMERCIAL

## 2020-01-14 VITALS
HEART RATE: 86 BPM | OXYGEN SATURATION: 99 % | TEMPERATURE: 97.7 F | BODY MASS INDEX: 45.64 KG/M2 | RESPIRATION RATE: 16 BRPM | WEIGHT: 248 LBS | HEIGHT: 62 IN

## 2020-01-14 LAB — HBA1C MFR BLD HPLC: 6.7

## 2020-01-14 PROCEDURE — 83036 HEMOGLOBIN GLYCOSYLATED A1C: CPT | Mod: QW

## 2020-01-14 PROCEDURE — 99214 OFFICE O/P EST MOD 30 MIN: CPT | Mod: 25

## 2020-01-14 NOTE — REVIEW OF SYSTEMS
[Recent Weight Loss (___ Lbs)] : recent [unfilled] ~Ulb weight loss [Dysphagia] : dysphagia [Joint Pain] : joint pain [Anxiety] : anxiety [All other systems negative] : All other systems negative [Fatigue] : no fatigue [Recent Weight Gain (___ Lbs)] : no recent weight gain [Visual Field Defect] : no visual field defect [Dysphonia] : no dysphonia [Blurry Vision] : no blurred vision [Palpitations] : no palpitations [Chest Pain] : no chest pain [Polyuria] : no polyuria [Headache] : no headaches [Polydipsia] : no polydipsia [Cushing's Stigmata] : no Cushing's stigmata [FreeTextEntry9] : knee pain [FreeTextEntry8] : Menopause in 2004 [FreeTextEntry2] : difficulty losing weight, lost 13 lb.

## 2020-01-14 NOTE — DATA REVIEWED
[FreeTextEntry1] : Labs 1/14/20:\par A1c 6.7%\par \par Labs 6/19/19:\par A1c 6.6%\par \par Labs 5/2019:\par TSH 0.94\par Free T4 1.5\par LDL 44\par HDL 55\par Chol 123\par Trig 119\par \par Labs 9/2018:\par A1c 6.9%\par TSH 0.91\par Free T4 1.7\par Micro/Cr neg.\par LDL 41\par HDL 57\par Chol 124\par Trig 131\par CMP normal except glucose of 126\par Vit D 43\par \par Labs 1/2018:\par A1c 7.4%\par Micro/Cr 21\par Cortisol 7.5\par ACTH 30\par \par 12/2017:\par CBC normal\par CMP normal except glucose of 143\par \par Labs 11/2017:\par LDL 57\par HDL 60\par Chol 141\par Trig 120\par \par Labs 5/1/17:\par A1c 7.2%\par CBC normal\par CMP normal except glucose of 142\par TSH 1.30\par Free T4 1.5\par Vit D 43.1\par \par Labs 7/23/16:\par CBC normal\par CMP normal except glucose of 155\par \par HDL 41\par Chol 208\par Trig 281\par A1c 7.7%\par TSH 1.52\par Free T4 1.4\par ACTH 69\par Cortisol 14.3\par \par Labs 9/19/15:\par ACTH 46\par DHEAS 23.3\par Cortisol 13.4\par \par HDL 45\par Chol 192\par Trig 203\par CMP normal except glucose of 141\par CBC normal\par A1c 7.1%\par ESR 30\par \par MRI 5/2015: No evidence of pituitary adenoma\par \par Labs 1/2015:\par TSH 0.29\par Free T4 1.6\par AM Cortisol 1.4\par FSH 21\par LH 7.0\par Prolactin 11.5\par IGF-1 138\par \par DXA 8/2014: Normal\par

## 2020-01-14 NOTE — HISTORY OF PRESENT ILLNESS
[FreeTextEntry1] : 70 y/o F diagnosed with pre-diabetes in 2010, made some dietary and lifestyle changes. Started to see Dr. Annie Hutson started on metformin 1000 mg daily in 2011 with improvement in A1c and weight loss. Plan was to taper off metformin. In 2012 started to feel "sick" had diarrhea, nausea and vomiting, persistent hyponatremia with multiple admission. Had generalized pain and muscle weakness. Started to develop cold intolerance (when she typically was warm). Had thyroid function tests which were consistent with secondary hypothyroidism. Due to persistent symptoms was admitted to [facility] found to have low cortisol. 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 Started on prednisone 40 mg daily 4/2014 tapered until 8/2015. Felt better initially on steroids. Glucose values started to increase. She was started on Lantus and Humalog. Gained 35 lb. on the steroids still difficulty losing weight. Diagnosed with venous insufficiency recently by vascular. Last cortisol level was 7 from 13 off prednisone. Seen initially by me 11/2015 recommended repeat MRI which was nondiagnostic. Screened for Cushing's given cushingoid features off steroids and elevated ACTH level which was normal. Was on levothyroxine 50 mcg daily. Last TSH 0.94 and Free T4 1.5 (5/2019). Had stopped Synthroid in 3/2019 until 5/2019 when she noticed increased fatigue and difficulty losing weight. +occasional polydipsia at night without nocturia or polyuria. Has Accu-Chek glucometer but does not check glucose. No known hypoglycemia. No known complications from diabetes. A1c 6.7%. Tries to monitor carbs. +occasional fatigue with lack of energy, "legs feel tired", no nausea or vomiting, has diarrhea occasionally now improved after starting statin. Last optho 2019. Had received steroid injections 5/2016 for trigger finger no recent steroids. Micro/Cr neg. (9/2018).

## 2020-01-14 NOTE — PHYSICAL EXAM
[Alert] : alert [No Acute Distress] : no acute distress [Well Nourished] : well nourished [Well Developed] : well developed [Normal Sclera/Conjunctiva] : normal sclera/conjunctiva [EOMI] : extra ocular movement intact [Visual Fields Grossly Intact] : visual fields grossly intact [Normal Oropharynx] : the oropharynx was normal [Supple] : the neck was supple [Thyroid Not Enlarged] : the thyroid was not enlarged [No Respiratory Distress] : no respiratory distress [Normal Rate and Effort] : normal respiratory rhythm and effort [No Accessory Muscle Use] : no accessory muscle use [Clear to Auscultation] : lungs were clear to auscultation bilaterally [Normal Rate] : heart rate was normal  [Normal S1, S2] : normal S1 and S2 [Regular Rhythm] : with a regular rhythm [Normal Bowel Sounds] : normal bowel sounds [Not Tender] : non-tender [Soft] : abdomen soft [Not Distended] : not distended [Post Cervical Nodes] : posterior cervical nodes [Anterior Cervical Nodes] : anterior cervical nodes [Kyphosis] : kyphosis present [No Spinal Tenderness] : no spinal tenderness [No Clubbing, Cyanosis] : no clubbing  or cyanosis of the fingernails [Acanthosis Nigricans] : acanthosis nigricans [Oriented x3] : oriented to person, place, and time [Normal Affect] : the affect was normal [Normal Mood] : the mood was normal [de-identified] : +trace LE edema [de-identified] : Difficult to fully assess due to body habitus [de-identified] : obese [de-identified] : normal [de-identified] : petechial rash on abdomen [de-identified] : cushingoid appearance

## 2020-01-14 NOTE — ASSESSMENT
[Long Term Vascular Complications] : long term vascular complications of diabetes [Carbohydrate Consistent Diet] : carbohydrate consistent diet [Importance of Diet and Exercise] : importance of diet and exercise to improve glycemic control, achieve weight loss and improve cardiovascular health [Self Monitoring of Blood Glucose] : self monitoring of blood glucose [Retinopathy Screening] : Patient was referred to ophthalmology for retinopathy screening [FreeTextEntry1] : 70 y/o F w/\par \par 1. 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 This appears to now be resolved with recent nondiagnostic MRI and off prednisone, but clinically appeared to have iatrogenic Cushing's from long term high dose steroids now off steroids. Self-lowered levothyroxine was on 50 mcg twice a week clinically euthyroid. Recommended discontinue. Doubt symptoms are related to hypothyroidism based on TFTs from 5/2019 off the medication. Negative screen with midnight salivary cortisol level. Normal DXA 8/2014. Awaiting repeat DXA (script given by PCP). No need for treatment for osteoporosis at this time.\par \par 2. Type 2 DM-  A1c now off steroids A1c 6.7%. Goal A1c <7%. Continue with metformin to 2000 mg daily with dietary and lifestyle modifications as patient plans to exercise more. If >7% may consider adding GLP-1 analog for additional benefit of weight loss.\par \par 3. HTN- BP at goal, c/w current regimen of enalapril, hydralazine, and coreg. \par \par 4. Hyperlipidemia- c/w statin

## 2020-03-23 ENCOUNTER — RX RENEWAL (OUTPATIENT)
Age: 70
End: 2020-03-23

## 2020-04-06 ENCOUNTER — RX RENEWAL (OUTPATIENT)
Age: 70
End: 2020-04-06

## 2020-05-26 NOTE — REVIEW OF SYSTEMS
"Angela Santo is a 39 year old female who is being evaluated via a billable telephone visit.      The patient has been notified of following:     \"This telephone visit will be conducted via a call between you and your physician/provider. We have found that certain health care needs can be provided without the need for a physical exam.  This service lets us provide the care you need with a short phone conversation.  If a prescription is necessary we can send it directly to your pharmacy.  If lab work is needed we can place an order for that and you can then stop by our lab to have the test done at a later time.    Telephone visits are billed at different rates depending on your insurance coverage. During this emergency period, for some insurers they may be billed the same as an in-person visit.  Please reach out to your insurance provider with any questions.    If during the course of the call the physician/provider feels a telephone visit is not appropriate, you will not be charged for this service.\"    Patient has given verbal consent for Telephone visit?  Yes    What phone number would you like to be contacted at? [phone]    How would you like to obtain your AVS? Otf Ragland     Angela Santo is a 39 year old female who presents via phone visit today for the following health issues:    HPI  Medication Followup of Adderall    Taking Medication as prescribed: yes    Side Effects:  None    Medication Helping Symptoms:  yes        Taking Adderall XR 20 mg daily during workdays and occasionally a 5 mg boost in the afternoon.  Reports she has been working more into the evening as of late, so as needed to take the 5 mg Adderall more frequently.    She feels a bit more but distracted during the day.  This could be related to her kids being home from school as a source of distraction.  She also feels like maybe the 20 mg is not working as well for her as it had previously.    She is interested in trying " a higher dose of the XR.        Patient Active Problem List   Diagnosis     History of protein S deficiency     Attention deficit hyperactivity disorder (ADHD), predominantly inattentive type     History of pulmonary embolism     Past Surgical History:   Procedure Laterality Date     COSMETIC MAMMOPLASTY AUGMENTATION INFRAMAMMARY APPROACH N/A 11/22/2019    Procedure: Inframammary dual plane augmentation mammoplasty with Tower MemoryGel breast implant, smooth, round, moderate plus profile, 375 mL on each side.;  Surgeon: Tyrone Fierro MD;  Location: [facility] OR     GYN SURGERY         Social History     Tobacco Use     Smoking status: Never Smoker     Smokeless tobacco: Never Used   Substance Use Topics     Alcohol use: No     Family History   Family history unknown: Yes         Current Outpatient Medications   Medication Sig Dispense Refill     albuterol (PROAIR HFA/PROVENTIL HFA/VENTOLIN HFA) 108 (90 Base) MCG/ACT inhaler Inhale 2 puffs into the lungs every 4 hours as needed for shortness of breath / dyspnea or wheezing 1 Inhaler 3     amphetamine-dextroamphetamine (ADDERALL XR) 25 MG 24 hr capsule Take 1 capsule (25 mg) by mouth daily 30 capsule 0     [START ON 6/26/2020] amphetamine-dextroamphetamine (ADDERALL XR) 25 MG 24 hr capsule Take 1 capsule (25 mg) by mouth daily 30 capsule 0     [START ON 7/27/2020] amphetamine-dextroamphetamine (ADDERALL XR) 25 MG 24 hr capsule Take 1 capsule (25 mg) by mouth daily 30 capsule 0     amphetamine-dextroamphetamine (ADDERALL) 5 MG tablet Take 1 tablet (5 mg) by mouth daily 30 tablet 0     [START ON 6/26/2020] amphetamine-dextroamphetamine (ADDERALL) 5 MG tablet Take 1 tablet (5 mg) by mouth daily 30 tablet 0     [START ON 7/27/2020] amphetamine-dextroamphetamine (ADDERALL) 5 MG tablet Take 1 tablet (5 mg) by mouth daily 30 tablet 0     EPINEPHrine (EPIPEN 2-TERRA) 0.3 MG/0.3ML injection 2-pack Inject 0.3 mLs (0.3 mg) into the muscle once as needed for anaphylaxis 1  each 0     traZODone (DESYREL) 50 MG tablet Take 1 tablet (50 mg) by mouth At Bedtime 30 tablet 0       Reviewed and updated as needed this visit by Provider         Review of Systems   Constitutional, HEENT, cardiovascular, pulmonary, gi and gu systems are negative, except as otherwise noted.       Objective   Reported vitals:  There were no vitals taken for this visit.   PSYCH: Alert and oriented times 3; coherent speech, normal   rate and volume, able to articulate logical thoughts, able   to abstract reason, no tangential thoughts, no hallucinations   or delusions  Her affect is normal  RESP: No cough, no audible wheezing, able to talk in full sentences  Remainder of exam unable to be completed due to telephone visits    Diagnostic Test Results:  none         Assessment/Plan:  1. Attention deficit hyperactivity disorder (ADHD), predominantly inattentive type -we will try 25 mg XR Adderall daily for the next 3 months.  If this is working well for her, I encouraged her to reach out via [employer] or call my nurses with an update.  - amphetamine-dextroamphetamine (ADDERALL) 5 MG tablet; Take 1 tablet (5 mg) by mouth daily  Dispense: 30 tablet; Refill: 0  - amphetamine-dextroamphetamine (ADDERALL) 5 MG tablet; Take 1 tablet (5 mg) by mouth daily  Dispense: 30 tablet; Refill: 0  - amphetamine-dextroamphetamine (ADDERALL) 5 MG tablet; Take 1 tablet (5 mg) by mouth daily  Dispense: 30 tablet; Refill: 0  - amphetamine-dextroamphetamine (ADDERALL XR) 25 MG 24 hr capsule; Take 1 capsule (25 mg) by mouth daily  Dispense: 30 capsule; Refill: 0  - amphetamine-dextroamphetamine (ADDERALL XR) 25 MG 24 hr capsule; Take 1 capsule (25 mg) by mouth daily  Dispense: 30 capsule; Refill: 0  - amphetamine-dextroamphetamine (ADDERALL XR) 25 MG 24 hr capsule; Take 1 capsule (25 mg) by mouth daily  Dispense: 30 capsule; Refill: 0    2. Wheezing -she has occasional shortness of breath related to seasonal allergies.  She requests refills of  her albuterol inhaler.  She also needs an update on her EpiPen.  - albuterol (PROAIR HFA/PROVENTIL HFA/VENTOLIN HFA) 108 (90 Base) MCG/ACT inhaler; Inhale 2 puffs into the lungs every 4 hours as needed for shortness of breath / dyspnea or wheezing  Dispense: 1 Inhaler; Refill: 3  - EPINEPHrine (EPIPEN 2-TERRA) 0.3 MG/0.3ML injection 2-pack; Inject 0.3 mLs (0.3 mg) into the muscle once as needed for anaphylaxis  Dispense: 1 each; Refill: 0    Return in about 6 months (around 11/26/2020) for ADHD recheck.      Phone call duration:  8 minutes    Stacie Delgado MD         [see HPI] : see HPI [Negative] : Heme/Lymph [Dyspnea on exertion] : not dyspnea during exertion

## 2020-06-19 ENCOUNTER — NON-APPOINTMENT (OUTPATIENT)
Age: 70
End: 2020-06-19

## 2020-06-19 ENCOUNTER — APPOINTMENT (OUTPATIENT)
Dept: CARDIOLOGY | Facility: CLINIC | Age: 70
End: 2020-06-19
Payer: COMMERCIAL

## 2020-06-19 VITALS
OXYGEN SATURATION: 97 % | DIASTOLIC BLOOD PRESSURE: 95 MMHG | BODY MASS INDEX: 46.56 KG/M2 | SYSTOLIC BLOOD PRESSURE: 178 MMHG | WEIGHT: 253 LBS | HEIGHT: 62 IN | HEART RATE: 82 BPM

## 2020-06-19 VITALS — DIASTOLIC BLOOD PRESSURE: 90 MMHG | SYSTOLIC BLOOD PRESSURE: 148 MMHG

## 2020-06-19 PROCEDURE — 93000 ELECTROCARDIOGRAM COMPLETE: CPT

## 2020-06-19 PROCEDURE — 99214 OFFICE O/P EST MOD 30 MIN: CPT

## 2020-06-19 NOTE — DISCUSSION/SUMMARY
[FreeTextEntry1] : 69 year woman with a history as listed presents for a followup cardiac evaluation. \par \par Dulce Maria is feeling well overall. She denies any anginal symptoms. Clinically she is euvolemic on exam. Her EKG is unchanged from previous.  Her conduction disease has likely progressed from her hypertension. She will get a Holter to rule out arrhythmias but wants to wait till spring to do it. She had an nuclear stress test unrevealing for ischemia on 6/28/19.\par She had an echo in 3/2019 that showed normal systolic LV function with mild LVH without any significant other findings, including no significant valvular disease. \par \par her blood pressure is uncontrolled. \par She will continue her Coreg 25mg q12 and  Enalapril 20mg Q12.  She will continue Hydralazine 100mg Q8.  \par  She did not tolerate the Aldactone, or Clonidine. She did not tolerates CCBs secondary to lower extremity edema.  She is only on HCTZ as PRN for lower extremity edema (historically becomes hypoNa). Given her brisk BP reduction to Regadenoson I think another vasodilator would help. \par She tolerated the Doxazosin. I will increase 1mg q12. \par \par  Her MRI reveal infarcts in multiple vascular territories. I have offered her a ILR implantation. She states that she will think about it still. She is not convinced. She wants to hold off for now.  \par \par She will continue with ASA 81mg Qday. \par \par Exercise and diet counseling was performed in order to reduce her future cardiovascular risk. She will followup with me in 3 months for a BP check or sooner if necessary. \austin ACOSTA will followup with you for all of her other medical needs.

## 2020-06-19 NOTE — PHYSICAL EXAM
[Well Groomed] : well groomed [General Appearance - In No Acute Distress] : no acute distress [Normal Conjunctiva] : the conjunctiva exhibited no abnormalities [Eyelids - No Xanthelasma] : the eyelids demonstrated no xanthelasmas [Normal Oral Mucosa] : normal oral mucosa [No Oral Pallor] : no oral pallor [No Oral Cyanosis] : no oral cyanosis [Normal Jugular Venous A Waves Present] : normal jugular venous A waves present [Normal Jugular Venous V Waves Present] : normal jugular venous V waves present [No Jugular Venous Hale A Waves] : no jugular venous hale A waves [Respiration, Rhythm And Depth] : normal respiratory rhythm and effort [Exaggerated Use Of Accessory Muscles For Inspiration] : no accessory muscle use [Auscultation Breath Sounds / Voice Sounds] : lungs were clear to auscultation bilaterally [Abdomen Soft] : soft [Abdomen Tenderness] : non-tender [Abdomen Mass (___ Cm)] : no abdominal mass palpated [Abnormal Walk] : normal gait [Gait - Sufficient For Exercise Testing] : the gait was sufficient for exercise testing [Nail Clubbing] : no clubbing of the fingernails [Cyanosis, Localized] : no localized cyanosis [Petechial Hemorrhages (___cm)] : no petechial hemorrhages [Skin Color & Pigmentation] : normal skin color and pigmentation [] : no rash [No Venous Stasis] : no venous stasis [Skin Lesions] : no skin lesions [No Skin Ulcers] : no skin ulcer [No Xanthoma] : no  xanthoma was observed [Oriented To Time, Place, And Person] : oriented to person, place, and time [No Anxiety] : not feeling anxious [Mood] : the mood was normal [Affect] : the affect was normal [Rhythm Regular] : regular [Normal Rate] : normal [Normal S1] : normal S1 [Normal S2] : normal S2 [Distant] : the heart sounds were distant [2+] : Carotid: right 2+ [I] : a grade 1 [Bruit] : no bruit heard [Left Carotid Bruit] : no bruit heard over the left carotid [Right Carotid Bruit] : no bruit heard over the right carotid [No Pitting Edema] : no pitting edema present

## 2020-06-19 NOTE — HISTORY OF PRESENT ILLNESS
[FreeTextEntry1] : 69 year old woman with a history of obesity, hypertension, fibromyalgia. She presented to  with hypertensive emergency with a CVA. Her MRI was positive for CVA in different distributions. \par \par She presents for a followup. \par Since her last visit, she has been quarantined at home because of the COVID pandemic. \par She has been more anxious and stressed. \par She has had a few mechanical falls. \par She denies any blurry vision, headaches or recent stroke like symptoms. \par She  denies any chest pain, PND, orthopnea, lower extremity edema, near syncope, syncope, strokelike symptoms. She has been more sedentary recently. She is complaint with her medications.  \par  \par  \par  \par \par  \par \par

## 2020-06-22 ENCOUNTER — APPOINTMENT (OUTPATIENT)
Dept: INTERNAL MEDICINE | Facility: CLINIC | Age: 70
End: 2020-06-22
Payer: COMMERCIAL

## 2020-06-22 VITALS
BODY MASS INDEX: 46.56 KG/M2 | DIASTOLIC BLOOD PRESSURE: 72 MMHG | SYSTOLIC BLOOD PRESSURE: 132 MMHG | RESPIRATION RATE: 14 BRPM | HEART RATE: 81 BPM | HEIGHT: 62 IN | WEIGHT: 253 LBS | TEMPERATURE: 97.7 F | OXYGEN SATURATION: 98 %

## 2020-06-22 PROCEDURE — 99214 OFFICE O/P EST MOD 30 MIN: CPT

## 2020-06-22 NOTE — HISTORY OF PRESENT ILLNESS
[FreeTextEntry8] : cc: accidentally swallowed gabapentin\par \par Pt has 100 lb yellow lab on gabapentin. Pt was going to give the dog its gabapentin and she was distracted and took it herself.\par \par May 13th she was in her garage and tripped. Had

## 2020-06-22 NOTE — PHYSICAL EXAM
[de-identified] : left knee with erythema and scab, warm [Normal] : affect was normal and insight and judgment were intact

## 2020-06-26 ENCOUNTER — APPOINTMENT (OUTPATIENT)
Dept: INTERNAL MEDICINE | Facility: CLINIC | Age: 70
End: 2020-06-26
Payer: COMMERCIAL

## 2020-06-26 VITALS
HEIGHT: 62 IN | HEART RATE: 96 BPM | DIASTOLIC BLOOD PRESSURE: 80 MMHG | OXYGEN SATURATION: 97 % | SYSTOLIC BLOOD PRESSURE: 140 MMHG | BODY MASS INDEX: 46.56 KG/M2 | RESPIRATION RATE: 14 BRPM | TEMPERATURE: 98.9 F | WEIGHT: 253 LBS

## 2020-06-26 DIAGNOSIS — M79.605 PAIN IN LEFT LEG: ICD-10-CM

## 2020-06-26 DIAGNOSIS — M25.562 PAIN IN LEFT KNEE: ICD-10-CM

## 2020-06-26 DIAGNOSIS — L03.119 CELLULITIS OF UNSPECIFIED PART OF LIMB: ICD-10-CM

## 2020-06-26 PROCEDURE — 99214 OFFICE O/P EST MOD 30 MIN: CPT

## 2020-06-26 RX ORDER — DOXYCYCLINE 100 MG/1
100 CAPSULE ORAL
Qty: 20 | Refills: 0 | Status: DISCONTINUED | COMMUNITY
Start: 2020-06-22 | End: 2020-06-26

## 2020-06-26 NOTE — HISTORY OF PRESENT ILLNESS
[FreeTextEntry8] : Pt here for left knee cellulitis. Pt s/p fall in garage, has an abrasion on the left knee. Was seen on 6/22 in office and started on doxy for cellulitis. Erythema around wound has now extended down left leg despite being on abx. Pt also complaining of significant pain in the knee and in the lower leg. No fever/chills.

## 2020-06-26 NOTE — PHYSICAL EXAM
[No Acute Distress] : no acute distress [Well Developed] : well developed [Well Nourished] : well nourished [Well-Appearing] : well-appearing [Normal Sclera/Conjunctiva] : normal sclera/conjunctiva [EOMI] : extraocular movements intact [PERRL] : pupils equal round and reactive to light [Normal Outer Ear/Nose] : the outer ears and nose were normal in appearance [Normal Oropharynx] : the oropharynx was normal [No JVD] : no jugular venous distention [No Lymphadenopathy] : no lymphadenopathy [No Respiratory Distress] : no respiratory distress  [Supple] : supple [Thyroid Normal, No Nodules] : the thyroid was normal and there were no nodules present [No Accessory Muscle Use] : no accessory muscle use [Clear to Auscultation] : lungs were clear to auscultation bilaterally [Normal Rate] : normal rate  [Regular Rhythm] : with a regular rhythm [No Carotid Bruits] : no carotid bruits [No Murmur] : no murmur heard [Normal S1, S2] : normal S1 and S2 [Pedal Pulses Present] : the pedal pulses are present [No Abdominal Bruit] : a ~M bruit was not heard ~T in the abdomen [No Varicosities] : no varicosities [No Palpable Aorta] : no palpable aorta [No Edema] : there was no peripheral edema [No Extremity Clubbing/Cyanosis] : no extremity clubbing/cyanosis [Soft] : abdomen soft [No Masses] : no abdominal mass palpated [Non-distended] : non-distended [Non Tender] : non-tender [No HSM] : no HSM [Normal Bowel Sounds] : normal bowel sounds [Normal Posterior Cervical Nodes] : no posterior cervical lymphadenopathy [No Spinal Tenderness] : no spinal tenderness [No CVA Tenderness] : no CVA  tenderness [Normal Anterior Cervical Nodes] : no anterior cervical lymphadenopathy [Grossly Normal Strength/Tone] : grossly normal strength/tone [No Joint Swelling] : no joint swelling [Coordination Grossly Intact] : coordination grossly intact [No Rash] : no rash [No Focal Deficits] : no focal deficits [Normal Gait] : normal gait [Normal Affect] : the affect was normal [Deep Tendon Reflexes (DTR)] : deep tendon reflexes were 2+ and symmetric [de-identified] : left knee wound with surrounding erythema and extension of erythema down shin [Normal Insight/Judgement] : insight and judgment were intact

## 2020-06-26 NOTE — PLAN
[FreeTextEntry1] : -start clinda for better strep coverage, unsure of allergy to PCN and mentioned being told something about 'breathing problems' when she took it as a child so will avoid cephalosporins\par -educated on risk of diarrhea/c diff, advise take with OTC probiotic \par -return if no improvement in 48-72 hours \par -check x-ray to r/o fx and/or osteo

## 2020-07-05 NOTE — REVIEW OF SYSTEMS
[Recent Weight Loss (___ Lbs)] : recent [unfilled] ~Ulb weight loss [Dysphagia] : dysphagia [Joint Pain] : joint pain [Anxiety] : anxiety [Headache] : headaches [All other systems negative] : All other systems negative [Fatigue] : no fatigue [Recent Weight Gain (___ Lbs)] : no recent weight gain [Visual Field Defect] : no visual field defect [Blurry Vision] : no blurred vision [Dysphonia] : no dysphonia [Chest Pain] : no chest pain [Polyuria] : no polyuria [Palpitations] : no palpitations [Cushing's Stigmata] : no Cushing's stigmata [Polydipsia] : no polydipsia [FreeTextEntry2] : difficulty losing weight, lost 13 lb. on Farideh Harley [FreeTextEntry8] : Menopause in 2004 [FreeTextEntry9] : knee pain [FreeTextEntry4] : H [de-identified] : sinus headaches 3

## 2020-07-10 ENCOUNTER — RX RENEWAL (OUTPATIENT)
Age: 70
End: 2020-07-10

## 2020-07-13 ENCOUNTER — RX RENEWAL (OUTPATIENT)
Age: 70
End: 2020-07-13

## 2020-09-14 ENCOUNTER — RX RENEWAL (OUTPATIENT)
Age: 70
End: 2020-09-14

## 2020-10-05 ENCOUNTER — RX RENEWAL (OUTPATIENT)
Age: 70
End: 2020-10-05

## 2020-10-12 ENCOUNTER — APPOINTMENT (OUTPATIENT)
Dept: ENDOCRINOLOGY | Facility: CLINIC | Age: 70
End: 2020-10-12

## 2020-10-21 ENCOUNTER — APPOINTMENT (OUTPATIENT)
Dept: INTERNAL MEDICINE | Facility: CLINIC | Age: 70
End: 2020-10-21
Payer: COMMERCIAL

## 2020-10-21 PROCEDURE — 99213 OFFICE O/P EST LOW 20 MIN: CPT | Mod: 95

## 2020-10-21 NOTE — H&P ADULT. - NS MD HP PULSE RADIAL
October 21, 2020        Alecia Goodman PA-C  [address]  [place] [address]             [facility]  [address]  [place] [address]  Phone: [phone]  Fax: [phone]   Patient: Dary Chaney   MR Number: [id number]   YOB: 1954   Date of Visit: 10/21/2020       Dear Dr. Goodman:    Thank you for referring Dary Chaney to me for evaluation. Below are the relevant portions of my assessment and plan of care.     If you have questions, please do not hesitate to call me. I look forward to following Dary along with you.    Sincerely,      Helena Phillips RD, CDE           CC  Sakina Cooper, DO        left normal/right normal

## 2020-10-21 NOTE — HISTORY OF PRESENT ILLNESS
[Home] : at home, [unfilled] , at the time of the visit. [Medical Office: ([facility])___] : at the medical office located in  [Verbal consent obtained from patient] : the patient, [unfilled] [FreeTextEntry8] : cc: uti\par \par c/o urinary burning and then itching after urinating, urgency, frequency and small amounts for 2 weeks.

## 2020-10-27 ENCOUNTER — APPOINTMENT (OUTPATIENT)
Dept: CARDIOLOGY | Facility: CLINIC | Age: 70
End: 2020-10-27

## 2020-11-27 ENCOUNTER — RX RENEWAL (OUTPATIENT)
Age: 70
End: 2020-11-27

## 2020-12-11 ENCOUNTER — NON-APPOINTMENT (OUTPATIENT)
Age: 70
End: 2020-12-11

## 2020-12-11 ENCOUNTER — APPOINTMENT (OUTPATIENT)
Dept: CARDIOLOGY | Facility: CLINIC | Age: 70
End: 2020-12-11
Payer: COMMERCIAL

## 2020-12-11 VITALS
SYSTOLIC BLOOD PRESSURE: 177 MMHG | BODY MASS INDEX: 47.66 KG/M2 | OXYGEN SATURATION: 97 % | HEIGHT: 62 IN | HEART RATE: 82 BPM | WEIGHT: 259 LBS | DIASTOLIC BLOOD PRESSURE: 84 MMHG

## 2020-12-11 VITALS — SYSTOLIC BLOOD PRESSURE: 162 MMHG | DIASTOLIC BLOOD PRESSURE: 76 MMHG

## 2020-12-11 PROCEDURE — 99214 OFFICE O/P EST MOD 30 MIN: CPT

## 2020-12-11 PROCEDURE — 93000 ELECTROCARDIOGRAM COMPLETE: CPT

## 2020-12-11 PROCEDURE — 99072 ADDL SUPL MATRL&STAF TM PHE: CPT

## 2020-12-11 NOTE — DISCUSSION/SUMMARY
[FreeTextEntry1] : 70 year woman with a history as listed presents for a followup cardiac evaluation. \par \par Karla is having worsening back pain. She will try PT. It is likely exacerbated by her weight gain and anxiety. \par She denies any anginal symptoms. Clinically she is euvolemic on exam. Her EKG is unchanged from previous.  Her conduction disease has likely progressed from her hypertension. She will get a Holter to rule out arrhythmias but wants to wait till spring to do it. She had an nuclear stress test unrevealing for ischemia on 6/28/19.\par She had an echo in 3/2019 that showed normal systolic LV function with mild LVH without any significant other findings, including no significant valvular disease. \par \par her blood pressure is uncontrolled. \par She will continue her Coreg 25mg q12 and  Enalapril 20mg Q12.  She will continue Hydralazine 100mg Q8.  \par  She did not tolerate the Aldactone, or Clonidine. She did not tolerates CCBs secondary to lower extremity edema.  She is only on HCTZ as PRN for lower extremity edema (historically becomes hypoNa). Given her brisk BP reduction to Regadenoson I think another vasodilator would help. \par She tolerated the Doxazosin. I will increase 2mg q12. \par \par  Her MRI reveal infarcts in multiple vascular territories. I have offered her a ILR implantation. She states that she will think about it still. She is not convinced. She wants to hold off for now.  \par \par She will continue with ASA 81mg Qday. \par \par Exercise and diet counseling was performed in order to reduce her future cardiovascular risk. She will followup with me in 4-5 months for a BP check or sooner if necessary. \par KARLA will followup with you for all of her other medical needs.

## 2020-12-11 NOTE — HISTORY OF PRESENT ILLNESS
[FreeTextEntry1] : 70 year old woman with a history of obesity, hypertension, fibromyalgia. She presented to  with hypertensive emergency with a CVA. Her MRI was positive for CVA in different distributions. \par \par She presents for a followup. \par Since her last visit, she has been quarantined at home because of the COVID pandemic. \par She has been more anxious and stressed. She denies any blurry vision, headaches or recent stroke like symptoms. She is having significant back pain. \par She  denies any chest pain, PND, orthopnea, lower extremity edema, near syncope, syncope, strokelike symptoms. She has been more sedentary recently. She is complaint with her medications.  \par  \par  \par  \par \par  \par \par

## 2020-12-16 ENCOUNTER — APPOINTMENT (OUTPATIENT)
Dept: INTERNAL MEDICINE | Facility: CLINIC | Age: 70
End: 2020-12-16
Payer: COMMERCIAL

## 2020-12-16 DIAGNOSIS — M54.5 LOW BACK PAIN: ICD-10-CM

## 2020-12-16 PROBLEM — Z87.09 HISTORY OF SORE THROAT: Status: RESOLVED | Noted: 2018-01-20 | Resolved: 2020-12-16

## 2020-12-16 PROCEDURE — 99442: CPT

## 2020-12-16 NOTE — HISTORY OF PRESENT ILLNESS
[Home] : at home, [unfilled] , at the time of the visit. [Medical Office: ([facility])___] : at the medical office located in  [Verbal consent obtained from patient] : the patient, [unfilled] [FreeTextEntry8] : cc: back pain\par \par c/o years of back pain which is worse since being couped up at home. Also requests flexeril which she has from 2015 and has helped in the past. Xanax helped with her back pain. She would like P.T. for her back pain too. \par Working from home. Has a lot of anxiety. \par

## 2021-01-08 ENCOUNTER — NON-APPOINTMENT (OUTPATIENT)
Age: 71
End: 2021-01-08

## 2021-01-16 LAB
25(OH)D3 SERPL-MCNC: 51.5 NG/ML
ALBUMIN SERPL ELPH-MCNC: 4.3 G/DL
ALP BLD-CCNC: 95 U/L
ALT SERPL-CCNC: 16 U/L
ANION GAP SERPL CALC-SCNC: 14 MMOL/L
AST SERPL-CCNC: 14 U/L
BASOPHILS # BLD AUTO: 0.05 K/UL
BASOPHILS NFR BLD AUTO: 0.8 %
BILIRUB SERPL-MCNC: 0.3 MG/DL
BUN SERPL-MCNC: 13 MG/DL
CALCIUM SERPL-MCNC: 9.3 MG/DL
CHLORIDE SERPL-SCNC: 99 MMOL/L
CHOLEST SERPL-MCNC: 127 MG/DL
CO2 SERPL-SCNC: 22 MMOL/L
CREAT SERPL-MCNC: 0.88 MG/DL
EOSINOPHIL # BLD AUTO: 0.17 K/UL
EOSINOPHIL NFR BLD AUTO: 2.7 %
ESTIMATED AVERAGE GLUCOSE: 148 MG/DL
FOLATE SERPL-MCNC: >20 NG/ML
GLUCOSE SERPL-MCNC: 145 MG/DL
HBA1C MFR BLD HPLC: 6.8 %
HCT VFR BLD CALC: 33.2 %
HDLC SERPL-MCNC: 61 MG/DL
HGB BLD-MCNC: 10.3 G/DL
IMM GRANULOCYTES NFR BLD AUTO: 0.3 %
LDLC SERPL CALC-MCNC: 48 MG/DL
LYMPHOCYTES # BLD AUTO: 1.19 K/UL
LYMPHOCYTES NFR BLD AUTO: 19.1 %
MAN DIFF?: NORMAL
MCHC RBC-ENTMCNC: 26.5 PG
MCHC RBC-ENTMCNC: 31 GM/DL
MCV RBC AUTO: 85.6 FL
MONOCYTES # BLD AUTO: 0.55 K/UL
MONOCYTES NFR BLD AUTO: 8.8 %
NEUTROPHILS # BLD AUTO: 4.25 K/UL
NEUTROPHILS NFR BLD AUTO: 68.3 %
NONHDLC SERPL-MCNC: 66 MG/DL
PLATELET # BLD AUTO: 265 K/UL
POTASSIUM SERPL-SCNC: 4.4 MMOL/L
PROT SERPL-MCNC: 6.6 G/DL
RBC # BLD: 3.88 M/UL
RBC # FLD: 14.1 %
SODIUM SERPL-SCNC: 135 MMOL/L
TRIGL SERPL-MCNC: 89 MG/DL
TSH SERPL-ACNC: 0.55 UIU/ML
URATE SERPL-MCNC: 4.5 MG/DL
VIT B12 SERPL-MCNC: 468 PG/ML
WBC # FLD AUTO: 6.23 K/UL

## 2021-01-18 ENCOUNTER — APPOINTMENT (OUTPATIENT)
Dept: INTERNAL MEDICINE | Facility: CLINIC | Age: 71
End: 2021-01-18
Payer: COMMERCIAL

## 2021-01-18 ENCOUNTER — TRANSCRIPTION ENCOUNTER (OUTPATIENT)
Age: 71
End: 2021-01-18

## 2021-01-18 VITALS
HEART RATE: 82 BPM | DIASTOLIC BLOOD PRESSURE: 84 MMHG | SYSTOLIC BLOOD PRESSURE: 136 MMHG | TEMPERATURE: 97.8 F | RESPIRATION RATE: 14 BRPM | OXYGEN SATURATION: 97 % | BODY MASS INDEX: 47.66 KG/M2 | WEIGHT: 259 LBS | HEIGHT: 62 IN

## 2021-01-18 PROCEDURE — 99214 OFFICE O/P EST MOD 30 MIN: CPT

## 2021-01-18 PROCEDURE — 99072 ADDL SUPL MATRL&STAF TM PHE: CPT

## 2021-01-18 NOTE — HISTORY OF PRESENT ILLNESS
[FreeTextEntry8] : cc: sinus congestion\par \par c/o sinus congestion. She walked into bathroom yesterday and room started spinning. She feels unsteady and tremors. She took an antivert last at 2 pm today and 6 am prior. It is helping her vertigo, but still feels unsteady. Also has tightness in back of head and into scalp to forehead. Scalp is all tingly. \par \par She was anemic on recent blood work. Colonoscopy was in 2019. She saw Dr Marroquin over the summer. \par Gets a little blood when she blows her nose. Occasional hemorrhoid bleeding which is rare. No other bleeding. \par \par Gets very cold everyday.

## 2021-01-18 NOTE — PHYSICAL EXAM
[Normal Oropharynx] : the oropharynx was normal [No Lymphadenopathy] : no lymphadenopathy [Supple] : supple [Normal] : affect was normal and insight and judgment were intact [de-identified] : pain with rom of neck posterior [de-identified] : cerumen in ear canals

## 2021-01-19 ENCOUNTER — NON-APPOINTMENT (OUTPATIENT)
Age: 71
End: 2021-01-19

## 2021-01-19 LAB
ALBUMIN SERPL ELPH-MCNC: 4.6 G/DL
ALP BLD-CCNC: 93 U/L
ALT SERPL-CCNC: 18 U/L
ANION GAP SERPL CALC-SCNC: 14 MMOL/L
AST SERPL-CCNC: 14 U/L
BASOPHILS # BLD AUTO: 0.06 K/UL
BASOPHILS NFR BLD AUTO: 0.8 %
BILIRUB SERPL-MCNC: 0.3 MG/DL
BUN SERPL-MCNC: 12 MG/DL
CALCIUM SERPL-MCNC: 9.7 MG/DL
CHLORIDE SERPL-SCNC: 95 MMOL/L
CO2 SERPL-SCNC: 23 MMOL/L
CREAT SERPL-MCNC: 0.89 MG/DL
CRP SERPL-MCNC: 0.22 MG/DL
EOSINOPHIL # BLD AUTO: 0.1 K/UL
EOSINOPHIL NFR BLD AUTO: 1.4 %
ERYTHROCYTE [SEDIMENTATION RATE] IN BLOOD BY WESTERGREN METHOD: 28 MM/HR
FERRITIN SERPL-MCNC: 13 NG/ML
FOLATE SERPL-MCNC: >20 NG/ML
GLUCOSE SERPL-MCNC: 152 MG/DL
HCT VFR BLD CALC: 34 %
HGB BLD-MCNC: 10.5 G/DL
IMM GRANULOCYTES NFR BLD AUTO: 0.3 %
IRON SATN MFR SERPL: 10 %
IRON SERPL-MCNC: 51 UG/DL
LYMPHOCYTES # BLD AUTO: 1.04 K/UL
LYMPHOCYTES NFR BLD AUTO: 14.2 %
MAN DIFF?: NORMAL
MCHC RBC-ENTMCNC: 26.3 PG
MCHC RBC-ENTMCNC: 30.9 GM/DL
MCV RBC AUTO: 85.2 FL
MONOCYTES # BLD AUTO: 0.55 K/UL
MONOCYTES NFR BLD AUTO: 7.5 %
NEUTROPHILS # BLD AUTO: 5.57 K/UL
NEUTROPHILS NFR BLD AUTO: 75.8 %
PLATELET # BLD AUTO: 278 K/UL
POTASSIUM SERPL-SCNC: 4.7 MMOL/L
PROT SERPL-MCNC: 6.9 G/DL
RBC # BLD: 3.99 M/UL
RBC # BLD: 3.99 M/UL
RBC # FLD: 14 %
RETICS # AUTO: 2 %
RETICS AGGREG/RBC NFR: 79.8 K/UL
SODIUM SERPL-SCNC: 132 MMOL/L
TIBC SERPL-MCNC: 486 UG/DL
UIBC SERPL-MCNC: 435 UG/DL
VIT B12 SERPL-MCNC: 472 PG/ML
WBC # FLD AUTO: 7.34 K/UL

## 2021-01-25 ENCOUNTER — APPOINTMENT (OUTPATIENT)
Dept: ENDOCRINOLOGY | Facility: CLINIC | Age: 71
End: 2021-01-25
Payer: COMMERCIAL

## 2021-01-25 VITALS
BODY MASS INDEX: 47.66 KG/M2 | OXYGEN SATURATION: 98 % | WEIGHT: 259 LBS | HEART RATE: 82 BPM | HEIGHT: 62 IN | TEMPERATURE: 97.7 F

## 2021-01-25 PROCEDURE — 99072 ADDL SUPL MATRL&STAF TM PHE: CPT

## 2021-01-25 PROCEDURE — 99214 OFFICE O/P EST MOD 30 MIN: CPT

## 2021-01-25 NOTE — PHYSICAL EXAM
[Alert] : alert [Well Nourished] : well nourished [Obese] : obese [No Acute Distress] : no acute distress [No Proptosis] : no proptosis [No Respiratory Distress] : no respiratory distress [No Accessory Muscle Use] : no accessory muscle use [Normal Rate and Effort] : normal respiratory rate and effort [Clear to Auscultation] : lungs were clear to auscultation bilaterally [Normal S1, S2] : normal S1 and S2 [Normal Rate] : heart rate was normal [Regular Rhythm] : with a regular rhythm [No Edema] : no peripheral edema [Normal Bowel Sounds] : normal bowel sounds [Not Tender] : non-tender [Not Distended] : not distended [Soft] : abdomen soft [No Involuntary Movements] : no involuntary movements were seen [Acanthosis Nigricans] : no acanthosis nigricans [Oriented x3] : oriented to person, place, and time [Normal Affect] : the affect was normal [Normal Mood] : the mood was normal [de-identified] : unable to fully assess due to body habitus [de-identified] : +cushingoid appearance

## 2021-01-25 NOTE — REVIEW OF SYSTEMS
[Fatigue] : fatigue [Recent Weight Gain (___ Lbs)] : recent weight gain: [unfilled] lbs [Blurred Vision] : blurred vision [Dysphagia] : no dysphagia [Dysphonia] : no dysphonia [Chest Pain] : no chest pain [Palpitations] : no palpitations [SOB on Exertion] : shortness of breath on exertion [Nausea] : no nausea [Vomiting] : no vomiting [Polyuria] : no polyuria [Joint Pain] : joint pain [Anxiety] : anxiety [All other systems negative] : All other systems negative [FreeTextEntry2] : +difficulty losing weight [FreeTextEntry8] : +Menopausal

## 2021-01-25 NOTE — QUALITY MEASURES
[Nephrology Follow-Up] : patient is currently receiving treatment via nephrology follow-up [NoLowerExtremityNeuroExam] : pt. deferred

## 2021-01-25 NOTE — DATA REVIEWED
[FreeTextEntry1] : Labs 1/2021:\par CMP normal except sodium of 132 and glucose of 152\par TSH 0.55\par Lipid Profile normal\par A1c 6.8%\par Vit D 51.5\par \par Labs 1/14/20:\par A1c 6.7%\par \par Labs 6/19/19:\par A1c 6.6%\par \par Labs 5/2019:\par TSH 0.94\par Free T4 1.5\par LDL 44\par HDL 55\par Chol 123\par Trig 119\par \par Labs 9/2018:\par A1c 6.9%\par TSH 0.91\par Free T4 1.7\par Micro/Cr neg.\par LDL 41\par HDL 57\par Chol 124\par Trig 131\par CMP normal except glucose of 126\par Vit D 43\par \par Labs 1/2018:\par A1c 7.4%\par Micro/Cr 21\par Cortisol 7.5\par ACTH 30\par \par 12/2017:\par CBC normal\par CMP normal except glucose of 143\par \par Labs 11/2017:\par LDL 57\par HDL 60\par Chol 141\par Trig 120\par \par Labs 5/1/17:\par A1c 7.2%\par CBC normal\par CMP normal except glucose of 142\par TSH 1.30\par Free T4 1.5\par Vit D 43.1\par \par Labs 7/23/16:\par CBC normal\par CMP normal except glucose of 155\par \par HDL 41\par Chol 208\par Trig 281\par A1c 7.7%\par TSH 1.52\par Free T4 1.4\par ACTH 69\par Cortisol 14.3\par \par Labs 9/19/15:\par ACTH 46\par DHEAS 23.3\par Cortisol 13.4\par \par HDL 45\par Chol 192\par Trig 203\par CMP normal except glucose of 141\par CBC normal\par A1c 7.1%\par ESR 30\par \par MRI 5/2015: No evidence of pituitary adenoma\par \par Labs 1/2015:\par TSH 0.29\par Free T4 1.6\par AM Cortisol 1.4\par FSH 21\par LH 7.0\par Prolactin 11.5\par IGF-1 138\par \par DXA 8/2014: Normal\par

## 2021-01-25 NOTE — ASSESSMENT
[Carbohydrate Consistent Diet] : carbohydrate consistent diet [Long Term Vascular Complications] : long term vascular complications of diabetes [Importance of Diet and Exercise] : importance of diet and exercise to improve glycemic control, achieve weight loss and improve cardiovascular health [Self Monitoring of Blood Glucose] : self monitoring of blood glucose [Retinopathy Screening] : Patient was referred to ophthalmology for retinopathy screening [FreeTextEntry1] : 69 y/o F w/\par \par 1. 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 This appears to now be resolved with hx of nondiagnostic MRI and off prednisone, but clinically appeared to have iatrogenic Cushing's from long term high dose steroids now off steroids. Also now with recurrent symptoms similar to when she was first diagnosed with hypophysitis. Can repeat MRI to asses for changes. Self-lowered levothyroxine was on 50 mcg twice a week clinically euthyroid. Recommended discontinue now back on it. Doubt symptoms are related to hypothyroidism based on TFTs. Negative screen with midnight salivary cortisol level. Normal DXA 8/2014. Awaiting repeat DXA (script given by PCP). No need for treatment for osteoporosis at this time.\par \par 2. Type 2 DM-  A1c now off steroids A1c 6.8%. Goal A1c <7%. Continue with metformin to 2000 mg daily with dietary and lifestyle modifications as patient plans to exercise more. If >7% may consider adding GLP-1 analog for additional benefit of weight loss.\par \par 3. HTN- BP at goal, c/w current regimen of enalapril, hydralazine, and coreg. \par \par 4. Hyperlipidemia- c/w statin

## 2021-01-25 NOTE — HISTORY OF PRESENT ILLNESS
[FreeTextEntry1] : 69 y/o F diagnosed with pre-diabetes in 2010, made some dietary and lifestyle changes. Started to see Dr. Annie Hutson started on metformin 1000 mg daily in 2011 with improvement in A1c and weight loss. Plan was to taper off metformin. In 2012 started to feel "sick" had diarrhea, nausea and vomiting, persistent hyponatremia with multiple admission. Had generalized pain and muscle weakness. Started to develop cold intolerance (when she typically was warm). Had thyroid function tests which were consistent with secondary hypothyroidism. Due to persistent symptoms was admitted to [facility] found to have low cortisol. 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 Started on prednisone 40 mg daily 4/2014 tapered until 8/2015. Felt better initially on steroids. Glucose values started to increase. She was started on Lantus and Humalog. Gained 35 lb. on the steroids still difficulty losing weight. Diagnosed with venous insufficiency recently by vascular. Last cortisol level was 7 from 13 off prednisone. Seen initially by me 11/2015 recommended repeat MRI which was nondiagnostic. Screened for Cushing's given cushingoid features off steroids and elevated ACTH level which was normal. Was on levothyroxine 50 mcg daily. Chemically euthyroid as of 1/2021. Had stopped Synthroid in 3/2019 until 5/2019 when she noticed increased fatigue and difficulty losing weight. +occasional polydipsia at night without nocturia or polyuria. Has Accu-Chek glucometer but does not check glucose. No known hypoglycemia. No known complications from diabetes. A1c 6.8%. Tries to monitor carbs. +occasional fatigue with lack of energy, "legs feel tired", no nausea or vomiting, has diarrhea occasionally now improved after starting statin. Last optho 2019. Had received steroid injections 5/2016 for trigger finger no recent steroids. Micro/Cr neg. (9/2018).

## 2021-01-30 ENCOUNTER — APPOINTMENT (OUTPATIENT)
Dept: INTERNAL MEDICINE | Facility: CLINIC | Age: 71
End: 2021-01-30
Payer: COMMERCIAL

## 2021-01-30 PROCEDURE — 99442: CPT

## 2021-01-30 NOTE — HISTORY OF PRESENT ILLNESS
[Home] : at home, [unfilled] , at the time of the visit. [Medical Office: ([facility])___] : at the medical office located in  [Verbal consent obtained from patient] : the patient, [unfilled] [FreeTextEntry8] : Pt had ENT appt and wax removed, but continues to have vertigo.

## 2021-02-01 NOTE — HISTORY OF PRESENT ILLNESS
[Home] : at home, [unfilled] , at the time of the visit. [Medical Office: ([facility])___] : at the medical office located in  [Verbal consent obtained from patient] : the patient, [unfilled] [FreeTextEntry8] : cc: vertigo\par \par Pt had wax removed by ENT. Did not improve her vertigo. Had ENG and found to have crystals in left ear. Advised to go for balance therapy. Has anemia and hyponatremia. Discussed with endocrinology. \par Would like to put in for FMLA. Wants to stay out at least until 3/8/21. Leave actually started 1/18. She will bring in paperwork.

## 2021-02-05 ENCOUNTER — NON-APPOINTMENT (OUTPATIENT)
Age: 71
End: 2021-02-05

## 2021-02-11 ENCOUNTER — NON-APPOINTMENT (OUTPATIENT)
Age: 71
End: 2021-02-11

## 2021-03-22 ENCOUNTER — APPOINTMENT (OUTPATIENT)
Dept: INTERNAL MEDICINE | Facility: CLINIC | Age: 71
End: 2021-03-22
Payer: COMMERCIAL

## 2021-03-22 VITALS
RESPIRATION RATE: 14 BRPM | BODY MASS INDEX: 47.66 KG/M2 | SYSTOLIC BLOOD PRESSURE: 132 MMHG | WEIGHT: 259 LBS | HEART RATE: 88 BPM | TEMPERATURE: 98.2 F | HEIGHT: 62 IN | DIASTOLIC BLOOD PRESSURE: 84 MMHG | OXYGEN SATURATION: 98 %

## 2021-03-22 PROCEDURE — 99072 ADDL SUPL MATRL&STAF TM PHE: CPT

## 2021-03-22 PROCEDURE — 99214 OFFICE O/P EST MOD 30 MIN: CPT

## 2021-03-22 NOTE — PHYSICAL EXAM
[Normal] : affect was normal and insight and judgment were intact [de-identified] : sitting in chair [de-identified] : uses a walker to walk

## 2021-03-22 NOTE — REVIEW OF SYSTEMS
[Back Pain] : back pain [Dizziness] : dizziness [Unsteady Walking] : ataxia [Negative] : Psychiatric

## 2021-03-22 NOTE — HISTORY OF PRESENT ILLNESS
[FreeTextEntry1] : dizziness follow up [de-identified] : Pt is going for vestibular therapy and dx with a right eye problem. Has ophthalmology appt.\par Has back pain and difficult to exercise with her dizziness. \par She has unsteady walking and uses a walker. \par Takes a xanax at night and it helps her tension in her neck. \par She is scheduled for Pfizer 4/6/21 vaccine.

## 2021-03-23 LAB
ALBUMIN SERPL ELPH-MCNC: 4.2 G/DL
ALP BLD-CCNC: 93 U/L
ALT SERPL-CCNC: 18 U/L
ANION GAP SERPL CALC-SCNC: 11 MMOL/L
AST SERPL-CCNC: 16 U/L
BASOPHILS # BLD AUTO: 0.07 K/UL
BASOPHILS NFR BLD AUTO: 1 %
BILIRUB SERPL-MCNC: 0.2 MG/DL
BUN SERPL-MCNC: 23 MG/DL
CALCIUM SERPL-MCNC: 9.3 MG/DL
CHLORIDE SERPL-SCNC: 98 MMOL/L
CO2 SERPL-SCNC: 25 MMOL/L
CREAT SERPL-MCNC: 0.93 MG/DL
EOSINOPHIL # BLD AUTO: 0.18 K/UL
EOSINOPHIL NFR BLD AUTO: 2.7 %
GLUCOSE SERPL-MCNC: 151 MG/DL
HCT VFR BLD CALC: 31.9 %
HGB BLD-MCNC: 10 G/DL
IMM GRANULOCYTES NFR BLD AUTO: 0.3 %
LYMPHOCYTES # BLD AUTO: 1.13 K/UL
LYMPHOCYTES NFR BLD AUTO: 16.7 %
MAN DIFF?: NORMAL
MCHC RBC-ENTMCNC: 26.4 PG
MCHC RBC-ENTMCNC: 31.3 GM/DL
MCV RBC AUTO: 84.2 FL
MONOCYTES # BLD AUTO: 0.5 K/UL
MONOCYTES NFR BLD AUTO: 7.4 %
NEUTROPHILS # BLD AUTO: 4.86 K/UL
NEUTROPHILS NFR BLD AUTO: 71.9 %
PLATELET # BLD AUTO: 246 K/UL
POTASSIUM SERPL-SCNC: 4.7 MMOL/L
PROT SERPL-MCNC: 6.4 G/DL
RBC # BLD: 3.79 M/UL
RBC # FLD: 14.7 %
SODIUM SERPL-SCNC: 135 MMOL/L
WBC # FLD AUTO: 6.76 K/UL

## 2021-03-25 ENCOUNTER — TRANSCRIPTION ENCOUNTER (OUTPATIENT)
Age: 71
End: 2021-03-25

## 2021-04-09 ENCOUNTER — APPOINTMENT (OUTPATIENT)
Dept: CARDIOLOGY | Facility: CLINIC | Age: 71
End: 2021-04-09
Payer: COMMERCIAL

## 2021-04-09 ENCOUNTER — NON-APPOINTMENT (OUTPATIENT)
Age: 71
End: 2021-04-09

## 2021-04-09 VITALS
HEART RATE: 76 BPM | SYSTOLIC BLOOD PRESSURE: 189 MMHG | HEIGHT: 62 IN | DIASTOLIC BLOOD PRESSURE: 88 MMHG | OXYGEN SATURATION: 98 %

## 2021-04-09 PROCEDURE — 99072 ADDL SUPL MATRL&STAF TM PHE: CPT

## 2021-04-09 PROCEDURE — 99213 OFFICE O/P EST LOW 20 MIN: CPT

## 2021-04-09 PROCEDURE — 93000 ELECTROCARDIOGRAM COMPLETE: CPT

## 2021-04-09 NOTE — PHYSICAL EXAM
[Well Groomed] : well groomed [General Appearance - In No Acute Distress] : no acute distress [Normal Conjunctiva] : the conjunctiva exhibited no abnormalities [Eyelids - No Xanthelasma] : the eyelids demonstrated no xanthelasmas [Normal Oral Mucosa] : normal oral mucosa [No Oral Pallor] : no oral pallor [No Oral Cyanosis] : no oral cyanosis [Normal Jugular Venous A Waves Present] : normal jugular venous A waves present [Normal Jugular Venous V Waves Present] : normal jugular venous V waves present [No Jugular Venous Hale A Waves] : no jugular venous hale A waves [Respiration, Rhythm And Depth] : normal respiratory rhythm and effort [Exaggerated Use Of Accessory Muscles For Inspiration] : no accessory muscle use [Auscultation Breath Sounds / Voice Sounds] : lungs were clear to auscultation bilaterally [Abdomen Soft] : soft [Abdomen Tenderness] : non-tender [Abdomen Mass (___ Cm)] : no abdominal mass palpated [Abnormal Walk] : normal gait [Gait - Sufficient For Exercise Testing] : the gait was sufficient for exercise testing [Nail Clubbing] : no clubbing of the fingernails [Cyanosis, Localized] : no localized cyanosis [Petechial Hemorrhages (___cm)] : no petechial hemorrhages [Skin Color & Pigmentation] : normal skin color and pigmentation [] : no rash [No Venous Stasis] : no venous stasis [Skin Lesions] : no skin lesions [No Skin Ulcers] : no skin ulcer [No Xanthoma] : no  xanthoma was observed [Oriented To Time, Place, And Person] : oriented to person, place, and time [Affect] : the affect was normal [Mood] : the mood was normal [No Anxiety] : not feeling anxious [Normal Rate] : normal [Rhythm Regular] : regular [Normal S1] : normal S1 [Normal S2] : normal S2 [Distant] : the heart sounds were distant [I] : a grade 1 [2+] : left 2+ [Right Carotid Bruit] : no bruit heard over the right carotid [Left Carotid Bruit] : no bruit heard over the left carotid [Bruit] : no bruit heard [No Pitting Edema] : no pitting edema present

## 2021-04-09 NOTE — REVIEW OF SYSTEMS
[Headache] : headache [see HPI] : see HPI [Dyspnea on exertion] : not dyspnea during exertion [Dizziness] : dizziness [Negative] : Heme/Lymph

## 2021-04-09 NOTE — DISCUSSION/SUMMARY
[FreeTextEntry1] : 70 year woman with a history as listed presents for a followup cardiac evaluation. \par \par Karla is now dealing with worsening anemia and vertigo. She will need to see neurology. May consider seeing Heme for IV Iron infusions.\par \par She denies any anginal symptoms. Clinically she is euvolemic on exam. Her EKG is unchanged from previous.  Her conduction disease has likely progressed from her hypertension. She will get a Holter to rule out arrhythmias but wants to wait till summer to do it. She had an nuclear stress test unrevealing for ischemia on 6/28/19. She had an echo in 3/2019 that showed normal systolic LV function with mild LVH without any significant other findings, including no significant valvular disease. \par \par her blood pressure is uncontrolled but improved. She will continue her Coreg 25mg q12 and  Enalapril 20mg Q12.  She will continue Hydralazine 100mg Q8.   She did not tolerate the Aldactone, or Clonidine. She did not tolerates CCBs secondary to lower extremity edema.  She is only on HCTZ as PRN for lower extremity edema (historically becomes hypoNa). Given her brisk BP reduction to Regadenoson I think another vasodilator would help. \par She tolerated the Doxazosin but had dizziness at 2mg q12 She will continue 1mg q12. \par \par  Her MRI reveal infarcts in multiple vascular territories. I have offered her a ILR implantation. She states that she will think about it still. She is not convinced. She wants to hold off for now.  \par \par She will continue with ASA 81mg Qday. \par \par Exercise and diet counseling was performed in order to reduce her future cardiovascular risk. She will followup with me in 2-3 months for a BP check or sooner if necessary. \par KARLA will followup with you for all of her other medical needs.

## 2021-04-09 NOTE — HISTORY OF PRESENT ILLNESS
[FreeTextEntry1] : 70 year old woman with a history of obesity, hypertension, fibromyalgia. She presented to  with hypertensive emergency with a CVA. Her MRI was positive for CVA in different distributions. \par \par She presents for a followup. \par Since her last visit, she has been suffering from vertigo. She needed vestibular therapy. She is now ambulating with a walker because of the gait imbalance. She has also noted to have worsening anemia. She had a colonoscopy that was unrevealing. \par \par She  denies any chest pain, PND, orthopnea, lower extremity edema, near syncope, syncope, strokelike symptoms. She has been more sedentary recently. She is complaint with her medications.  \par  \par  \par  \par \par  \par \par

## 2021-04-19 ENCOUNTER — RX RENEWAL (OUTPATIENT)
Age: 71
End: 2021-04-19

## 2021-04-19 ENCOUNTER — APPOINTMENT (OUTPATIENT)
Dept: INTERNAL MEDICINE | Facility: CLINIC | Age: 71
End: 2021-04-19
Payer: COMMERCIAL

## 2021-04-19 VITALS
RESPIRATION RATE: 14 BRPM | DIASTOLIC BLOOD PRESSURE: 82 MMHG | WEIGHT: 259 LBS | SYSTOLIC BLOOD PRESSURE: 138 MMHG | BODY MASS INDEX: 47.66 KG/M2 | OXYGEN SATURATION: 98 % | TEMPERATURE: 97.3 F | HEART RATE: 91 BPM | HEIGHT: 62 IN

## 2021-04-19 DIAGNOSIS — R42 DIZZINESS AND GIDDINESS: ICD-10-CM

## 2021-04-19 PROCEDURE — 99213 OFFICE O/P EST LOW 20 MIN: CPT

## 2021-04-19 PROCEDURE — 99072 ADDL SUPL MATRL&STAF TM PHE: CPT

## 2021-04-19 NOTE — REVIEW OF SYSTEMS
[Headache] : headache [Dizziness] : dizziness [Unsteady Walking] : ataxia [Anxiety] : anxiety [Negative] : Heme/Lymph

## 2021-04-19 NOTE — HISTORY OF PRESENT ILLNESS
[FreeTextEntry1] : dizziness [de-identified] : Pt is feeling about the same. She is going for vestibular P.T.  Today feels wavy and feels off. Has sinus congestion. Has allergy symptoms. Continues to not be able to work. \par She is taking iron and has a GI appt next week.

## 2021-04-20 DIAGNOSIS — E27.0 OTHER ADRENOCORTICAL OVERACTIVITY: ICD-10-CM

## 2021-04-20 LAB
ALBUMIN SERPL ELPH-MCNC: 4.2 G/DL
ALP BLD-CCNC: 102 U/L
ALT SERPL-CCNC: 16 U/L
ANION GAP SERPL CALC-SCNC: 13 MMOL/L
AST SERPL-CCNC: 16 U/L
BASOPHILS # BLD AUTO: 0.07 K/UL
BASOPHILS NFR BLD AUTO: 0.9 %
BILIRUB SERPL-MCNC: 0.2 MG/DL
BUN SERPL-MCNC: 21 MG/DL
CALCIUM SERPL-MCNC: 9.4 MG/DL
CHLORIDE SERPL-SCNC: 96 MMOL/L
CO2 SERPL-SCNC: 23 MMOL/L
CREAT SERPL-MCNC: 0.83 MG/DL
EOSINOPHIL # BLD AUTO: 0.21 K/UL
EOSINOPHIL NFR BLD AUTO: 2.6 %
ESTIMATED AVERAGE GLUCOSE: 160 MG/DL
FERRITIN SERPL-MCNC: 11 NG/ML
FOLATE SERPL-MCNC: 16.2 NG/ML
GLUCOSE SERPL-MCNC: 120 MG/DL
HBA1C MFR BLD HPLC: 7.2 %
HCT VFR BLD CALC: 33.8 %
HGB BLD-MCNC: 10.1 G/DL
IMM GRANULOCYTES NFR BLD AUTO: 0.4 %
IRON SATN MFR SERPL: 8 %
IRON SERPL-MCNC: 38 UG/DL
LYMPHOCYTES # BLD AUTO: 1.09 K/UL
LYMPHOCYTES NFR BLD AUTO: 13.4 %
MAN DIFF?: NORMAL
MCHC RBC-ENTMCNC: 25.8 PG
MCHC RBC-ENTMCNC: 29.9 GM/DL
MCV RBC AUTO: 86.2 FL
MONOCYTES # BLD AUTO: 0.66 K/UL
MONOCYTES NFR BLD AUTO: 8.1 %
NEUTROPHILS # BLD AUTO: 6.08 K/UL
NEUTROPHILS NFR BLD AUTO: 74.6 %
PLATELET # BLD AUTO: 293 K/UL
POTASSIUM SERPL-SCNC: 4.6 MMOL/L
PROT SERPL-MCNC: 6.4 G/DL
RBC # BLD: 3.92 M/UL
RBC # BLD: 3.92 M/UL
RBC # FLD: 15 %
RETICS # AUTO: 2.1 %
RETICS AGGREG/RBC NFR: 80.4 K/UL
SODIUM SERPL-SCNC: 132 MMOL/L
TIBC SERPL-MCNC: 483 UG/DL
UIBC SERPL-MCNC: 444 UG/DL
VIT B12 SERPL-MCNC: 545 PG/ML
WBC # FLD AUTO: 8.14 K/UL

## 2021-05-19 ENCOUNTER — APPOINTMENT (OUTPATIENT)
Dept: INTERNAL MEDICINE | Facility: CLINIC | Age: 71
End: 2021-05-19
Payer: COMMERCIAL

## 2021-05-19 VITALS
DIASTOLIC BLOOD PRESSURE: 84 MMHG | RESPIRATION RATE: 14 BRPM | OXYGEN SATURATION: 97 % | TEMPERATURE: 97.5 F | HEART RATE: 86 BPM | BODY MASS INDEX: 47.66 KG/M2 | SYSTOLIC BLOOD PRESSURE: 140 MMHG | WEIGHT: 259 LBS | HEIGHT: 62 IN

## 2021-05-19 DIAGNOSIS — E87.1 HYPO-OSMOLALITY AND HYPONATREMIA: ICD-10-CM

## 2021-05-19 PROCEDURE — 99072 ADDL SUPL MATRL&STAF TM PHE: CPT

## 2021-05-19 PROCEDURE — 99214 OFFICE O/P EST MOD 30 MIN: CPT

## 2021-05-19 RX ORDER — DOXAZOSIN 1 MG/1
1 TABLET ORAL TWICE DAILY
Qty: 180 | Refills: 3 | Status: DISCONTINUED | COMMUNITY
Start: 2019-07-11 | End: 2021-05-19

## 2021-05-19 RX ORDER — NITROFURANTOIN (MONOHYDRATE/MACROCRYSTALS) 25; 75 MG/1; MG/1
100 CAPSULE ORAL
Qty: 14 | Refills: 0 | Status: DISCONTINUED | COMMUNITY
Start: 2020-10-21 | End: 2021-05-19

## 2021-05-19 RX ORDER — DOXAZOSIN 2 MG/1
2 TABLET ORAL
Qty: 90 | Refills: 0 | Status: DISCONTINUED | COMMUNITY
Start: 2020-12-11 | End: 2021-05-19

## 2021-05-19 NOTE — HISTORY OF PRESENT ILLNESS
[FreeTextEntry1] : disability f/u [de-identified] : She saw Dr Marroquin b/o her anemia. He suggested that she see a hematologist. Had colonoscopy 4/5/2019 and only saw internal hemorrhoids. Trouble now is digestion and trouble swallowing pills sometimes. Had an endoscopy several years ago and needed esophagus stretched which may need to be done again. \par She called hematology, Dr Durbin, who also wants last 3 labs and office notes. \par She has been taking iron supplements.\par Stopped doxazosin because it was giving her headaches. Seeing Dr Alvarez 6/15. Will have holter and echo. \par She is upset that her handicap parking permit got rejected and wants me to refill it out.

## 2021-05-20 LAB
ALBUMIN SERPL ELPH-MCNC: 4.4 G/DL
ALP BLD-CCNC: 93 U/L
ALT SERPL-CCNC: 16 U/L
ANION GAP SERPL CALC-SCNC: 14 MMOL/L
AST SERPL-CCNC: 17 U/L
BASOPHILS # BLD AUTO: 0.06 K/UL
BASOPHILS NFR BLD AUTO: 0.7 %
BILIRUB SERPL-MCNC: 0.2 MG/DL
BUN SERPL-MCNC: 16 MG/DL
CALCIUM SERPL-MCNC: 9.8 MG/DL
CHLORIDE SERPL-SCNC: 97 MMOL/L
CO2 SERPL-SCNC: 23 MMOL/L
CREAT SERPL-MCNC: 0.82 MG/DL
EOSINOPHIL # BLD AUTO: 0.16 K/UL
EOSINOPHIL NFR BLD AUTO: 2 %
ESTIMATED AVERAGE GLUCOSE: 163 MG/DL
FERRITIN SERPL-MCNC: 12 NG/ML
GLUCOSE SERPL-MCNC: 191 MG/DL
HBA1C MFR BLD HPLC: 7.3 %
HCT VFR BLD CALC: 35.9 %
HGB BLD-MCNC: 10.8 G/DL
IMM GRANULOCYTES NFR BLD AUTO: 0.4 %
IRON SATN MFR SERPL: 13 %
IRON SERPL-MCNC: 58 UG/DL
LYMPHOCYTES # BLD AUTO: 0.89 K/UL
LYMPHOCYTES NFR BLD AUTO: 10.9 %
MAN DIFF?: NORMAL
MCHC RBC-ENTMCNC: 26 PG
MCHC RBC-ENTMCNC: 30.1 GM/DL
MCV RBC AUTO: 86.3 FL
MONOCYTES # BLD AUTO: 0.64 K/UL
MONOCYTES NFR BLD AUTO: 7.8 %
NEUTROPHILS # BLD AUTO: 6.42 K/UL
NEUTROPHILS NFR BLD AUTO: 78.2 %
PLATELET # BLD AUTO: 304 K/UL
POTASSIUM SERPL-SCNC: 4.7 MMOL/L
PROT SERPL-MCNC: 6.8 G/DL
RBC # BLD: 4.16 M/UL
RBC # FLD: 15 %
SODIUM SERPL-SCNC: 134 MMOL/L
TIBC SERPL-MCNC: 447 UG/DL
UIBC SERPL-MCNC: 389 UG/DL
WBC # FLD AUTO: 8.2 K/UL

## 2021-06-05 ENCOUNTER — RX RENEWAL (OUTPATIENT)
Age: 71
End: 2021-06-05

## 2021-06-07 ENCOUNTER — RX RENEWAL (OUTPATIENT)
Age: 71
End: 2021-06-07

## 2021-06-15 ENCOUNTER — APPOINTMENT (OUTPATIENT)
Dept: CARDIOLOGY | Facility: CLINIC | Age: 71
End: 2021-06-15
Payer: COMMERCIAL

## 2021-06-15 ENCOUNTER — NON-APPOINTMENT (OUTPATIENT)
Age: 71
End: 2021-06-15

## 2021-06-15 VITALS
SYSTOLIC BLOOD PRESSURE: 176 MMHG | DIASTOLIC BLOOD PRESSURE: 82 MMHG | OXYGEN SATURATION: 97 % | HEIGHT: 62 IN | WEIGHT: 253 LBS | HEART RATE: 75 BPM | BODY MASS INDEX: 46.56 KG/M2

## 2021-06-15 PROCEDURE — 99072 ADDL SUPL MATRL&STAF TM PHE: CPT

## 2021-06-15 PROCEDURE — 99214 OFFICE O/P EST MOD 30 MIN: CPT

## 2021-06-15 PROCEDURE — 93000 ELECTROCARDIOGRAM COMPLETE: CPT

## 2021-06-15 NOTE — PHYSICAL EXAM
[Well Groomed] : well groomed [General Appearance - In No Acute Distress] : no acute distress [Normal Conjunctiva] : the conjunctiva exhibited no abnormalities [Eyelids - No Xanthelasma] : the eyelids demonstrated no xanthelasmas [Normal Oral Mucosa] : normal oral mucosa [No Oral Pallor] : no oral pallor [No Oral Cyanosis] : no oral cyanosis [Normal Jugular Venous A Waves Present] : normal jugular venous A waves present [Normal Jugular Venous V Waves Present] : normal jugular venous V waves present [No Jugular Venous Hale A Waves] : no jugular venous hale A waves [Respiration, Rhythm And Depth] : normal respiratory rhythm and effort [Exaggerated Use Of Accessory Muscles For Inspiration] : no accessory muscle use [Auscultation Breath Sounds / Voice Sounds] : lungs were clear to auscultation bilaterally [Abdomen Soft] : soft [Abdomen Tenderness] : non-tender [Abdomen Mass (___ Cm)] : no abdominal mass palpated [Abnormal Walk] : normal gait [Gait - Sufficient For Exercise Testing] : the gait was sufficient for exercise testing [Nail Clubbing] : no clubbing of the fingernails [Cyanosis, Localized] : no localized cyanosis [Petechial Hemorrhages (___cm)] : no petechial hemorrhages [Skin Color & Pigmentation] : normal skin color and pigmentation [] : no rash [Skin Lesions] : no skin lesions [No Venous Stasis] : no venous stasis [No Skin Ulcers] : no skin ulcer [No Xanthoma] : no  xanthoma was observed [Oriented To Time, Place, And Person] : oriented to person, place, and time [Affect] : the affect was normal [Mood] : the mood was normal [No Anxiety] : not feeling anxious [Normal Rate] : normal [Rhythm Regular] : regular [Normal S1] : normal S1 [Normal S2] : normal S2 [Distant] : the heart sounds were distant [I] : a grade 1 [2+] : left 2+ [Right Carotid Bruit] : no bruit heard over the right carotid [Left Carotid Bruit] : no bruit heard over the left carotid [Bruit] : no bruit heard [No Pitting Edema] : no pitting edema present

## 2021-06-15 NOTE — HISTORY OF PRESENT ILLNESS
[FreeTextEntry1] : 70 year old woman with a history of obesity, hypertension, fibromyalgia. She presented to  with hypertensive emergency with a CVA. Her MRI was positive for CVA in different distributions. \par \par She presents for a followup. \par Since her last visit, she has been feeling better. Her vertigo has improved after she resumed her botox for cosmetic reasons. Though since then her vertigo has stopped. \par  She is ambulating better. She  denies any chest pain, PND, orthopnea, lower extremity edema, near syncope, syncope, strokelike symptoms. She has been more sedentary recently. She is complaint with her medications.  Thoguh she stopped taking the Doxazosin. \par  \par  \par  \par \par  \par \par

## 2021-06-15 NOTE — CARDIOLOGY SUMMARY
[de-identified] : 6/2019 pharm nuc neg nuc [de-identified] : 5/2019 normal systolic LV function with mild LVH

## 2021-06-15 NOTE — DISCUSSION/SUMMARY
[FreeTextEntry1] : 70 year woman with a history as listed presents for a followup cardiac evaluation. \par \par Dulce Maria is better sins her last visit. She denies any anginal symptoms. Clinically she is euvolemic on exam. Her EKG is unchanged from previous.  Her conduction disease has likely progressed from her hypertension. She wiill get a zio patch to assess for worsening disease.  She will undergo a pharmacological nuclear stress test to assess for underlying obstructive CAD. She will get a 2d echo to assess for any  new structural heart disease, changes in valvular and ventricular function. \par \par her blood pressure is uncontrolled. She will continue her Coreg 25mg q12 and  Enalapril 20mg Q12.  She will continue Hydralazine 100mg Q8.   She did not tolerate the Aldactone, Doxazosin. She did not tolerates CCBs secondary to lower extremity edema.  She is only on HCTZ as PRN for lower extremity edema (historically becomes hypoNa). Given her brisk BP reduction to Regadenoson I think another vasodilator would help. She is willing to try the clonidine 0.1mg qhs\par \par  Her MRI reveal infarcts in multiple vascular territories. I have offered her a ILR implantation. She states that she will think about it still. She is not convinced. She wants to hold off for now.  \par \par She will continue with ASA 81mg Qday. \par \par Exercise and diet counseling was performed in order to reduce her future cardiovascular risk. She will followup with me in  3 months for a BP check or sooner if necessary. \austin ACOSTA will followup with you for all of her other medical needs.

## 2021-06-15 NOTE — REVIEW OF SYSTEMS
[Feeling Fatigued] : feeling fatigued [Joint Pain] : joint pain [Joint Stiffness] : joint stiffness [Negative] : Heme/Lymph

## 2021-06-16 ENCOUNTER — APPOINTMENT (OUTPATIENT)
Dept: INTERNAL MEDICINE | Facility: CLINIC | Age: 71
End: 2021-06-16
Payer: COMMERCIAL

## 2021-06-16 VITALS
BODY MASS INDEX: 46.27 KG/M2 | HEART RATE: 79 BPM | WEIGHT: 253 LBS | TEMPERATURE: 97.4 F | RESPIRATION RATE: 16 BRPM | OXYGEN SATURATION: 97 % | SYSTOLIC BLOOD PRESSURE: 140 MMHG | DIASTOLIC BLOOD PRESSURE: 86 MMHG

## 2021-06-16 DIAGNOSIS — R42 DIZZINESS AND GIDDINESS: ICD-10-CM

## 2021-06-16 PROCEDURE — 99214 OFFICE O/P EST MOD 30 MIN: CPT

## 2021-06-16 PROCEDURE — 99072 ADDL SUPL MATRL&STAF TM PHE: CPT

## 2021-06-16 NOTE — HISTORY OF PRESENT ILLNESS
[FreeTextEntry1] : f/u pain and vertigo [de-identified] : Pt is here for f/u of anemia, fibromyalgia, chronic pain. P.T. is helping for her back and leg pain. \par \par She saw Dr Durbin who recommended some diet changes to increase iron. \par \par Needs bone density referral. \par \par Has chronic vertigo which has improved.

## 2021-07-12 ENCOUNTER — APPOINTMENT (OUTPATIENT)
Dept: CARDIOLOGY | Facility: CLINIC | Age: 71
End: 2021-07-12

## 2021-07-16 ENCOUNTER — MED ADMIN CHARGE (OUTPATIENT)
Age: 71
End: 2021-07-16

## 2021-07-16 ENCOUNTER — APPOINTMENT (OUTPATIENT)
Dept: CARDIOLOGY | Facility: CLINIC | Age: 71
End: 2021-07-16
Payer: COMMERCIAL

## 2021-07-16 ENCOUNTER — NON-APPOINTMENT (OUTPATIENT)
Age: 71
End: 2021-07-16

## 2021-07-16 VITALS
HEIGHT: 62 IN | BODY MASS INDEX: 46.19 KG/M2 | DIASTOLIC BLOOD PRESSURE: 101 MMHG | WEIGHT: 251 LBS | HEART RATE: 85 BPM | SYSTOLIC BLOOD PRESSURE: 190 MMHG

## 2021-07-16 PROCEDURE — 93306 TTE W/DOPPLER COMPLETE: CPT

## 2021-07-16 PROCEDURE — 93000 ELECTROCARDIOGRAM COMPLETE: CPT

## 2021-07-16 PROCEDURE — 99214 OFFICE O/P EST MOD 30 MIN: CPT

## 2021-07-16 PROCEDURE — 99072 ADDL SUPL MATRL&STAF TM PHE: CPT

## 2021-07-16 RX ORDER — PERFLUTREN 6.52 MG/ML
6.52 INJECTION, SUSPENSION INTRAVENOUS
Qty: 2 | Refills: 0 | Status: COMPLETED | OUTPATIENT
Start: 2021-07-16

## 2021-07-16 RX ADMIN — PERFLUTREN MG/ML: 6.52 INJECTION, SUSPENSION INTRAVENOUS at 00:00

## 2021-07-16 NOTE — DISCUSSION/SUMMARY
[FreeTextEntry1] : 70 year woman with a history as listed presents for a followup cardiac evaluation. \par \par Dulce Maria is better sins her last visit. She denies any anginal symptoms. Clinically she is euvolemic on exam.  Her conduction disease has likely progressed from her hypertension. Now that she has worsening HERNÁNDEZ she will need a pharmacological nuclear stress. \par \par She will get a zio patch to assess for worsening conduction disease.   \par her blood pressure is uncontrolled. She will continue her Coreg 25mg q12 and  Enalapril 20mg Q12.  She will continue Hydralazine 100mg Q8.   She did not tolerate the Aldactone. She did not tolerates CCBs secondary to lower extremity edema.  She is only on HCTZ as PRN for lower extremity edema (historically becomes hypoNa). Given her brisk BP reduction to Regadenoson I think another vasodilator would help.  She will increase her Doxazosin to 2mg HS. She will try to titrate it up to 4mg QHS\par \par  Her MRI reveal infarcts in multiple vascular territories. I have offered her a ILR implantation. She states that she will think about it still. She is not convinced. She wants to hold off for now.  \par \par She will continue with ASA 81mg Qday. \par \par Exercise and diet counseling was performed in order to reduce her future cardiovascular risk. She will followup with me in  3 months for a BP check or sooner if necessary. \austin ACOSTA will followup with you for all of her other medical needs.

## 2021-07-16 NOTE — HISTORY OF PRESENT ILLNESS
[FreeTextEntry1] : 70 year old woman with a history of obesity, hypertension, fibromyalgia. She presented to  with hypertensive emergency with a CVA. Her MRI was positive for CVA in different distributions. \par \par She presents for a followup. \par Since her last visit, she is having worsening right foot/leg pain. She complains of dyspnea on exertion. . She  denies any chest pain, PND, orthopnea, lower extremity edema, near syncope, syncope, strokelike symptoms. She has been more sedentary recently. She is complaint with her medications.  She was not able to tolerate clonidine 2/2 HA and started back on Doxazosin 1mg qhs. \par  \par  \par  \par \par  \par \par

## 2021-07-16 NOTE — CARDIOLOGY SUMMARY
[de-identified] : Sinus  Rhythm \par -Right bundle branch block with left axis -bifascicular block. \par  Voltage criteria for LVH  [de-identified] : 6/2019 pharm nuc neg nuc [de-identified] : 5/2019 normal systolic LV function with mild LVH

## 2021-07-19 ENCOUNTER — APPOINTMENT (OUTPATIENT)
Dept: INTERNAL MEDICINE | Facility: CLINIC | Age: 71
End: 2021-07-19
Payer: COMMERCIAL

## 2021-07-19 VITALS
RESPIRATION RATE: 14 BRPM | HEART RATE: 90 BPM | BODY MASS INDEX: 46.19 KG/M2 | HEIGHT: 62 IN | OXYGEN SATURATION: 96 % | WEIGHT: 251 LBS | DIASTOLIC BLOOD PRESSURE: 100 MMHG | SYSTOLIC BLOOD PRESSURE: 160 MMHG | TEMPERATURE: 97.5 F

## 2021-07-19 VITALS — SYSTOLIC BLOOD PRESSURE: 140 MMHG | DIASTOLIC BLOOD PRESSURE: 96 MMHG

## 2021-07-19 DIAGNOSIS — G89.29 OTHER CHRONIC PAIN: ICD-10-CM

## 2021-07-19 PROCEDURE — 99072 ADDL SUPL MATRL&STAF TM PHE: CPT

## 2021-07-19 PROCEDURE — 99214 OFFICE O/P EST MOD 30 MIN: CPT

## 2021-07-19 NOTE — HISTORY OF PRESENT ILLNESS
[de-identified] : Pt is here for f/u. Continues to have vertigo, lumbar stenosis, and leg pain. Has fibromyalgia and chronic pain. Her meds were adjusted. She c/o stress. Her dog has been sick.

## 2021-07-26 ENCOUNTER — TRANSCRIPTION ENCOUNTER (OUTPATIENT)
Age: 71
End: 2021-07-26

## 2021-07-27 ENCOUNTER — APPOINTMENT (OUTPATIENT)
Dept: ENDOCRINOLOGY | Facility: CLINIC | Age: 71
End: 2021-07-27

## 2021-09-10 NOTE — PHYSICAL THERAPY INITIAL EVALUATION ADULT - PERSONAL SAFETY AND JUDGMENT, REHAB EVAL
What Type Of Note Output Would You Prefer (Optional)?: Standard Output How Severe Is Your Acne?: moderate Is This A New Presentation, Or A Follow-Up?: Acne intact

## 2021-10-20 ENCOUNTER — NON-APPOINTMENT (OUTPATIENT)
Age: 71
End: 2021-10-20

## 2021-10-20 ENCOUNTER — APPOINTMENT (OUTPATIENT)
Dept: CARDIOLOGY | Facility: CLINIC | Age: 71
End: 2021-10-20
Payer: MEDICARE

## 2021-10-20 VITALS
WEIGHT: 250 LBS | OXYGEN SATURATION: 100 % | SYSTOLIC BLOOD PRESSURE: 145 MMHG | HEART RATE: 80 BPM | HEIGHT: 62 IN | BODY MASS INDEX: 46.01 KG/M2 | RESPIRATION RATE: 15 BRPM | DIASTOLIC BLOOD PRESSURE: 80 MMHG

## 2021-10-20 PROCEDURE — 99214 OFFICE O/P EST MOD 30 MIN: CPT

## 2021-10-20 PROCEDURE — 93000 ELECTROCARDIOGRAM COMPLETE: CPT

## 2021-10-20 RX ORDER — DOXAZOSIN 1 MG/1
1 TABLET ORAL DAILY
Qty: 90 | Refills: 3 | Status: DISCONTINUED | COMMUNITY
Start: 2021-08-18 | End: 2021-10-20

## 2021-10-20 RX ORDER — DOXAZOSIN 2 MG/1
2 TABLET ORAL
Qty: 90 | Refills: 3 | Status: DISCONTINUED | COMMUNITY
Start: 2021-07-19 | End: 2021-10-20

## 2021-10-20 NOTE — HISTORY OF PRESENT ILLNESS
[FreeTextEntry1] : 70 year old woman with a history of obesity, hypertension, fibromyalgia. She presented to  with hypertensive emergency with a CVA. Her MRI was positive for CVA in different distributions. \par \par She presents for a followup. \par Since her last visit, she is now s/p iron infusion. Her anemia is improving. She is in the processes of finding another GI doc. She still has HERNÁNDEZ.  She  denies any chest pain, PND, orthopnea, lower extremity edema, near syncope, syncope, strokelike symptoms. She has been more sedentary recently. She is complaint with her medications.   \par  \par  \par  \par \par  \par \par

## 2021-10-20 NOTE — CARDIOLOGY SUMMARY
[de-identified] : Sinus  Rhythm \par -Right bundle branch block with left axis -bifascicular block. \par  Voltage criteria for LVH  [de-identified] : 6/2019 pharm nuc neg nuc [de-identified] : 5/2019 normal systolic LV function with mild LVH\par 7/2021 Normal LV function. mild diastolic dysfunction.

## 2021-10-20 NOTE — DISCUSSION/SUMMARY
[FreeTextEntry1] : 70 year woman with a history as listed presents for a followup cardiac evaluation. \par \par Karla is better sins her last visit. She denies any anginal symptoms. Clinically she is euvolemic on exam.  Her conduction disease has likely progressed from her hypertension.  She will undergo a pharmacological nuclear stress test to assess for underlying obstructive CAD. She will get a zio patch to assess for worsening conduction disease.   \par her blood pressure is uncontrolled. She will continue her Coreg 25mg q12 and  Enalapril 20mg Q12.  She will continue Hydralazine 100mg Q8.   She did not tolerate the Aldactone. She did not tolerates CCBs secondary to lower extremity edema.  She is only on HCTZ as PRN for lower extremity edema (historically becomes hypoNa).  She did not tolerate the Doxazosin. She take Clonidine 0.1mg HS. \par \par  Her MRI reveal infarcts in multiple vascular territories. I have offered her a ILR implantation. She states that she will think about it still. She is not convinced. She wants to hold off for now.  \par \par She will continue with ASA 81mg Qday. \par \par Exercise and diet counseling was performed in order to reduce her future cardiovascular risk. She will followup with me in  3 months for a BP check or sooner if necessary. \par KARLA will followup with you for all of her other medical needs.

## 2021-10-26 ENCOUNTER — NON-APPOINTMENT (OUTPATIENT)
Age: 71
End: 2021-10-26

## 2021-11-08 ENCOUNTER — APPOINTMENT (OUTPATIENT)
Dept: CARDIOLOGY | Facility: CLINIC | Age: 71
End: 2021-11-08

## 2021-11-17 ENCOUNTER — NON-APPOINTMENT (OUTPATIENT)
Age: 71
End: 2021-11-17

## 2021-12-07 ENCOUNTER — NON-APPOINTMENT (OUTPATIENT)
Age: 71
End: 2021-12-07

## 2022-01-04 ENCOUNTER — NON-APPOINTMENT (OUTPATIENT)
Age: 72
End: 2022-01-04

## 2022-01-05 ENCOUNTER — APPOINTMENT (OUTPATIENT)
Dept: CARDIOLOGY | Facility: CLINIC | Age: 72
End: 2022-01-05

## 2022-01-18 ENCOUNTER — APPOINTMENT (OUTPATIENT)
Dept: CARDIOLOGY | Facility: CLINIC | Age: 72
End: 2022-01-18
Payer: COMMERCIAL

## 2022-01-18 ENCOUNTER — NON-APPOINTMENT (OUTPATIENT)
Age: 72
End: 2022-01-18

## 2022-01-18 VITALS — HEART RATE: 88 BPM | HEIGHT: 62 IN | OXYGEN SATURATION: 98 % | WEIGHT: 244 LBS | BODY MASS INDEX: 44.9 KG/M2

## 2022-01-18 VITALS — SYSTOLIC BLOOD PRESSURE: 150 MMHG | DIASTOLIC BLOOD PRESSURE: 82 MMHG

## 2022-01-18 PROCEDURE — 93000 ELECTROCARDIOGRAM COMPLETE: CPT

## 2022-01-18 PROCEDURE — 99214 OFFICE O/P EST MOD 30 MIN: CPT

## 2022-01-18 RX ORDER — CLONIDINE HYDROCHLORIDE 0.1 MG/1
0.1 TABLET ORAL
Qty: 90 | Refills: 3 | Status: DISCONTINUED | COMMUNITY
Start: 2021-06-15 | End: 2022-01-18

## 2022-01-18 NOTE — DISCUSSION/SUMMARY
[FreeTextEntry1] : 71 year woman with a history as listed presents for a followup cardiac evaluation. \par \par Karla is complaining of more palpitations. She will get a Zio Patch to rule out arrhythmias.\par Her conduction disease has likely progressed from her hypertension.  She will undergo a pharmacological nuclear stress test to assess for underlying obstructive CAD. \par \par her blood pressure is uncontrolled. She will continue her Coreg 25mg q12 and  Enalapril 20mg Q12.  She will continue Hydralazine 100mg Q8.   She did not tolerate the Aldactone. She did not tolerates CCBs secondary to lower extremity edema.  She is only on HCTZ as PRN for lower extremity edema (historically becomes hypoNa).  She did not tolerate the Doxazosin. I will try to switch her to a clonidine 0.1mg patch. \par \par Her MRI reveal infarcts in multiple vascular territories. I have offered her a ILR implantation. She states that she will think about it still. She is not convinced. She wants to hold off for now.  Will see what the Zio patch reveals. \par \par She will continue with ASA 81mg Qday. \par \par Exercise and diet counseling was performed in order to reduce her future cardiovascular risk. She will followup with me in  3 months for a BP check or sooner if necessary. \par KARLA will followup with you for all of her other medical needs.

## 2022-01-18 NOTE — HISTORY OF PRESENT ILLNESS
[FreeTextEntry1] : 70 year old woman with a history of obesity, hypertension, fibromyalgia. She presented to  with hypertensive emergency with a CVA. Her MRI was positive for CVA in different distributions. \par \par She presents for a followup. \par Since her last visit, she has been having more palpitations daily, last for minutes, self limiting, occurring randomly. It has been happening for last 3 weeks. \par \par She has recently saw a new GI doctor. She was recommended to take a probiotic and was told to decrease her PPI. She is still having swallowing issues. She is still getting iron infusions for her anemia. Her Hb has been stable. \par She has been getting more left arm numbness, associated with neck pain, nonexertional. self limiting. \par \par She  denies any chest pain, PND, orthopnea, lower extremity edema, near syncope, syncope, strokelike symptoms. She has been more sedentary given the cold weather. Her HERNÁNDEZ has remained stable. \par Medication reconciliation performed. She is complaint with her medications.   \par  \par  \par  \par \par  \par \par

## 2022-01-18 NOTE — REVIEW OF SYSTEMS
[Dyspnea on exertion] : not dyspnea during exertion [Joint Pain] : joint pain [Joint Stiffness] : joint stiffness [Numbness (Hypoesthesia)] : numbness [Negative] : Heme/Lymph

## 2022-01-18 NOTE — CARDIOLOGY SUMMARY
[de-identified] : Sinus  Rhythm \par -Right bundle branch block with left axis -bifascicular block. \par  Voltage criteria for LVH  [de-identified] : 6/2019 pharm nuc neg nuc [de-identified] : 5/2019 normal systolic LV function with mild LVH\par 7/2021 Normal LV function. mild diastolic dysfunction.

## 2022-02-01 ENCOUNTER — APPOINTMENT (OUTPATIENT)
Dept: CARDIOLOGY | Facility: CLINIC | Age: 72
End: 2022-02-01
Payer: COMMERCIAL

## 2022-02-01 PROCEDURE — A9500: CPT

## 2022-02-01 PROCEDURE — 78452 HT MUSCLE IMAGE SPECT MULT: CPT

## 2022-02-01 PROCEDURE — 93015 CV STRESS TEST SUPVJ I&R: CPT

## 2022-02-04 ENCOUNTER — TRANSCRIPTION ENCOUNTER (OUTPATIENT)
Age: 72
End: 2022-02-04

## 2022-03-02 NOTE — PATIENT PROFILE ADULT. - PRO ANTICIPATED DISCH DISP
PAT phone interview completed. Patient made aware to be NPO. Patient stated she had two doses of COVID vaccine. Patient given fax number to fax proof of COVID vaccine. Patient made aware not to get COVID test. Patient stated she has a ride for discharge and someone to stay with her for 24 hours after procedure. Patient stated she does not have a DNR order. home

## 2022-05-31 ENCOUNTER — RX RENEWAL (OUTPATIENT)
Age: 72
End: 2022-05-31

## 2022-06-06 ENCOUNTER — NON-APPOINTMENT (OUTPATIENT)
Age: 72
End: 2022-06-06

## 2022-06-06 ENCOUNTER — APPOINTMENT (OUTPATIENT)
Dept: CARDIOLOGY | Facility: CLINIC | Age: 72
End: 2022-06-06
Payer: MEDICARE

## 2022-06-06 VITALS
DIASTOLIC BLOOD PRESSURE: 106 MMHG | SYSTOLIC BLOOD PRESSURE: 196 MMHG | OXYGEN SATURATION: 96 % | HEIGHT: 62 IN | BODY MASS INDEX: 45.82 KG/M2 | HEART RATE: 84 BPM | WEIGHT: 249 LBS

## 2022-06-06 VITALS — DIASTOLIC BLOOD PRESSURE: 100 MMHG | SYSTOLIC BLOOD PRESSURE: 180 MMHG

## 2022-06-06 DIAGNOSIS — R06.00 DYSPNEA, UNSPECIFIED: ICD-10-CM

## 2022-06-06 PROCEDURE — 93000 ELECTROCARDIOGRAM COMPLETE: CPT

## 2022-06-06 PROCEDURE — 99214 OFFICE O/P EST MOD 30 MIN: CPT

## 2022-06-06 NOTE — HISTORY OF PRESENT ILLNESS
[FreeTextEntry1] : 70 year old woman with a history of obesity, hypertension, fibromyalgia. She presented to  with hypertensive emergency with a CVA. Her MRI was positive for CVA in different distributions. \par \par She presents for a followup. \par Since her last visit, she has been more stressed given his family's healthy issues. \par \par Otherwise she has been doing relatively well. She is complaining of more aching in her legs. She feels that it is from her varicose veins. She intermittently will get cramping in her calves at night which coconut water helps with. \par \par she has been having more palpitations daily, last for minutes, self limiting, occurring randomly. It has been happening for last 3 weeks. \par \par Her palpitations have resolved after changing the brand of coffee she was using. \par \par She  denies any chest pain, PND, orthopnea, lower extremity edema, near syncope, syncope, strokelike symptoms. She has been more sedentary given the cold weather. Her HERNÁNDEZ has remained stable. \par \par She did nto switch to the clonidine patch. Her GI issues have resolved. Medication reconciliation performed. She is complaint with her medications.   \par  \par  \par  \par \par  \par \par

## 2022-06-06 NOTE — DISCUSSION/SUMMARY
[FreeTextEntry1] : 71 year woman with a history as listed presents for a followup cardiac evaluation. \par \par Karla is feeling better. Her palpitations have resolved after changing her coffee brand. She is still pending her Zio. She denies any anginal symptoms. Clinically she is euvolemic on exam. Her EKG is unchanged from previous. Her last pharm nuclear was normal. \par \par her blood pressure is uncontrolled. She will continue her Coreg 25mg q12 and  Enalapril 20mg Q12.  She will continue Hydralazine 100mg Q8.   She did not tolerate the Aldactone. She did not tolerates CCBs secondary to lower extremity edema.  She is only on HCTZ as PRN for lower extremity edema (historically becomes hypoNa).  She did not tolerate the Doxazosin. I will try to switch her to a clonidine 0.1mg patch. She will try to maintain a BP log at home. Reducing dietary salt intake advised.  \par \par I will refer to vasular to evaluate her varicose veins and to rule out arterial disease. \par Her MRI reveal infarcts in multiple vascular territories. I have offered her a ILR implantation. She states that she will think about it still. She is not convinced. She wants to hold off for now.  Will see what the Zio patch reveals. \par \par She will continue with ASA 81mg Qday. \par \par Exercise and diet counseling was performed in order to reduce her future cardiovascular risk. She will followup with me in  1 months for a BP check or sooner if necessary. \par KARLA will followup with you for all of her other medical needs.

## 2022-06-06 NOTE — REVIEW OF SYSTEMS
[Dyspnea on exertion] : not dyspnea during exertion [Leg Claudication] : intermittent leg claudication [Joint Pain] : joint pain [Joint Stiffness] : joint stiffness [Numbness (Hypoesthesia)] : numbness [Negative] : Heme/Lymph

## 2022-06-06 NOTE — CARDIOLOGY SUMMARY
[de-identified] : Sinus  Rhythm \par -Right bundle branch block with left axis -bifascicular block. \par  Voltage criteria for LVH  [de-identified] : 6/2019 pharm nuc neg spect \par 2/1/22 pharm nuc neg spect  [de-identified] : 5/2019 normal systolic LV function with mild LVH\par 7/2021 Normal LV function. mild diastolic dysfunction. \par

## 2022-06-16 ENCOUNTER — RX RENEWAL (OUTPATIENT)
Age: 72
End: 2022-06-16

## 2022-06-17 ENCOUNTER — APPOINTMENT (OUTPATIENT)
Dept: INTERNAL MEDICINE | Facility: CLINIC | Age: 72
End: 2022-06-17
Payer: MEDICARE

## 2022-06-17 VITALS
HEART RATE: 81 BPM | HEIGHT: 62 IN | SYSTOLIC BLOOD PRESSURE: 170 MMHG | WEIGHT: 249 LBS | OXYGEN SATURATION: 98 % | DIASTOLIC BLOOD PRESSURE: 96 MMHG | BODY MASS INDEX: 45.82 KG/M2 | TEMPERATURE: 96.4 F | RESPIRATION RATE: 18 BRPM

## 2022-06-17 VITALS — SYSTOLIC BLOOD PRESSURE: 140 MMHG | DIASTOLIC BLOOD PRESSURE: 84 MMHG

## 2022-06-17 DIAGNOSIS — Z00.00 ENCOUNTER FOR GENERAL ADULT MEDICAL EXAMINATION W/OUT ABNORMAL FINDINGS: ICD-10-CM

## 2022-06-17 PROCEDURE — G0439: CPT

## 2022-06-17 NOTE — HISTORY OF PRESENT ILLNESS
[de-identified] : Has macular degeneration and goes for injections. She has been seeing a pinpoint black dot. She was in Dr Powers's office and had testing done on 6/2/22. It was 2 hours later and not seen by Dr Powers and had to go. She has not gotten her results back yet. She saw Dr Diallo since then too. \par \austin Alvarez is switching her to clonidine patch. \par \austin Has appt with JOSE Miranda, in July. \austin Saw ENT and had wax removed.

## 2022-06-29 ENCOUNTER — APPOINTMENT (OUTPATIENT)
Dept: VASCULAR SURGERY | Facility: CLINIC | Age: 72
End: 2022-06-29
Payer: MEDICARE

## 2022-06-29 ENCOUNTER — APPOINTMENT (OUTPATIENT)
Dept: CARDIOLOGY | Facility: CLINIC | Age: 72
End: 2022-06-29

## 2022-06-29 ENCOUNTER — APPOINTMENT (OUTPATIENT)
Dept: VASCULAR SURGERY | Facility: CLINIC | Age: 72
End: 2022-06-29

## 2022-06-29 VITALS — DIASTOLIC BLOOD PRESSURE: 96 MMHG | SYSTOLIC BLOOD PRESSURE: 179 MMHG

## 2022-06-29 VITALS
BODY MASS INDEX: 45.82 KG/M2 | WEIGHT: 248.99 LBS | OXYGEN SATURATION: 97 % | HEIGHT: 62 IN | SYSTOLIC BLOOD PRESSURE: 184 MMHG | DIASTOLIC BLOOD PRESSURE: 101 MMHG | HEART RATE: 82 BPM

## 2022-06-29 VITALS — OXYGEN SATURATION: 98 % | HEART RATE: 75 BPM | BODY MASS INDEX: 45.54 KG/M2 | HEIGHT: 62 IN

## 2022-06-29 VITALS — DIASTOLIC BLOOD PRESSURE: 98 MMHG | SYSTOLIC BLOOD PRESSURE: 160 MMHG

## 2022-06-29 DIAGNOSIS — I83.899 VARICOSE VEINS OF UNSPECIFIED LOWER EXTREMITY WITH OTHER COMPLICATIONS: ICD-10-CM

## 2022-06-29 DIAGNOSIS — I83.819 VARICOSE VEINS OF UNSPECIFIED LOWER EXTREMITY WITH PAIN: ICD-10-CM

## 2022-06-29 PROCEDURE — 93970 EXTREMITY STUDY: CPT

## 2022-06-29 PROCEDURE — 99203 OFFICE O/P NEW LOW 30 MIN: CPT

## 2022-06-29 PROCEDURE — 93000 ELECTROCARDIOGRAM COMPLETE: CPT

## 2022-06-29 PROCEDURE — 99213 OFFICE O/P EST LOW 20 MIN: CPT

## 2022-06-29 NOTE — PHYSICAL EXAM
[2+] : left 2+ [Ankle Swelling (On Exam)] : present [Ankle Swelling On The Left] : moderate [Varicose Veins Of Lower Extremities] : present [Varicose Veins Of The Left Leg] : of the left leg [Ankle Swelling Bilaterally] : severe [] : not present [FreeTextEntry1] : Palp pulses, cap ref 2 sec [de-identified] : L inner thigh 7 mm VV tracking along the inner thigh

## 2022-06-29 NOTE — ASSESSMENT
[FreeTextEntry1] : 70 yo F with B GSV insuf. Symptomatic. Pain and swelling that improves with elevation. Pain wakes her up at night but legs get very tired and throb when walking.

## 2022-06-29 NOTE — REVIEW OF SYSTEMS
[Leg Claudication] : intermittent leg claudication [Joint Pain] : joint pain [Joint Stiffness] : joint stiffness [Numbness (Hypoesthesia)] : numbness [Negative] : Heme/Lymph

## 2022-06-29 NOTE — HISTORY OF PRESENT ILLNESS
[FreeTextEntry1] : 72 yo F with long standing hx of BLE swelling. Has been noticing more prominent VV lately. She feels her legs are heavy and throbbing all day long. Symptoms worsen at the end of the day and improve with elevation. She worn stockings in the past but not regularly. 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 She has strong family hx of VV.

## 2022-07-01 NOTE — CARDIOLOGY SUMMARY
[de-identified] : No ECG today \par \par  6/6/22-Sinus  Rhythm \par -Right bundle branch block with left axis -bifascicular block. \par  Voltage criteria for LVH  [de-identified] : 6/2019 pharm nuc neg spect \par 2/1/22 pharm nuc neg spect  [de-identified] : 5/2019 normal systolic LV function with mild LVH\par 7/2021 Normal LV function. mild diastolic dysfunction. \par

## 2022-07-01 NOTE — END OF VISIT
[FreeTextEntry3] : I saw and evaluated the patient and discussed the care with the NP provider above on 06/29/2022 . I agree with the findings and plan as documented in the note above. She will try to maintain a BP log at home. Reducing dietary salt intake advised. may need to increase clonidine patch

## 2022-07-01 NOTE — DISCUSSION/SUMMARY
[FreeTextEntry1] : 71 year woman with a history as listed presents for a followup cardiac evaluation. \par \par Karla is feeling better. she is in no acute distress.  She is euvolemic on exam.  She denies any anginal symptoms. Her last pharm nuclear was normal. \par Her blood pressure is still a bit elevated but has been better at other doctor's visits.  I have advised that she keep a blood pressure log at home for the next month.  she will check it twice a day during this time.  In the mean time, she will continue clonidine patch 0.1mg once a week, coreg 25mg BID, hydralazine 100mg TID.  If her blood pressure log reveals primarily uncontrolled readings, will likely increase clonidine to 0.2mg qweek.  \par \par  She did not tolerate the Aldactone. She did not tolerates CCBs secondary to lower extremity edema.  She is only on HCTZ as PRN for lower extremity edema (historically becomes hypoNa).  She did not tolerate the Doxazosin.\par \par [facility] stroke management.  She is still pending her Zio. she will continue ASA 81, Crestor 10mg.  Goal LDL of <70.  \par Her MRI reveal infarcts in multiple vascular territories. I have offered her a ILR implantation. She states that she will think about it still. She is not convinced. She wants to hold off for now.  Will see what the Zio patch reveals. \par \par Exercise and diet counseling was performed in order to reduce her future cardiovascular risk. She will followup with me in  1 months for a BP check or sooner if necessary. \par KARLA will followup with you for all of her other medical needs.

## 2022-07-01 NOTE — PHYSICAL EXAM
[Well Groomed] : well groomed [General Appearance - In No Acute Distress] : no acute distress [Eyelids - No Xanthelasma] : the eyelids demonstrated no xanthelasmas [Normal Oral Mucosa] : normal oral mucosa [No Oral Pallor] : no oral pallor [No Oral Cyanosis] : no oral cyanosis [Normal Jugular Venous A Waves Present] : normal jugular venous A waves present [Normal Jugular Venous V Waves Present] : normal jugular venous V waves present [No Jugular Venous Hale A Waves] : no jugular venous hale A waves [Respiration, Rhythm And Depth] : normal respiratory rhythm and effort [Exaggerated Use Of Accessory Muscles For Inspiration] : no accessory muscle use [Auscultation Breath Sounds / Voice Sounds] : lungs were clear to auscultation bilaterally [Abdomen Soft] : soft [Abdomen Tenderness] : non-tender [Abdomen Mass (___ Cm)] : no abdominal mass palpated [Abnormal Walk] : normal gait [Gait - Sufficient For Exercise Testing] : the gait was sufficient for exercise testing [Nail Clubbing] : no clubbing of the fingernails [Cyanosis, Localized] : no localized cyanosis [Petechial Hemorrhages (___cm)] : no petechial hemorrhages [Skin Color & Pigmentation] : normal skin color and pigmentation [] : no rash [No Venous Stasis] : no venous stasis [Skin Lesions] : no skin lesions [No Skin Ulcers] : no skin ulcer [No Xanthoma] : no  xanthoma was observed [Oriented To Time, Place, And Person] : oriented to person, place, and time [Affect] : the affect was normal [Mood] : the mood was normal [No Anxiety] : not feeling anxious [Normal Rate] : normal [Distant] : the heart sounds were distant [I] : a grade 1 [2+] : left 2+ [Well Developed] : well developed [Well Nourished] : well nourished [No Acute Distress] : no acute distress [Normal Conjunctiva] : normal conjunctiva [Normal Venous Pressure] : normal venous pressure [No Carotid Bruit] : no carotid bruit [Normal S1, S2] : normal S1, S2 [No Rub] : no rub [No Gallop] : no gallop [Rhythm Regular] : regular [Normal S1] : normal S1 [Normal S2] : normal S2 [No Murmur] : no murmurs heard [No Pitting Edema] : no pitting edema present [Rt] : varicose veins of the right leg noted [Lt] : varicose veins of the left leg noted [No Abnormalities] : the abdominal aorta was not enlarged and no bruit was heard [Clear Lung Fields] : clear lung fields [Good Air Entry] : good air entry [No Respiratory Distress] : no respiratory distress  [Soft] : abdomen soft [Non Tender] : non-tender [No Masses/organomegaly] : no masses/organomegaly [Normal Bowel Sounds] : normal bowel sounds [Normal Gait] : normal gait [No Edema] : no edema [No Cyanosis] : no cyanosis [No Clubbing] : no clubbing [No Varicosities] : no varicosities [No Rash] : no rash [No Skin Lesions] : no skin lesions [Moves all extremities] : moves all extremities [No Focal Deficits] : no focal deficits [Normal Speech] : normal speech [Alert and Oriented] : alert and oriented [Normal memory] : normal memory [Right Carotid Bruit] : no bruit heard over the right carotid [Left Carotid Bruit] : no bruit heard over the left carotid [Bruit] : no bruit heard

## 2022-07-01 NOTE — HISTORY OF PRESENT ILLNESS
[FreeTextEntry1] : 71 year old woman with a history of obesity, hypertension, fibromyalgia. She presented to  with hypertensive emergency with a CVA. Her MRI was positive for CVA in different distributions. \par \par Previous visit History:\par She presents for a followup. \par Since her last visit, she has been more stressed given his family's healthy issues. \par \par Otherwise she has been doing relatively well. She is complaining of more aching in her legs. She feels that it is from her varicose veins. She intermittently will get cramping in her calves at night which coconut water helps with. \par \par She has been having more palpitations daily, last for minutes, self limiting, occurring randomly. It has been happening for last 3 weeks. \par \par Her palpitations have resolved after changing the brand of coffee she was using. \par \par She  denies any chest pain, PND, orthopnea, lower extremity edema, near syncope, syncope, strokelike symptoms. She has been more sedentary given the cold weather. Her HERNÁNDEZ has remained stable. \par \par She did not switch to the clonidine patch. Her GI issues have resolved. Medication reconciliation performed. She is complaint with her medications.\par  \par \par Ms Vu presents today 6/29/22 for follow up after adding Clonidine Patch to her regimen.\par \par she does not check her blood pressures at home, however, she has had follow up with her PCP and hematologist, both times she states readings were 130s/70s.  She has been consistent with the patch thus far, placing every Sunday.  she has not yet placed the ZIO patch for monitor, she will put it on today. \par \par she had follow up with vascular today regarding varicose veins.   \par \par She denies dizzy spells. She is ambulating with a cane as needed.  She denies chest pains or pressures, denies palpitations. \par  \par  \par  \par \par  \par \par

## 2022-07-09 ENCOUNTER — EMERGENCY (EMERGENCY)
Facility: HOSPITAL | Age: 72
LOS: 1 days | Discharge: HOME | End: 2022-07-09
Attending: EMERGENCY MEDICINE | Admitting: EMERGENCY MEDICINE
Payer: MEDICARE

## 2022-07-09 VITALS
HEART RATE: 82 BPM | DIASTOLIC BLOOD PRESSURE: 88 MMHG | OXYGEN SATURATION: 98 % | RESPIRATION RATE: 19 BRPM | TEMPERATURE: 98 F | SYSTOLIC BLOOD PRESSURE: 200 MMHG

## 2022-07-09 VITALS
WEIGHT: 253.97 LBS | DIASTOLIC BLOOD PRESSURE: 137 MMHG | RESPIRATION RATE: 18 BRPM | HEART RATE: 86 BPM | OXYGEN SATURATION: 98 % | HEIGHT: 62 IN | TEMPERATURE: 98 F | SYSTOLIC BLOOD PRESSURE: 200 MMHG

## 2022-07-09 DIAGNOSIS — Z98.89 OTHER SPECIFIED POSTPROCEDURAL STATES: Chronic | ICD-10-CM

## 2022-07-09 LAB
ALBUMIN SERPL ELPH-MCNC: 4.1 G/DL — SIGNIFICANT CHANGE UP (ref 3.3–5)
ALP SERPL-CCNC: 89 U/L — SIGNIFICANT CHANGE UP (ref 40–120)
ALT FLD-CCNC: 30 U/L — SIGNIFICANT CHANGE UP (ref 12–78)
ANION GAP SERPL CALC-SCNC: 9 MMOL/L — SIGNIFICANT CHANGE UP (ref 5–17)
APPEARANCE UR: CLEAR — SIGNIFICANT CHANGE UP
AST SERPL-CCNC: 25 U/L — SIGNIFICANT CHANGE UP (ref 15–37)
BACTERIA # UR AUTO: ABNORMAL
BASOPHILS # BLD AUTO: 0.07 K/UL — SIGNIFICANT CHANGE UP (ref 0–0.2)
BASOPHILS NFR BLD AUTO: 0.9 % — SIGNIFICANT CHANGE UP (ref 0–2)
BILIRUB SERPL-MCNC: 0.5 MG/DL — SIGNIFICANT CHANGE UP (ref 0.2–1.2)
BILIRUB UR-MCNC: NEGATIVE — SIGNIFICANT CHANGE UP
BUN SERPL-MCNC: 15 MG/DL — SIGNIFICANT CHANGE UP (ref 7–23)
CALCIUM SERPL-MCNC: 9.4 MG/DL — SIGNIFICANT CHANGE UP (ref 8.5–10.1)
CHLORIDE SERPL-SCNC: 103 MMOL/L — SIGNIFICANT CHANGE UP (ref 96–108)
CO2 SERPL-SCNC: 22 MMOL/L — SIGNIFICANT CHANGE UP (ref 22–31)
COLOR SPEC: YELLOW — SIGNIFICANT CHANGE UP
CREAT SERPL-MCNC: 0.75 MG/DL — SIGNIFICANT CHANGE UP (ref 0.5–1.3)
DIFF PNL FLD: ABNORMAL
EGFR: 85 ML/MIN/1.73M2 — SIGNIFICANT CHANGE UP
EOSINOPHIL # BLD AUTO: 0.16 K/UL — SIGNIFICANT CHANGE UP (ref 0–0.5)
EOSINOPHIL NFR BLD AUTO: 2.2 % — SIGNIFICANT CHANGE UP (ref 0–6)
EPI CELLS # UR: SIGNIFICANT CHANGE UP
GLUCOSE SERPL-MCNC: 155 MG/DL — HIGH (ref 70–99)
GLUCOSE UR QL: NEGATIVE — SIGNIFICANT CHANGE UP
HCT VFR BLD CALC: 41.4 % — SIGNIFICANT CHANGE UP (ref 34.5–45)
HGB BLD-MCNC: 13.8 G/DL — SIGNIFICANT CHANGE UP (ref 11.5–15.5)
IMM GRANULOCYTES NFR BLD AUTO: 0.7 % — SIGNIFICANT CHANGE UP (ref 0–1.5)
KETONES UR-MCNC: NEGATIVE — SIGNIFICANT CHANGE UP
LEUKOCYTE ESTERASE UR-ACNC: ABNORMAL
LYMPHOCYTES # BLD AUTO: 0.94 K/UL — LOW (ref 1–3.3)
LYMPHOCYTES # BLD AUTO: 12.6 % — LOW (ref 13–44)
MAGNESIUM SERPL-MCNC: 1.8 MG/DL — SIGNIFICANT CHANGE UP (ref 1.6–2.6)
MCHC RBC-ENTMCNC: 30.5 PG — SIGNIFICANT CHANGE UP (ref 27–34)
MCHC RBC-ENTMCNC: 33.3 GM/DL — SIGNIFICANT CHANGE UP (ref 32–36)
MCV RBC AUTO: 91.4 FL — SIGNIFICANT CHANGE UP (ref 80–100)
MONOCYTES # BLD AUTO: 0.49 K/UL — SIGNIFICANT CHANGE UP (ref 0–0.9)
MONOCYTES NFR BLD AUTO: 6.6 % — SIGNIFICANT CHANGE UP (ref 2–14)
NEUTROPHILS # BLD AUTO: 5.73 K/UL — SIGNIFICANT CHANGE UP (ref 1.8–7.4)
NEUTROPHILS NFR BLD AUTO: 77 % — SIGNIFICANT CHANGE UP (ref 43–77)
NITRITE UR-MCNC: NEGATIVE — SIGNIFICANT CHANGE UP
NRBC # BLD: 0 /100 WBCS — SIGNIFICANT CHANGE UP (ref 0–0)
NT-PROBNP SERPL-SCNC: 508 PG/ML — HIGH (ref 0–125)
PH UR: 8 — SIGNIFICANT CHANGE UP (ref 5–8)
PLATELET # BLD AUTO: 283 K/UL — SIGNIFICANT CHANGE UP (ref 150–400)
POTASSIUM SERPL-MCNC: 4.4 MMOL/L — SIGNIFICANT CHANGE UP (ref 3.5–5.3)
POTASSIUM SERPL-SCNC: 4.4 MMOL/L — SIGNIFICANT CHANGE UP (ref 3.5–5.3)
PROT SERPL-MCNC: 7.7 G/DL — SIGNIFICANT CHANGE UP (ref 6–8.3)
PROT UR-MCNC: 15
RBC # BLD: 4.53 M/UL — SIGNIFICANT CHANGE UP (ref 3.8–5.2)
RBC # FLD: 13.1 % — SIGNIFICANT CHANGE UP (ref 10.3–14.5)
RBC CASTS # UR COMP ASSIST: ABNORMAL /HPF (ref 0–4)
SARS-COV-2 RNA SPEC QL NAA+PROBE: SIGNIFICANT CHANGE UP
SODIUM SERPL-SCNC: 134 MMOL/L — LOW (ref 135–145)
SP GR SPEC: 1 — LOW (ref 1.01–1.02)
TROPONIN I, HIGH SENSITIVITY RESULT: 8.1 NG/L — SIGNIFICANT CHANGE UP
UROBILINOGEN FLD QL: NEGATIVE — SIGNIFICANT CHANGE UP
WBC # BLD: 7.44 K/UL — SIGNIFICANT CHANGE UP (ref 3.8–10.5)
WBC # FLD AUTO: 7.44 K/UL — SIGNIFICANT CHANGE UP (ref 3.8–10.5)
WBC UR QL: ABNORMAL

## 2022-07-09 PROCEDURE — 93010 ELECTROCARDIOGRAM REPORT: CPT

## 2022-07-09 PROCEDURE — 83880 ASSAY OF NATRIURETIC PEPTIDE: CPT

## 2022-07-09 PROCEDURE — 80053 COMPREHEN METABOLIC PANEL: CPT

## 2022-07-09 PROCEDURE — 99285 EMERGENCY DEPT VISIT HI MDM: CPT

## 2022-07-09 PROCEDURE — 84484 ASSAY OF TROPONIN QUANT: CPT

## 2022-07-09 PROCEDURE — 99285 EMERGENCY DEPT VISIT HI MDM: CPT | Mod: 25

## 2022-07-09 PROCEDURE — 71046 X-RAY EXAM CHEST 2 VIEWS: CPT

## 2022-07-09 PROCEDURE — 36415 COLL VENOUS BLD VENIPUNCTURE: CPT

## 2022-07-09 PROCEDURE — 87635 SARS-COV-2 COVID-19 AMP PRB: CPT

## 2022-07-09 PROCEDURE — 96374 THER/PROPH/DIAG INJ IV PUSH: CPT

## 2022-07-09 PROCEDURE — 85025 COMPLETE CBC W/AUTO DIFF WBC: CPT

## 2022-07-09 PROCEDURE — 93005 ELECTROCARDIOGRAM TRACING: CPT

## 2022-07-09 PROCEDURE — 83735 ASSAY OF MAGNESIUM: CPT

## 2022-07-09 PROCEDURE — 81001 URINALYSIS AUTO W/SCOPE: CPT

## 2022-07-09 PROCEDURE — 71046 X-RAY EXAM CHEST 2 VIEWS: CPT | Mod: 26

## 2022-07-09 RX ORDER — HYDRALAZINE HCL 50 MG
10 TABLET ORAL ONCE
Refills: 0 | Status: COMPLETED | OUTPATIENT
Start: 2022-07-09 | End: 2022-07-09

## 2022-07-09 RX ORDER — ALPRAZOLAM 0.25 MG
0.25 TABLET ORAL ONCE
Refills: 0 | Status: DISCONTINUED | OUTPATIENT
Start: 2022-07-09 | End: 2022-07-09

## 2022-07-09 RX ADMIN — Medication 10 MILLIGRAM(S): at 21:02

## 2022-07-09 RX ADMIN — Medication 0.25 MILLIGRAM(S): at 21:02

## 2022-07-09 NOTE — ED ADULT NURSE NOTE - OBJECTIVE STATEMENT
Pt c/o HTN, headache x 2 days. Denies blurry vision. nausea, weakness, numbness, tingling. Pt c/o HTN, headache, anxiety x 2 days. Denies blurry vision. nausea, weakness, numbness, tingling, SOB, CP. Pt placed on monitor. Family at bedside. Safety maintained, call bell within reach. Nursing care ongoing.

## 2022-07-09 NOTE — ED ADULT TRIAGE NOTE - CHIEF COMPLAINT QUOTE
pt ambulatory to ED A&Ox4 with complaints of hypertension. denies SOB/palpitations. c/o accompanied anxiety.

## 2022-07-09 NOTE — ED PROVIDER NOTE - PATIENT PORTAL LINK FT
You can access the FollowMyHealth Patient Portal offered by [facility] by registering at the following website: [email or web address]. By joining [employer]’s FollowMyHealth portal, you will also be able to view your health information using other applications (apps) compatible with our system.

## 2022-07-09 NOTE — ED PROVIDER NOTE - OBJECTIVE STATEMENT
Pt is a 70 yo f whose pmd is dr easley cardiology dr lea almonte hx of hypertension with hypertensive urgency strokes dm hld mi   who has been followed over past 10 days by cardiology for elevated blood pressures.  she was told to monitor her bp for a week and keep a log, then follow up next week to consider increasing her clonidine patch.  pt just started checking her bp at home today and found it to be very high so she became very fearful of past events of stroke mi and hypertensive urgency so she came to er for evaluation  no cp no sob no neuro sx other than mild headache. she is certain she is compliant with her prescribed meds (list reviewed)  no fever no rash no leg edema  she does endorse frquent urination and dietary non compliance.

## 2022-07-09 NOTE — ED PROVIDER NOTE - NSICDXPASTMEDICALHX_GEN_ALL_CORE_FT
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

## 2022-07-09 NOTE — ED PROVIDER NOTE - CLINICAL SUMMARY MEDICAL DECISION MAKING FREE TEXT BOX
72 yo f who has multiple med problems including complex hypertensive history onmultiple meds being followed by cardiology for considerationof increased clonodine patch dosing. presented to er with htn and anxiety  plan to treat the anxiety and htn with iv form ofher po meds   re eval ro acs or end organ inury with labs xray ekg   monitor  pt did well will take pmdmeds and dc

## 2022-07-09 NOTE — ED PROVIDER NOTE - NSFOLLOWUPINSTRUCTIONS_ED_ALL_ED_FT
increase your clonidine to 0.2 mg as our conversation about the plan of care described to you by your cardiologist dr Ajit Almonte  follow up with dr almonte Monday  call 911 if you have worsening symptoms or any concerns  consider use of the xanax regularly or other anxiolytic therapeutic options  follow up with your primary care doctor  watch your diet, consider low salt low sugar low fat    Hypertension, Adult      High blood pressure (hypertension) is when the force of blood pumping through the arteries is too strong. The arteries are the blood vessels that carry blood from the heart throughout the body. Hypertension forces the heart to work harder to pump blood and may cause arteries to become narrow or stiff. Untreated or uncontrolled hypertension can cause a heart attack, heart failure, a stroke, kidney disease, and other problems.    A blood pressure reading consists of a higher number over a lower number. Ideally, your blood pressure should be below 120/80. The first ("top") number is called the systolic pressure. It is a measure of the pressure in your arteries as your heart beats. The second ("bottom") number is called the diastolic pressure. It is a measure of the pressure in your arteries as the heart relaxes.      What are the causes?    The exact cause of this condition is not known. There are some conditions that result in or are related to high blood pressure.      What increases the risk?    Some risk factors for high blood pressure are under your control. The following factors may make you more likely to develop this condition:  •Smoking.      •Having type 2 diabetes mellitus, high cholesterol, or both.      •Not getting enough exercise or physical activity.      •Being overweight.      •Having too much fat, sugar, calories, or salt (sodium) in your diet.      •Drinking too much alcohol.      Some risk factors for high blood pressure may be difficult or impossible to change. Some of these factors include:  •Having chronic kidney disease.      •Having a family history of high blood pressure.      •Age. Risk increases with age.      •Race. You may be at higher risk if you are .      •Gender. Men are at higher risk than women before age 45. After age 65, women are at higher risk than men.      •Having obstructive sleep apnea.      •Stress.        What are the signs or symptoms?    High blood pressure may not cause symptoms. Very high blood pressure (hypertensive crisis) may cause:  •Headache.      •Anxiety.      •Shortness of breath.      •Nosebleed.      •Nausea and vomiting.      •Vision changes.      •Severe chest pain.      •Seizures.        How is this diagnosed?    This condition is diagnosed by measuring your blood pressure while you are seated, with your arm resting on a flat surface, your legs uncrossed, and your feet flat on the floor. The cuff of the blood pressure monitor will be placed directly against the skin of your upper arm at the level of your heart. It should be measured at least twice using the same arm. Certain conditions can cause a difference in blood pressure between your right and left arms.    Certain factors can cause blood pressure readings to be lower or higher than normal for a short period of time:  •When your blood pressure is higher when you are in a health care provider's office than when you are at home, this is called white coat hypertension. Most people with this condition do not need medicines.      •When your blood pressure is higher at home than when you are in a health care provider's office, this is called masked hypertension. Most people with this condition may need medicines to control blood pressure.      If you have a high blood pressure reading during one visit or you have normal blood pressure with other risk factors, you may be asked to:  •Return on a different day to have your blood pressure checked again.      •Monitor your blood pressure at home for 1 week or longer.      If you are diagnosed with hypertension, you may have other blood or imaging tests to help your health care provider understand your overall risk for other conditions.      How is this treated?    This condition is treated by making healthy lifestyle changes, such as eating healthy foods, exercising more, and reducing your alcohol intake. Your health care provider may prescribe medicine if lifestyle changes are not enough to get your blood pressure under control, and if:  •Your systolic blood pressure is above 130.      •Your diastolic blood pressure is above 80.      Your personal target blood pressure may vary depending on your medical conditions, your age, and other factors.      Follow these instructions at home:      Eating and drinking    •Eat a diet that is high in fiber and potassium, and low in sodium, added sugar, and fat. An example eating plan is called the DASH (Dietary Approaches to Stop Hypertension) diet. To eat this way:  •Eat plenty of fresh fruits and vegetables. Try to fill one half of your plate at each meal with fruits and vegetables.      •Eat whole grains, such as whole-wheat pasta, brown rice, or whole-grain bread. Fill about one fourth of your plate with whole grains.      •Eat or drink low-fat dairy products, such as skim milk or low-fat yogurt.      •Avoid fatty cuts of meat, processed or cured meats, and poultry with skin. Fill about one fourth of your plate with lean proteins, such as fish, chicken without skin, beans, eggs, or tofu.      •Avoid pre-made and processed foods. These tend to be higher in sodium, added sugar, and fat.        •Reduce your daily sodium intake. Most people with hypertension should eat less than 1,500 mg of sodium a day.    • Do not drink alcohol if:  •Your health care provider tells you not to drink.      •You are pregnant, may be pregnant, or are planning to become pregnant.      •If you drink alcohol:•Limit how much you use to:  •0–1 drink a day for women.      •0–2 drinks a day for men.        •Be aware of how much alcohol is in your drink. In the [place] one drink equals one 12 oz bottle of beer (355 mL), one 5 oz glass of wine (148 mL), or one 1½ oz glass of hard liquor (44 mL).          Lifestyle      •Work with your health care provider to maintain a healthy body weight or to lose weight. Ask what an ideal weight is for you.      •Get at least 30 minutes of exercise most days of the week. Activities may include walking, swimming, or biking.      •Include exercise to strengthen your muscles (resistance exercise), such as Pilates or lifting weights, as part of your weekly exercise routine. Try to do these types of exercises for 30 minutes at least 3 days a week.      • Do not use any products that contain nicotine or tobacco, such as cigarettes, e-cigarettes, and chewing tobacco. If you need help quitting, ask your health care provider.      •Monitor your blood pressure at home as told by your health care provider.      •Keep all follow-up visits as told by your health care provider. This is important.      Medicines     •Take over-the-counter and prescription medicines only as told by your health care provider. Follow directions carefully. Blood pressure medicines must be taken as prescribed.      • Do not skip doses of blood pressure medicine. Doing this puts you at risk for problems and can make the medicine less effective.      •Ask your health care provider about side effects or reactions to medicines that you should watch for.        Contact a health care provider if you:    •Think you are having a reaction to a medicine you are taking.      •Have headaches that keep coming back (recurring).      •Feel dizzy.      •Have swelling in your ankles.      •Have trouble with your vision.        Get help right away if you:    •Develop a severe headache or confusion.      •Have unusual weakness or numbness.      •Feel faint.      •Have severe pain in your chest or abdomen.      •Vomit repeatedly.      •Have trouble breathing.        Summary    •Hypertension is when the force of blood pumping through your arteries is too strong. If this condition is not controlled, it may put you at risk for serious complications.      •Your personal target blood pressure may vary depending on your medical conditions, your age, and other factors. For most people, a normal blood pressure is less than 120/80.      •Hypertension is treated with lifestyle changes, medicines, or a combination of both. Lifestyle changes include losing weight, eating a healthy, low-sodium diet, exercising more, and limiting alcohol.      This information is not intended to replace advice given to you by your health care provider. Make sure you discuss any questions you have with your health care provider.

## 2022-07-09 NOTE — ED PROVIDER NOTE - PROGRESS NOTE DETAILS
pt feels better after xanax bp much improved pt feels better results reviewed at bedside systolic bp started to climb but pt did not take her melvin meds  she was adv to start the .2 mg clonidine patch tomorrow and follow upw ith dr lea almonte monday  she changes her patch weekly

## 2022-07-09 NOTE — ED PROVIDER NOTE - NSICDXPASTSURGICALHX_GEN_ALL_CORE_FT
PAST SURGICAL HISTORY:  S/P carpal tunnel release R 2009;  L 2013    S/P rotator cuff repair L shoulder, 2004

## 2022-07-09 NOTE — ED PROVIDER NOTE - CARE PROVIDER_API CALL
Ajit Alvarez)  Internal Medicine  [address] [place] [address]  Phone: [phone]  Fax: [phone]  Follow Up Time:

## 2022-07-10 ENCOUNTER — EMERGENCY (EMERGENCY)
Facility: HOSPITAL | Age: 72
LOS: 1 days | Discharge: HOME | End: 2022-07-10
Attending: EMERGENCY MEDICINE | Admitting: EMERGENCY MEDICINE
Payer: MEDICARE

## 2022-07-10 VITALS
RESPIRATION RATE: 20 BRPM | TEMPERATURE: 99 F | OXYGEN SATURATION: 98 % | HEART RATE: 76 BPM | SYSTOLIC BLOOD PRESSURE: 190 MMHG | DIASTOLIC BLOOD PRESSURE: 101 MMHG | WEIGHT: 253.97 LBS | HEIGHT: 62 IN

## 2022-07-10 DIAGNOSIS — Z98.89 OTHER SPECIFIED POSTPROCEDURAL STATES: Chronic | ICD-10-CM

## 2022-07-10 PROCEDURE — 99284 EMERGENCY DEPT VISIT MOD MDM: CPT

## 2022-07-10 NOTE — ED ADULT NURSE NOTE - CHPI ED NUR SYMPTOMS NEG
no back pain/no chest pain/no chills/no congestion/no diaphoresis/no dizziness/no fever/no shortness of breath

## 2022-07-10 NOTE — ED PROVIDER NOTE - OBJECTIVE STATEMENT
70yo female who presents with hypertension and headache. pt was seen yesterday for the same compliants, and was told to double up on her clonidine patch that she changes every sunday night, today she took the clonidine pill but did not change her patch and her bp was over 230, no dizziness no blurry or double vision, no other complaints

## 2022-07-10 NOTE — ED PROVIDER NOTE - PATIENT PORTAL LINK FT
You can access the FollowMyHealth Patient Portal offered by [facility] by registering at the following website: [email or web address]. By joining [employer]’s FollowMyHealth portal, you will also be able to view your health information using other applications (apps) compatible with our system.

## 2022-07-10 NOTE — ED PROVIDER NOTE - PROGRESS NOTE DETAILS
repeat bp is 175/84, pt advised to follow up with her cardiologist, take meds as prescribed, return if any symptoms worsen

## 2022-07-10 NOTE — ED ADULT NURSE NOTE - BRAND OF COVID-19 VACCINATION
"Chief Complaint   Patient presents with     Consult     Per Dr FAZAL Wolff regarding gross hematuria, UA done 04/09/2018. Has chronic back pain not sure some of his flank pain was from that, resolved.        Initial /72  Pulse 80  Temp 97.9  F (36.6  C) (Tympanic)  Resp 16  Ht 1.88 m (6' 2\")  Wt 104.3 kg (230 lb)  SpO2 97%  BMI 29.53 kg/m2 Estimated body mass index is 29.53 kg/(m^2) as calculated from the following:    Height as of this encounter: 1.88 m (6' 2\").    Weight as of this encounter: 104.3 kg (230 lb).  Medication Reconciliation: complete   Review of Systems:    Weight loss:    No     Recent fever/chills:  No   Night sweats:   No  Current skin rash:  No   Recent hair loss:  No  Heat intolerance:  No   Cold intolerance:  No  Chest pain:   No   Palpitations:   No  Shortness of breath:  No   Wheezing:   No  Constipation:    No   Diarrhea:   No   Nausea:   No   Vomiting:   No   Kidney/side pain:  yes   Back pain:   yes  Frequent headaches:  No   Dizziness:     No  Leg swelling:   No   Calf pain:    No    Parents, brothers or sisters with history of kidney cancer?   No  Parents, brothers or sisters with history of bladder cancer: No      "
Patient positioned in supine position, perineum area prepped with chlorhexidene Gluconate and patient draped per sterile technique. Per verbal order read back by Adolph Mathias MD, Urojet 10mL 2% lidocaine jelly to be instilled into urethra.  Urojet- 10ml 2% Lidocaine jelly instilled into the urethra.    Urojet 2%  Lot#: [id number]  Expiration date: 10/19  : [employer]  NDC: [address]-8    [facility] Protocol    A. Pre-procedure verification complete Yes  1-relevant information / documentation available, reviewed and properly matched to the patient; 2-consent accurate and complete, 3-equipment and supplies available    B. Site marking complete N/A  Site marked if not in continuous attendance with patient    C. TIME OUT completed Yes  Time Out was conducted just prior to starting procedure to verify the eight required elements: 1-patient identity, 2-consent accurate and complete, 3-position, 4-correct side/site marked (if applicable), 5-procedure, 6-relevant images / results properly labeled and displayed (if applicable), 7-antibiotics / irrigation fluids (if applicable), 8-safety precautions.    After procedure perineum area rinsed. Discharge instructions reviewed with patient. Patient verbalized understanding of discharge instructions and discharged ambulatory.    
Pfizer dose 1, 2, and 3

## 2022-07-10 NOTE — ED ADULT NURSE NOTE - OBJECTIVE STATEMENT
Pt has HTN. Pt was supposed to change her clonidine patch today and has not yet. Pt denies chest pain, palpitations, headache, dizziness, SOB, change in vision.

## 2022-07-11 VITALS — DIASTOLIC BLOOD PRESSURE: 84 MMHG | HEART RATE: 72 BPM | SYSTOLIC BLOOD PRESSURE: 175 MMHG

## 2022-07-11 PROCEDURE — 99283 EMERGENCY DEPT VISIT LOW MDM: CPT

## 2022-07-11 RX ADMIN — Medication 1 PATCH: at 00:08

## 2022-07-20 ENCOUNTER — APPOINTMENT (OUTPATIENT)
Dept: CARDIOLOGY | Facility: CLINIC | Age: 72
End: 2022-07-20

## 2022-07-20 ENCOUNTER — EMERGENCY (EMERGENCY)
Facility: HOSPITAL | Age: 72
LOS: 1 days | Discharge: HOME | End: 2022-07-20
Attending: EMERGENCY MEDICINE | Admitting: EMERGENCY MEDICINE
Payer: MEDICARE

## 2022-07-20 VITALS
TEMPERATURE: 99 F | HEART RATE: 72 BPM | DIASTOLIC BLOOD PRESSURE: 85 MMHG | OXYGEN SATURATION: 97 % | SYSTOLIC BLOOD PRESSURE: 182 MMHG | HEIGHT: 62 IN | RESPIRATION RATE: 15 BRPM

## 2022-07-20 VITALS
RESPIRATION RATE: 16 BRPM | HEART RATE: 72 BPM | TEMPERATURE: 99 F | DIASTOLIC BLOOD PRESSURE: 65 MMHG | SYSTOLIC BLOOD PRESSURE: 170 MMHG | OXYGEN SATURATION: 97 %

## 2022-07-20 DIAGNOSIS — Z98.89 OTHER SPECIFIED POSTPROCEDURAL STATES: Chronic | ICD-10-CM

## 2022-07-20 PROCEDURE — 72131 CT LUMBAR SPINE W/O DYE: CPT | Mod: 26,MA

## 2022-07-20 PROCEDURE — 99284 EMERGENCY DEPT VISIT MOD MDM: CPT | Mod: 25

## 2022-07-20 PROCEDURE — 72131 CT LUMBAR SPINE W/O DYE: CPT | Mod: MA

## 2022-07-20 PROCEDURE — 99284 EMERGENCY DEPT VISIT MOD MDM: CPT

## 2022-07-20 RX ORDER — PREGABALIN 225 MG/1
1 CAPSULE ORAL
Qty: 0 | Refills: 0 | DISCHARGE

## 2022-07-20 RX ORDER — CARVEDILOL PHOSPHATE 80 MG/1
1 CAPSULE, EXTENDED RELEASE ORAL
Qty: 0 | Refills: 0 | DISCHARGE

## 2022-07-20 RX ORDER — TRAMADOL HYDROCHLORIDE 50 MG/1
1 TABLET ORAL
Qty: 16 | Refills: 0
Start: 2022-07-20 | End: 2022-07-23

## 2022-07-20 RX ORDER — METFORMIN HYDROCHLORIDE 850 MG/1
1 TABLET ORAL
Qty: 0 | Refills: 0 | DISCHARGE

## 2022-07-20 RX ORDER — UBIDECARENONE 100 MG
1 CAPSULE ORAL
Qty: 0 | Refills: 0 | DISCHARGE

## 2022-07-20 RX ORDER — METFORMIN HYDROCHLORIDE 850 MG/1
500 TABLET ORAL
Qty: 0 | Refills: 0 | DISCHARGE

## 2022-07-20 RX ORDER — CYCLOSPORINE 0.5 MG/ML
1 EMULSION OPHTHALMIC
Qty: 0 | Refills: 0 | DISCHARGE

## 2022-07-20 RX ORDER — FAMOTIDINE 10 MG/ML
1 INJECTION INTRAVENOUS
Qty: 0 | Refills: 0 | DISCHARGE

## 2022-07-20 RX ORDER — ROSUVASTATIN CALCIUM 5 MG/1
1 TABLET ORAL
Qty: 0 | Refills: 0 | DISCHARGE

## 2022-07-20 RX ORDER — ALPRAZOLAM 0.25 MG
1 TABLET ORAL
Qty: 0 | Refills: 0 | DISCHARGE

## 2022-07-20 RX ORDER — HYDRALAZINE HCL 50 MG
1 TABLET ORAL
Qty: 0 | Refills: 0 | DISCHARGE

## 2022-07-20 RX ORDER — CETIRIZINE HYDROCHLORIDE 10 MG/1
1 TABLET ORAL
Qty: 0 | Refills: 0 | DISCHARGE

## 2022-07-20 RX ORDER — CYCLOBENZAPRINE HYDROCHLORIDE 10 MG/1
1 TABLET, FILM COATED ORAL
Qty: 15 | Refills: 0
Start: 2022-07-20 | End: 2022-07-24

## 2022-07-20 RX ORDER — LANSOPRAZOLE 15 MG/1
1 CAPSULE, DELAYED RELEASE ORAL
Qty: 0 | Refills: 0 | DISCHARGE

## 2022-07-20 RX ORDER — OXYCODONE AND ACETAMINOPHEN 5; 325 MG/1; MG/1
2 TABLET ORAL ONCE
Refills: 0 | Status: DISCONTINUED | OUTPATIENT
Start: 2022-07-20 | End: 2022-07-20

## 2022-07-20 RX ORDER — CHOLECALCIFEROL (VITAMIN D3) 125 MCG
1 CAPSULE ORAL
Qty: 0 | Refills: 0 | DISCHARGE

## 2022-07-20 RX ORDER — METFORMIN HYDROCHLORIDE 850 MG/1
2 TABLET ORAL
Qty: 0 | Refills: 0 | DISCHARGE

## 2022-07-20 RX ORDER — IBUPROFEN 200 MG
1 TABLET ORAL
Qty: 0 | Refills: 0 | DISCHARGE

## 2022-07-20 RX ORDER — MULTIVIT-MIN/FERROUS GLUCONATE 9 MG/15 ML
1 LIQUID (ML) ORAL
Qty: 0 | Refills: 0 | DISCHARGE

## 2022-07-20 RX ORDER — ACETAMINOPHEN 500 MG
1 TABLET ORAL
Qty: 0 | Refills: 0 | DISCHARGE

## 2022-07-20 RX ORDER — ASPIRIN/CALCIUM CARB/MAGNESIUM 324 MG
1 TABLET ORAL
Qty: 0 | Refills: 0 | DISCHARGE

## 2022-07-20 RX ADMIN — OXYCODONE AND ACETAMINOPHEN 2 TABLET(S): 5; 325 TABLET ORAL at 16:48

## 2022-07-20 NOTE — ED PROVIDER NOTE - ATTENDING CONTRIBUTION TO CARE
Exam revealed older overweight white female reproducible tenderness to palpation inferior lateral most aspect of lower right lumbar region. I agree with plan and management outlined by PA.

## 2022-07-20 NOTE — ED PROVIDER NOTE - PATIENT PORTAL LINK FT
You can access the FollowMyHealth Patient Portal offered by [facility] by registering at the following website: [email or web address]. By joining [employer]’s FollowMyHealth portal, you will also be able to view your health information using other applications (apps) compatible with our system.

## 2022-07-20 NOTE — ED ADULT NURSE NOTE - CAS TRG GEN SKIN CONDITION
Patient: Bindu Forte    * No procedures listed *    Diagnosis:* No pre-op diagnosis entered *  Diagnosis Additional Information: No value filed.    Anesthesia Type:  No value filed.    Note:  Anesthesia Post Evaluation    Patient location during evaluation: PACU  Patient participation: Able to fully participate in evaluation  Level of consciousness: awake  Pain management: adequate  Airway patency: patent  Cardiovascular status: acceptable  Respiratory status: acceptable  Hydration status: euvolemic  PONV: controlled     Anesthetic complications: None    Comments: S/P epidural for labor. I or my partner remained immediately available, monitored the patient, and supervised nursing staff at key events and necessary intervals.    The patient is doing well. VSS Temp normal. Satisfactory cardiovascular and repiratory function. Neuro at baseline. Denies positional headache. Minimal side effects easily managed w/ PRN meds. No apparent anesthetic complications. No follow-up required.    [email or web address]        Last vitals:  Vitals:    08/29/19 0828 08/29/19 1600 08/30/19 0116   BP: 119/72 107/60 110/67   Pulse: 67 68 68   Resp: 18 18 16   Temp: 97.6  F (36.4  C) 98.2  F (36.8  C) 98.3  F (36.8  C)         Electronically Signed By: Bhavik Bowman MD  August 30, 2019  6:53 AM  
Warm/Dry

## 2022-07-20 NOTE — ED PROVIDER NOTE - CARE PROVIDER_API CALL
Tim Kerr)  Orthopaedic Surgery  [address] [place] [address], [address] [place] [address]  Phone: [phone]  Fax: [phone]  Follow Up Time: 1-3 Days

## 2022-07-20 NOTE — ED PROVIDER NOTE - PROGRESS NOTE DETAILS
pain improved with percocet but pt states too busy, requests flexeril and something milder for pain, will try tramadol

## 2022-07-20 NOTE — ED PROVIDER NOTE - OBJECTIVE STATEMENT
72yo F with PMH of T2DM, HTN, GERD, Fibromyalgia, Rhinosinusitis, Panhypopituitarism, lumbar stenosis, hyponatremia, OAp, resents to ED with lower back pain. C/o right sided LBP, radiates down to gluteal muscles and also to the front of the right thigh. Started 4 days ago. Stretching and walking make the pain worse. Walking elicits pain down the front of the right thigh. Applying heat relieves the pain momentarily. Laying flat and laying on the right side help with the pain. Tylenol 650mg and Motrin help with the pain minimally. Pain described as burning, twisting, throbbing. Denies bowel/bladder incontinence, numbness or decreased sensation in the pubic area, fever, chills, abdominal pain, nausea, vomiting, diarrhea.

## 2022-07-20 NOTE — ED PROVIDER NOTE - NSFOLLOWUPINSTRUCTIONS_ED_ALL_ED_FT
take tramadol 1 tab every 6 hours as needed for severe pain,  take flexeril as directed.  may use salonpas lidocaine patches as directed over the counter, apply to affected area.  moist heating pad,  return for leg weakness, incontinence,  call dr barr for orthopaedic follow up in 1-3 days.         [facility]                                                                                                                                Chronic Back Pain      When back pain lasts longer than 3 months, it is called chronic back pain. The cause of your back pain may not be known. Some common causes include:  •Wear and tear (degenerative disease) of the bones, ligaments, or disks in your back.      •Inflammation and stiffness in your back (arthritis).      People who have chronic back pain often go through certain periods in which the pain is more intense (flare-ups). Many people can learn to manage the pain with home care.      Follow these instructions at home:    Pay attention to any changes in your symptoms. Take these actions to help with your pain:      Managing pain and stiffness                 •If directed, apply ice to the painful area. Your health care provider may recommend applying ice during the first 24–48 hours after a flare-up begins. To do this:  •Put ice in a plastic bag.      •Place a towel between your skin and the bag.      •Leave the ice on for 20 minutes, 2–3 times per day.      •If directed, apply heat to the affected area as often as told by your health care provider. Use the heat source that your health care provider recommends, such as a moist heat pack or a heating pad.  •Place a towel between your skin and the heat source.      •Leave the heat on for 20–30 minutes.      •Remove the heat if your skin turns bright red. This is especially important if you are unable to feel pain, heat, or cold. You may have a greater risk of getting burned.        •Try soaking in a warm tub.        Activity      •Avoid bending and other activities that make the problem worse.    •Maintain a proper position when standing or sitting:  •When standing, keep your upper back and neck straight, with your shoulders pulled back. Avoid slouching.      •When sitting, keep your back straight and relax your shoulders. Do not round your shoulders or pull them backward.        • Do not sit or  one place for long periods of time.      •Take brief periods of rest throughout the day. This will reduce your pain. Resting in a lying or standing position is usually better than sitting to rest.      •When you are resting for longer periods, mix in some mild activity or stretching between periods of rest. This will help to prevent stiffness and pain.      •Get regular exercise. Ask your health care provider what activities are safe for you.    • Do not lift anything that is heavier than 10 lb (4.5 kg), or the limit that you are told, until your health care provider says that it is safe. Always use proper lifting technique, which includes:  •Bending your knees.      •Keeping the load close to your body.      •Avoiding twisting.        •Sleep on a firm mattress in a comfortable position. Try lying on your side with your knees slightly bent. If you lie on your back, put a pillow under your knees.      Medicines     •Treatment may include medicines for pain and inflammation taken by mouth or applied to the skin, prescription pain medicine, or muscle relaxants. Take over-the-counter and prescription medicines only as told by your health care provider.    •Ask your health care provider if the medicine prescribed to you:  •Requires you to avoid driving or using machinery.    •Can cause constipation. You may need to take these actions to prevent or treat constipation:  •Drink enough fluid to keep your urine pale yellow.      •Take over-the-counter or prescription medicines.      •Eat foods that are high in fiber, such as beans, whole grains, and fresh fruits and vegetables.      •Limit foods that are high in fat and processed sugars, such as fried or sweet foods.          General instructions     • Do not use any products that contain nicotine or tobacco, such as cigarettes, e-cigarettes, and chewing tobacco. If you need help quitting, ask your health care provider.      •Keep all follow-up visits as told by your health care provider. This is important.        Contact a health care provider if:    •You have pain that is not relieved with rest or medicine.      •Your pain gets worse, or you have new pain.      •You have a high fever.      •You have rapid weight loss.      •You have trouble doing your normal activities.        Get help right away if:    •You have weakness or numbness in one or both of your legs or feet.      •You have trouble controlling your bladder or your bowels.    •You have severe back pain and have any of the following:  •Nausea or vomiting.      •Pain in your abdomen.      •Shortness of breath or you faint.          Summary    •Chronic back pain is back pain that lasts longer than 3 months.      •When a flare-up begins, apply ice to the painful area for the first 24–48 hours.      •Apply a moist heat pad or use a heating pad on the painful area as directed by your health care provider.      •When you are resting for longer periods, mix in some mild activity or stretching between periods of rest. This will help to prevent stiffness and pain.      This information is not intended to replace advice given to you by your health care provider. Make sure you discuss any questions you have with your health care provider.      Document Revised: 01/27/2021 Document Reviewed: 01/27/2021    Elsevier Patient Education © 2022 [employer]

## 2022-07-20 NOTE — ED ADULT NURSE NOTE - OBJECTIVE STATEMENT
patient came in ED from home with c/o right lower back pain radiating to the right thigh X 4 days. patient denies recent  trauma or fall. patient noted ambulatory with steady gait. patient added she have lumbar stenosis that normally affects the left lower back. for the discomfort to the lower back patient has been taking Tylenol and Motrin.

## 2022-07-20 NOTE — ED PROVIDER NOTE - CLINICAL SUMMARY MEDICAL DECISION MAKING FREE TEXT BOX
Right sided low back pain radiating into right gluteal region requiring evaluation as well as imaging and medications. Of note, pain is positional, increased with movement and better with rest. No associated urinary symptoms. No weakness or paresthesia in lower extremities. No fever or chills. No bowel or bladder symptoms. Right sided low back pain radiating into right gluteal region requiring evaluation as well as imaging and medications. Of note, pain is positional, increased with movement and better with rest. No associated urinary symptoms. No weakness or paresthesia in lower extremities. No fever or chills. No bowel or bladder symptoms.  ct with diffuse djd, no fx,  neuro intact, d/c on tramadol, flexeril, salonpas, fu ortho spine

## 2022-08-02 ENCOUNTER — APPOINTMENT (OUTPATIENT)
Dept: CARDIOLOGY | Facility: CLINIC | Age: 72
End: 2022-08-02

## 2022-08-02 ENCOUNTER — NON-APPOINTMENT (OUTPATIENT)
Age: 72
End: 2022-08-02

## 2022-08-02 VITALS — SYSTOLIC BLOOD PRESSURE: 191 MMHG | HEART RATE: 75 BPM | DIASTOLIC BLOOD PRESSURE: 98 MMHG | OXYGEN SATURATION: 99 %

## 2022-08-02 DIAGNOSIS — M54.9 DORSALGIA, UNSPECIFIED: ICD-10-CM

## 2022-08-02 PROCEDURE — 99214 OFFICE O/P EST MOD 30 MIN: CPT | Mod: 25

## 2022-08-02 PROCEDURE — 93000 ELECTROCARDIOGRAM COMPLETE: CPT

## 2022-08-02 RX ORDER — CLINDAMYCIN HYDROCHLORIDE 300 MG/1
300 CAPSULE ORAL EVERY 6 HOURS
Qty: 28 | Refills: 0 | Status: DISCONTINUED | COMMUNITY
Start: 2020-06-26 | End: 2022-08-02

## 2022-08-03 ENCOUNTER — RESULT CHARGE (OUTPATIENT)
Age: 72
End: 2022-08-03

## 2022-08-06 NOTE — PHYSICAL EXAM
[Well Developed] : well developed [Well Nourished] : well nourished [No Acute Distress] : no acute distress [Normal Venous Pressure] : normal venous pressure [No Carotid Bruit] : no carotid bruit [Normal S1, S2] : normal S1, S2 [No Rub] : no rub [No Gallop] : no gallop [No Murmur] : no murmurs heard [Rt] : varicose veins of the right leg noted [Lt] : varicose veins of the left leg noted [No Abnormalities] : the abdominal aorta was not enlarged and no bruit was heard [Clear Lung Fields] : clear lung fields [Good Air Entry] : good air entry [No Respiratory Distress] : no respiratory distress  [Soft] : abdomen soft [Non Tender] : non-tender [No Masses/organomegaly] : no masses/organomegaly [Normal Bowel Sounds] : normal bowel sounds [Normal Gait] : normal gait [No Edema] : no edema [No Cyanosis] : no cyanosis [No Clubbing] : no clubbing [No Varicosities] : no varicosities [No Rash] : no rash [No Skin Lesions] : no skin lesions [Moves all extremities] : moves all extremities [No Focal Deficits] : no focal deficits [Normal Speech] : normal speech [Alert and Oriented] : alert and oriented [Normal memory] : normal memory [Well Groomed] : well groomed [General Appearance - In No Acute Distress] : no acute distress [Normal Conjunctiva] : the conjunctiva exhibited no abnormalities [Eyelids - No Xanthelasma] : the eyelids demonstrated no xanthelasmas [Normal Oral Mucosa] : normal oral mucosa [No Oral Pallor] : no oral pallor [No Oral Cyanosis] : no oral cyanosis [Normal Jugular Venous A Waves Present] : normal jugular venous A waves present [Normal Jugular Venous V Waves Present] : normal jugular venous V waves present [No Jugular Venous Hale A Waves] : no jugular venous hale A waves [Respiration, Rhythm And Depth] : normal respiratory rhythm and effort [Exaggerated Use Of Accessory Muscles For Inspiration] : no accessory muscle use [Auscultation Breath Sounds / Voice Sounds] : lungs were clear to auscultation bilaterally [Abdomen Soft] : soft [Abdomen Tenderness] : non-tender [Abdomen Mass (___ Cm)] : no abdominal mass palpated [Abnormal Walk] : normal gait [Gait - Sufficient For Exercise Testing] : the gait was sufficient for exercise testing [Nail Clubbing] : no clubbing of the fingernails [Cyanosis, Localized] : no localized cyanosis [Petechial Hemorrhages (___cm)] : no petechial hemorrhages [Skin Color & Pigmentation] : normal skin color and pigmentation [] : no rash [No Venous Stasis] : no venous stasis [Skin Lesions] : no skin lesions [No Skin Ulcers] : no skin ulcer [No Xanthoma] : no  xanthoma was observed [Oriented To Time, Place, And Person] : oriented to person, place, and time [Affect] : the affect was normal [Mood] : the mood was normal [No Anxiety] : not feeling anxious [Normal Rate] : normal [Rhythm Regular] : regular [Normal S1] : normal S1 [Normal S2] : normal S2 [Distant] : the heart sounds were distant [I] : a grade 1 [2+] : left 2+ [No Pitting Edema] : no pitting edema present [Right Carotid Bruit] : no bruit heard over the right carotid [Left Carotid Bruit] : no bruit heard over the left carotid [Bruit] : no bruit heard

## 2022-08-06 NOTE — DISCUSSION/SUMMARY
[FreeTextEntry1] : 71 year woman with a history as listed presents for a followup cardiac evaluation. \par \par Dulce Maria is very distressed about her blood pressure. she is in no acute distress.  She is euvolemic on exam.  She denies any anginal symptoms. Her last pharm nuclear was normal. \par Her blood pressure is still elevated.\par  I do believe that her blood pressure is elevated in the response to her current steroid taper as well as acute pain.\par She did not tolerate the Aldactone. She did not tolerates CCBs secondary to lower extremity edema.  She is only on HCTZ as PRN for lower extremity edema (historically becomes hypoNa).  She did not tolerate the Doxazosin.\par  For now, She will increase clonidine patch to 0.3mg patch weekly and I will add epleronone 25mg daily. In addition , she will continue coreg 25mg BID, hydralazine 100mg TID. I have advised that she keep a blood pressure log at home for the next month.  she will check it twice a day during this time. \par I will have her check BMP prior to the next visit .\par I have also advised she have the Ortho spine physician give me a call during her upcoming office visit regarding management.  I have no objections to her starting epidurals as needed.\par \par Secodary stroke management.  She is still pending her Zio. she will continue ASA 81, Crestor 10mg.  Goal LDL of <70.  \par Her MRI reveal infarcts in multiple vascular territories. I have offered her a ILR implantation. She states that she will think about it still. She is not convinced. She wants to hold off for now.  \par \par Follow up in one month

## 2022-08-06 NOTE — CARDIOLOGY SUMMARY
[de-identified] : 6/2019 pharm nuc neg spect \par 2/1/22 pharm nuc neg spect  [de-identified] : No ECG today \par \par  6/6/22-Sinus  Rhythm \par -Right bundle branch block with left axis -bifascicular block. \par  Voltage criteria for LVH  [de-identified] : 5/2019 normal systolic LV function with mild LVH\par 7/2021 Normal LV function. mild diastolic dysfunction. \par

## 2022-08-06 NOTE — HISTORY OF PRESENT ILLNESS
[FreeTextEntry1] : 71 year old woman with a history of obesity, hypertension, fibromyalgia. She presented to  with hypertensive emergency with a CVA. Her MRI was positive for CVA in different distributions. \par \par Previous visit History:\par She presents for a followup. \par Since her last visit, she has been more stressed given his family's healthy issues. \par \par Otherwise she has been doing relatively well. She is complaining of more aching in her legs. She feels that it is from her varicose veins. She intermittently will get cramping in her calves at night which coconut water helps with. \par \par She has been having more palpitations daily, last for minutes, self limiting, occurring randomly. It has been happening for last 3 weeks. \par \par Her palpitations have resolved after changing the brand of coffee she was using. \par \par She  denies any chest pain, PND, orthopnea, lower extremity edema, near syncope, syncope, strokelike symptoms. She has been more sedentary given the cold weather. Her HERNÁNDEZ has remained stable. \par \par She did not switch to the clonidine patch. Her GI issues have resolved. Medication reconciliation performed. She is complaint with her medications.\par  \par \par Ms Vu presents today 6/29/22 for follow up after adding Clonidine Patch to her regimen.\par \par she does not check her blood pressures at home, however, she has had follow up with her PCP and hematologist, both times she states readings were 130s/70s.  She has been consistent with the patch thus far, placing every Sunday.  she has not yet placed the ZIO patch for monitor, she will put it on today. \par \par she had follow up with vascular today regarding varicose veins.   \par \par She denies dizzy spells. She is ambulating with a cane as needed.  She denies chest pains or pressures, denies palpitations. \par  \par \par Previous visit History as above:\par Ms Vu presents today 8/2/22 for post hospitalization visit.\par She has been hospitalized most recently on 7/20/22 with R lower back pain that radiated down her front leg.  Leaving her unable to ambulate due to pain.  series of Scan performed during hospital stay. she has multilevel severe degenerative osteoporosis.\par She has since been under the care of orthopedic spine (Dr. Tim Smith) , was told she has bursitis of the right HIP.  she has been undergoing steroid injections as well as oral taper. \par \par She has been so concerned because her blood pressure has been as high as 190s/100 despite current regimen.\par \par she ustually walks with a cane but is in a wheel chair today.\par  \par  \par \par  \par \par

## 2022-08-06 NOTE — END OF VISIT
[FreeTextEntry3] : I saw and evaluated the patient and discussed the care with the NP provider above on 08/02/2022 . I agree with the findings and plan as documented in the note above. bp affected by severe pain. will titrate meds as above. awaiting pain management.

## 2022-08-23 ENCOUNTER — APPOINTMENT (OUTPATIENT)
Dept: INTERNAL MEDICINE | Facility: CLINIC | Age: 72
End: 2022-08-23

## 2022-08-23 ENCOUNTER — LABORATORY RESULT (OUTPATIENT)
Age: 72
End: 2022-08-23

## 2022-08-23 VITALS
BODY MASS INDEX: 44.16 KG/M2 | DIASTOLIC BLOOD PRESSURE: 98 MMHG | WEIGHT: 240 LBS | SYSTOLIC BLOOD PRESSURE: 170 MMHG | HEIGHT: 62 IN | TEMPERATURE: 96.5 F | OXYGEN SATURATION: 98 % | RESPIRATION RATE: 18 BRPM | HEART RATE: 84 BPM

## 2022-08-23 VITALS — SYSTOLIC BLOOD PRESSURE: 178 MMHG | DIASTOLIC BLOOD PRESSURE: 90 MMHG

## 2022-08-23 DIAGNOSIS — F41.1 GENERALIZED ANXIETY DISORDER: ICD-10-CM

## 2022-08-23 PROCEDURE — 99214 OFFICE O/P EST MOD 30 MIN: CPT

## 2022-08-23 RX ORDER — METHYLPREDNISOLONE 4 MG/1
4 TABLET ORAL
Qty: 21 | Refills: 0 | Status: DISCONTINUED | COMMUNITY
Start: 2022-07-27 | End: 2022-08-23

## 2022-08-23 RX ORDER — CYCLOBENZAPRINE HYDROCHLORIDE 10 MG/1
10 TABLET, FILM COATED ORAL
Qty: 15 | Refills: 0 | Status: DISCONTINUED | COMMUNITY
Start: 2022-07-20 | End: 2022-08-23

## 2022-08-23 RX ORDER — FAMOTIDINE 20 MG/1
20 TABLET, FILM COATED ORAL
Refills: 0 | Status: ACTIVE | COMMUNITY

## 2022-08-23 RX ORDER — CYCLOBENZAPRINE HYDROCHLORIDE 5 MG/1
5 TABLET, FILM COATED ORAL TWICE DAILY
Qty: 30 | Refills: 0 | Status: DISCONTINUED | COMMUNITY
Start: 2020-12-16 | End: 2022-08-23

## 2022-08-23 RX ORDER — CLONIDINE 0.2 MG/24H
0.2 PATCH, EXTENDED RELEASE TRANSDERMAL WEEKLY
Qty: 7 | Refills: 0 | Status: DISCONTINUED | COMMUNITY
Start: 2022-01-18 | End: 2022-08-23

## 2022-08-24 NOTE — HISTORY OF PRESENT ILLNESS
[FreeTextEntry8] : Here for follow up\par Pt reports that she has received a lumbar epidural injection on 8/5/22.\par Also received a cortisone injection to right hip on 7/20/22 along with a medrol dosepacl\par Requests labs. Requests a new glucometer.

## 2022-08-24 NOTE — PLAN
[FreeTextEntry1] : Discussed elevated BP with Cardiology. Will increase eplerenone to 25mg daily instead of every other day.\par Check labs.\par RTO 1 week to repeat BP\par Follow up with Cardiology

## 2022-08-25 LAB
25(OH)D3 SERPL-MCNC: 40.4 NG/ML
ALBUMIN SERPL ELPH-MCNC: 4.4 G/DL
ALP BLD-CCNC: 70 U/L
ALT SERPL-CCNC: 25 U/L
ANION GAP SERPL CALC-SCNC: 14 MMOL/L
APPEARANCE: CLEAR
AST SERPL-CCNC: 16 U/L
BASOPHILS # BLD AUTO: 0.05 K/UL
BASOPHILS NFR BLD AUTO: 0.8 %
BILIRUB SERPL-MCNC: 0.4 MG/DL
BILIRUBIN URINE: NEGATIVE
BLOOD URINE: NEGATIVE
BUN SERPL-MCNC: 16 MG/DL
CALCIUM SERPL-MCNC: 9.7 MG/DL
CHLORIDE SERPL-SCNC: 97 MMOL/L
CHOLEST SERPL-MCNC: 131 MG/DL
CO2 SERPL-SCNC: 22 MMOL/L
COLOR: YELLOW
CREAT SERPL-MCNC: 0.81 MG/DL
EGFR: 78 ML/MIN/1.73M2
EOSINOPHIL # BLD AUTO: 0.14 K/UL
EOSINOPHIL NFR BLD AUTO: 2.3 %
ESTIMATED AVERAGE GLUCOSE: 183 MG/DL
FOLATE SERPL-MCNC: >20 NG/ML
GLUCOSE QUALITATIVE U: NEGATIVE
GLUCOSE SERPL-MCNC: 220 MG/DL
HBA1C MFR BLD HPLC: 8 %
HCT VFR BLD CALC: 35.4 %
HDLC SERPL-MCNC: 52 MG/DL
HGB BLD-MCNC: 11.9 G/DL
IMM GRANULOCYTES NFR BLD AUTO: 0.7 %
KETONES URINE: NEGATIVE
LDLC SERPL CALC-MCNC: 39 MG/DL
LEUKOCYTE ESTERASE URINE: ABNORMAL
LYMPHOCYTES # BLD AUTO: 1.18 K/UL
LYMPHOCYTES NFR BLD AUTO: 19.2 %
MAN DIFF?: NORMAL
MCHC RBC-ENTMCNC: 30.1 PG
MCHC RBC-ENTMCNC: 33.6 GM/DL
MCV RBC AUTO: 89.6 FL
MONOCYTES # BLD AUTO: 0.61 K/UL
MONOCYTES NFR BLD AUTO: 9.9 %
NEUTROPHILS # BLD AUTO: 4.13 K/UL
NEUTROPHILS NFR BLD AUTO: 67.1 %
NITRITE URINE: NEGATIVE
NONHDLC SERPL-MCNC: 78 MG/DL
PH URINE: 6
PLATELET # BLD AUTO: 280 K/UL
POTASSIUM SERPL-SCNC: 4.1 MMOL/L
PROT SERPL-MCNC: 6.2 G/DL
PROTEIN URINE: NORMAL
RBC # BLD: 3.95 M/UL
RBC # FLD: 12.8 %
SODIUM SERPL-SCNC: 134 MMOL/L
SPECIFIC GRAVITY URINE: 1.02
TRIGL SERPL-MCNC: 195 MG/DL
TSH SERPL-ACNC: 0.56 UIU/ML
UROBILINOGEN URINE: NORMAL
VIT B12 SERPL-MCNC: 556 PG/ML
WBC # FLD AUTO: 6.15 K/UL

## 2022-08-30 ENCOUNTER — RX CHANGE (OUTPATIENT)
Age: 72
End: 2022-08-30

## 2022-08-30 ENCOUNTER — APPOINTMENT (OUTPATIENT)
Dept: INTERNAL MEDICINE | Facility: CLINIC | Age: 72
End: 2022-08-30

## 2022-08-30 VITALS
TEMPERATURE: 96.7 F | HEART RATE: 78 BPM | DIASTOLIC BLOOD PRESSURE: 90 MMHG | OXYGEN SATURATION: 97 % | RESPIRATION RATE: 18 BRPM | SYSTOLIC BLOOD PRESSURE: 148 MMHG

## 2022-08-30 VITALS — DIASTOLIC BLOOD PRESSURE: 84 MMHG | SYSTOLIC BLOOD PRESSURE: 146 MMHG

## 2022-08-30 PROCEDURE — 99213 OFFICE O/P EST LOW 20 MIN: CPT

## 2022-08-30 NOTE — HISTORY OF PRESENT ILLNESS
[FreeTextEntry1] : Here for follow-up\par eplerenone was increased to 25 mg daily instead of every other day at he last visit [de-identified] : feeling well

## 2022-09-01 ENCOUNTER — RX CHANGE (OUTPATIENT)
Age: 72
End: 2022-09-01

## 2022-09-07 RX ORDER — BLOOD SUGAR DIAGNOSTIC
STRIP MISCELLANEOUS 4 TIMES DAILY
Qty: 100 | Refills: 3 | Status: DISCONTINUED | COMMUNITY
Start: 2022-08-23 | End: 2022-09-07

## 2022-09-07 RX ORDER — BLOOD-GLUCOSE METER
W/DEVICE EACH MISCELLANEOUS
Qty: 1 | Refills: 0 | Status: DISCONTINUED | COMMUNITY
Start: 2022-08-23 | End: 2022-09-07

## 2022-09-12 ENCOUNTER — RESULT CHARGE (OUTPATIENT)
Age: 72
End: 2022-09-12

## 2022-09-13 ENCOUNTER — APPOINTMENT (OUTPATIENT)
Dept: CARDIOLOGY | Facility: CLINIC | Age: 72
End: 2022-09-13

## 2022-09-13 ENCOUNTER — NON-APPOINTMENT (OUTPATIENT)
Age: 72
End: 2022-09-13

## 2022-09-13 VITALS — HEART RATE: 78 BPM | HEIGHT: 62 IN | OXYGEN SATURATION: 97 %

## 2022-09-13 VITALS — SYSTOLIC BLOOD PRESSURE: 150 MMHG | DIASTOLIC BLOOD PRESSURE: 88 MMHG

## 2022-09-13 PROCEDURE — 93000 ELECTROCARDIOGRAM COMPLETE: CPT

## 2022-09-13 PROCEDURE — 99214 OFFICE O/P EST MOD 30 MIN: CPT | Mod: 25

## 2022-09-15 NOTE — REASON FOR VISIT
[Hypertension] : hypertension [FreeTextEntry1] : Previous visit History:\par She presents for a followup. \par Since her last visit, she has been more stressed given his family's healthy issues. \par \par Otherwise she has been doing relatively well. She is complaining of more aching in her legs. She feels that it is from her varicose veins. She intermittently will get cramping in her calves at night which coconut water helps with. \par \par She has been having more palpitations daily, last for minutes, self limiting, occurring randomly. It has been happening for last 3 weeks. \par \par Her palpitations have resolved after changing the brand of coffee she was using. \par \par She  denies any chest pain, PND, orthopnea, lower extremity edema, near syncope, syncope, strokelike symptoms. She has been more sedentary given the cold weather. Her HERNÁNDEZ has remained stable. \par \par She did not switch to the clonidine patch. Her GI issues have resolved. Medication reconciliation performed. She is complaint with her medications.\par  \par \par Ms Vu presents today 6/29/22 for follow up after adding Clonidine Patch to her regimen.\par \par she does not check her blood pressures at home, however, she has had follow up with her PCP and hematologist, both times she states readings were 130s/70s.  She has been consistent with the patch thus far, placing every Sunday.  she has not yet placed the ZIO patch for monitor, she will put it on today. \par \par she had follow up with vascular today regarding varicose veins.   \par \par She denies dizzy spells. She is ambulating with a cane as needed.  She denies chest pains or pressures, denies palpitations. \par  \par Ms Vu presents today 8/2/22 for post hospitalization visit.\par She has been hospitalized most recently on 7/20/22 with R lower back pain that radiated down her front leg.  Leaving her unable to ambulate due to pain.  series of Scan performed during hospital stay. she has multilevel severe degenerative osteoporosis.\par She has since been under the care of orthopedic spine (Dr. Tim Smith) , was told she has bursitis of the right HIP.  she has been undergoing steroid injections as well as oral taper. \par \par She has been so concerned because her blood pressure has been as high as 190s/100 despite current regimen.\par \par she usually walks with a cane but is in a wheel chair today.

## 2022-09-15 NOTE — CARDIOLOGY SUMMARY
[de-identified] :  8/13/22-Sinus  Rhythm \par -Right bundle branch block with left axis -bifascicular block. \par  Voltage criteria for LVH  [de-identified] : 6/2019 pharm nuc neg spect \par 2/1/22 pharm nuc neg spect  [de-identified] : 5/2019 normal systolic LV function with mild LVH\par 7/2021 Normal LV function. mild diastolic dysfunction. \par

## 2022-09-15 NOTE — DISCUSSION/SUMMARY
[FreeTextEntry1] : 71 year woman with a history as listed presents for a followup cardiac evaluation. \par \par  She is in no acute distress.  She is euvolemic on exam.  She denies any anginal symptoms. Her last pharm nuclear was normal. \par Her blood pressure has improved since last visit but still not optimized.  She will increase eplerenone to 50 mg daily.\par She did not tolerate the Aldactone. She did not tolerates CCBs secondary to lower extremity edema.  She is only on HCTZ as PRN for lower extremity edema (historically becomes hypoNa).  She did not tolerate the Doxazosin.\par  For now, She will continue clonidine patch to 0.3mg patch weekly, coreg 25mg BID, hydralazine 100mg TID and enalapril 20 mg twice a day. \par  I have advised that she keep a blood pressure log at home for the next month.  she will check it twice a day during this time. \par I will have her check BMP prior to the next visit .\par \par Secondary stroke management.  She is still pending her Zio. she will continue ASA 81, Crestor 10mg.  Goal LDL of <70.  \par Her MRI reveal infarcts in multiple vascular territories. I have offered her a ILR implantation. She states that she will think about it still. She is not convinced. She wants to hold off for now.  \par \par Follow up in November, sooner if questions or concerns arise. [EKG obtained to assist in diagnosis and management of assessed problem(s)] : EKG obtained to assist in diagnosis and management of assessed problem(s)

## 2022-09-15 NOTE — HISTORY OF PRESENT ILLNESS
[FreeTextEntry1] : 71 year old woman with a history of obesity, hypertension, fibromyalgia. She presented to  with hypertensive emergency with a CVA. Her MRI was positive for CVA in different distributions. \par \par She presents back today for routine blood pressure check.\par She is feeling significantly better since last visit.\par NOw ambulating with a cane, no using the wheel chair to get around anymore. \par She is s/p epidural x2 on 8/5/22.  \par She is off oral steroids.\par She has started physical therapy a few times a week.\par She denies chest pains or pressure. She  denies dizzy spells, feeling faint or fainting, She   denies palpitations or difficulty breathing while laying flat. She denies lower extremity swelling.\par \par She has not been checking her blood pressure at home however at other doctor visits she states her blood pressures have improved.\par \par She is due back for follow-up with pain management in October.\par \par  \par \par

## 2022-09-22 ENCOUNTER — RX RENEWAL (OUTPATIENT)
Age: 72
End: 2022-09-22

## 2022-09-26 ENCOUNTER — RX RENEWAL (OUTPATIENT)
Age: 72
End: 2022-09-26

## 2022-10-24 ENCOUNTER — RX RENEWAL (OUTPATIENT)
Age: 72
End: 2022-10-24

## 2022-10-28 ENCOUNTER — RX RENEWAL (OUTPATIENT)
Age: 72
End: 2022-10-28

## 2022-11-08 ENCOUNTER — APPOINTMENT (OUTPATIENT)
Dept: CARDIOLOGY | Facility: CLINIC | Age: 72
End: 2022-11-08

## 2022-11-09 ENCOUNTER — APPOINTMENT (OUTPATIENT)
Dept: VASCULAR SURGERY | Facility: CLINIC | Age: 72
End: 2022-11-09

## 2022-12-06 ENCOUNTER — RESULT CHARGE (OUTPATIENT)
Age: 72
End: 2022-12-06

## 2022-12-07 ENCOUNTER — APPOINTMENT (OUTPATIENT)
Dept: CARDIOLOGY | Facility: CLINIC | Age: 72
End: 2022-12-07

## 2022-12-07 ENCOUNTER — NON-APPOINTMENT (OUTPATIENT)
Age: 72
End: 2022-12-07

## 2022-12-07 VITALS — DIASTOLIC BLOOD PRESSURE: 90 MMHG | SYSTOLIC BLOOD PRESSURE: 160 MMHG

## 2022-12-07 VITALS
HEIGHT: 62 IN | HEART RATE: 79 BPM | OXYGEN SATURATION: 97 % | DIASTOLIC BLOOD PRESSURE: 120 MMHG | BODY MASS INDEX: 45.08 KG/M2 | WEIGHT: 245 LBS | SYSTOLIC BLOOD PRESSURE: 191 MMHG

## 2022-12-07 PROCEDURE — 93000 ELECTROCARDIOGRAM COMPLETE: CPT

## 2022-12-07 PROCEDURE — 99214 OFFICE O/P EST MOD 30 MIN: CPT | Mod: 25

## 2022-12-07 RX ORDER — EPLERENONE 25 MG/1
25 TABLET, COATED ORAL
Qty: 90 | Refills: 0 | Status: DISCONTINUED | COMMUNITY
Start: 2022-08-23

## 2022-12-08 NOTE — REVIEW OF SYSTEMS
[Joint Pain] : joint pain [Joint Stiffness] : joint stiffness [Numbness (Hypoesthesia)] : numbness [Negative] : Heme/Lymph [FreeTextEntry9] : back pain

## 2022-12-08 NOTE — END OF VISIT
[FreeTextEntry3] : I saw and evaluated the patient and discussed the care with the NP provider above on 12/07/2022 . I agree with the findings and plan as documented in the note above. will start minoxidil for bp later this month. She will try to maintain a BP log at home. Reducing dietary salt intake advised.

## 2022-12-08 NOTE — DISCUSSION/SUMMARY
[FreeTextEntry1] : 72 year woman with a history as listed presents for a followup cardiac evaluation. \par \par  She is in no acute distress.  She is euvolemic on exam.  She denies any anginal symptoms. Her last pharm nuclear was normal. \par Her blood pressure still not optimized, refractory to a multi drug regimen.  In the past, She did not tolerate CCBs secondary to lower extremity edema.  She is only on HCTZ as PRN for lower extremity edema (historically becomes hypoNa).  She did not tolerate Doxazosin. She did not tolerate the Aldactone.\par She has agreed to try adding minoxidil 2.5mg daily to the regimen. She will continue eplerenone 50 mg daily, clonidine patch to 0.3mg patch weekly, coreg 25mg BID, hydralazine 100mg TID and enalapril 20 mg twice a day. \par  I have advised that she keep a blood pressure log at home for the next month.  she will check it twice a day during this time. \par \par Secondary stroke management.  most recent ZIO from 7/2022 demonstrated a few episodes of PAT, AVG HR 72.  she will continue ASA 81, Crestor 10mg.  Goal LDL of <70, most recent was 49\par \par Her MRI reveal infarcts in multiple vascular territories. I have offered her a ILR implantation. She states that she will think about it still. She is not convinced. She wants to hold off for now.  \par \par She will follow up in 3 months, sooner if questions or concerns arise.

## 2022-12-08 NOTE — CARDIOLOGY SUMMARY
[de-identified] : 12/7/22-Sinus  Rhythm \par -Right bundle branch block with left axis -bifascicular block. \par  Voltage criteria for LVH  [de-identified] : RENATA 7/2022\par AVG HR 72\par few PAT [de-identified] : 6/2019 pharm nuc neg spect \par 2/1/22 pharm nuc neg spect  [de-identified] : 5/2019 normal systolic LV function with mild LVH\par 7/2021 Normal LV function. mild diastolic dysfunction. \par

## 2022-12-08 NOTE — HISTORY OF PRESENT ILLNESS
[FreeTextEntry1] : 72 year old woman with a history of obesity, hypertension, fibromyalgia, lumbar stenosis/herniated disc. She has a history of hypertensive emergency with a CVA. Her MRI was positive for CVA in different distributions. \par SHe has been seen undergoing several pain management therapies due to chronic pain that includes oral steroid tapers , other anti inflammatory regimens such as motrin/ibuprofen and Epidurals.  \par \par She presents back today for routine blood pressure check.\par \par Now ambulating with a cane, she really only uses it for support from time to time. n\par She is s/p epidural x2 on 8/5/22.  She is off oral steroids.\par She had another acute pain episode in early November, lasting for about 12 days.  She increased her frequency of advil for about 2 weeks, now she has cut down significantly.  she was given Tramadol/tylenol to assist with pain management.  She is now scheduled for repeat Epidural on 12/16/22.\par \par She denies chest pains or pressure. She  denies dizzy spells, feeling faint or fainting, She   denies palpitations or difficulty breathing while laying flat. She denies lower extremity swelling.\par She has started physical therapy a few times a week.\par \par \par \par \par  \par \par

## 2022-12-10 ENCOUNTER — RX RENEWAL (OUTPATIENT)
Age: 72
End: 2022-12-10

## 2022-12-21 ENCOUNTER — RX RENEWAL (OUTPATIENT)
Age: 72
End: 2022-12-21

## 2023-01-29 ENCOUNTER — RX RENEWAL (OUTPATIENT)
Age: 73
End: 2023-01-29

## 2023-03-12 ENCOUNTER — RX RENEWAL (OUTPATIENT)
Age: 73
End: 2023-03-12

## 2023-03-28 ENCOUNTER — RX RENEWAL (OUTPATIENT)
Age: 73
End: 2023-03-28

## 2023-03-31 ENCOUNTER — APPOINTMENT (OUTPATIENT)
Dept: INTERNAL MEDICINE | Facility: CLINIC | Age: 73
End: 2023-03-31
Payer: MEDICARE

## 2023-03-31 VITALS
DIASTOLIC BLOOD PRESSURE: 100 MMHG | HEIGHT: 62 IN | WEIGHT: 245 LBS | BODY MASS INDEX: 45.08 KG/M2 | RESPIRATION RATE: 18 BRPM | HEART RATE: 41 BPM | SYSTOLIC BLOOD PRESSURE: 170 MMHG | OXYGEN SATURATION: 95 %

## 2023-03-31 VITALS — SYSTOLIC BLOOD PRESSURE: 162 MMHG | DIASTOLIC BLOOD PRESSURE: 96 MMHG

## 2023-03-31 DIAGNOSIS — E11.9 TYPE 2 DIABETES MELLITUS W/OUT COMPLICATIONS: ICD-10-CM

## 2023-03-31 DIAGNOSIS — I10 ESSENTIAL (PRIMARY) HYPERTENSION: ICD-10-CM

## 2023-03-31 DIAGNOSIS — D64.9 ANEMIA, UNSPECIFIED: ICD-10-CM

## 2023-03-31 DIAGNOSIS — M79.7 FIBROMYALGIA: ICD-10-CM

## 2023-03-31 PROCEDURE — 99214 OFFICE O/P EST MOD 30 MIN: CPT

## 2023-03-31 RX ORDER — ASPIRIN 81 MG/1
81 TABLET ORAL
Refills: 0 | Status: ACTIVE | COMMUNITY
Start: 2017-03-23

## 2023-03-31 NOTE — HISTORY OF PRESENT ILLNESS
[No Pertinent Cardiac History] : no history of aortic stenosis, atrial fibrillation, coronary artery disease, recent myocardial infarction, or implantable device/pacemaker [No Adverse Anesthesia Reaction] : no adverse anesthesia reaction in self or family member [Diabetes] : diabetes [Asthma] : no asthma [COPD] : no COPD [Chronic Anticoagulation] : no chronic anticoagulation [Chronic Kidney Disease] : no chronic kidney disease [FreeTextEntry1] : bilateral cataracts [FreeTextEntry2] : 4/17 & 4/24/23 [FreeTextEntry3] : Dr Diallo [FreeTextEntry4] : Pt is here for preop evaluation. Has h/o HTN, DM, hyperlipidemia, hypothyroid, GERD, anxiety. Takes occasional xanax especially before her eye injections.\par \par Her BP tends to be high in the office.  \par

## 2023-03-31 NOTE — ASSESSMENT
[Patient Optimized for Surgery] : Patient optimized for surgery [FreeTextEntry4] : DM \par on metformin\par will check labs\par \par hyperlipidemia\par crestor\par \par HTN\par not well controlled\par pt states that she is nervous in the office\par she is on multiple medications\par \par anxiety\par alprazolam renewed\par \par GERD\par famotidine and lansoprazole \par \par hypothyroid continue levothyroxine\par \par pt has an upcoming cardiology appt

## 2023-04-06 ENCOUNTER — NON-APPOINTMENT (OUTPATIENT)
Age: 73
End: 2023-04-06

## 2023-04-06 ENCOUNTER — APPOINTMENT (OUTPATIENT)
Dept: CARDIOLOGY | Facility: CLINIC | Age: 73
End: 2023-04-06
Payer: MEDICARE

## 2023-04-06 VITALS
OXYGEN SATURATION: 96 % | HEART RATE: 85 BPM | BODY MASS INDEX: 44.16 KG/M2 | WEIGHT: 240 LBS | DIASTOLIC BLOOD PRESSURE: 80 MMHG | HEIGHT: 62 IN | SYSTOLIC BLOOD PRESSURE: 164 MMHG

## 2023-04-06 DIAGNOSIS — R94.31 ABNORMAL ELECTROCARDIOGRAM [ECG] [EKG]: ICD-10-CM

## 2023-04-06 PROCEDURE — 93000 ELECTROCARDIOGRAM COMPLETE: CPT

## 2023-04-06 PROCEDURE — 99214 OFFICE O/P EST MOD 30 MIN: CPT | Mod: 25

## 2023-04-06 NOTE — HISTORY OF PRESENT ILLNESS
[FreeTextEntry1] : 72 year old woman with a history of obesity, hypertension, fibromyalgia, lumbar stenosis/herniated disc. She has a history of hypertensive emergency with a CVA. Her MRI was positive for CVA in different distributions. \par SHe has been seen undergoing several pain management therapies due to chronic pain that includes oral steroid tapers , other anti inflammatory regimens such as motrin/ibuprofen and Epidurals.  \par \par Since her last visit she has been getting epidurals for her back pain. Now ambulating with a cane, she really only uses it for support from time to time.\par She is now planning for a cataract extraction with lens replacement. \par \par She denies chest pains or pressure. She  denies dizzy spells, feeling faint or fainting, She   denies palpitations or difficulty breathing while laying flat. She denies lower extremity swelling. She has been trying to control her anxiety. Medication reconciliation performed. She is compliant with her medications. \par \par \par \par \par  \par \par

## 2023-04-06 NOTE — DISCUSSION/SUMMARY
[FreeTextEntry1] : 72 year woman with a history as listed presents for a followup cardiac evaluation. \par \par Dulce Maria is doing relatively well  She is euvolemic on exam.  She denies any anginal symptoms. Her EKG is unchanged from previous. \par \par Her blood pressure still not optimized, refractory to a multi drug regimen.  In the past, She did not tolerate CCBs secondary to lower extremity edema.  She is only on HCTZ as PRN for lower extremity edema (historically becomes hypoNa).  She did not tolerate Doxazosin. She did not tolerate the Aldactone.\par She will increase minoxidil to 5mg daily to the regimen. She will continue eplerenone 50 mg daily, clonidine patch to 0.3mg patch weekly, coreg 25mg BID, hydralazine 100mg TID and enalapril 20 mg twice a day. \par \par Secondary stroke management.  most recent ZIO from 7/2022 demonstrated a few episodes of PAT, AVG HR 72.  she will continue ASA 81, Crestor 10mg.  Goal LDL of <70, most recent was 49\par \par Her MRI reveal infarcts in multiple vascular territories. I have offered her a ILR implantation. She states that she will think about it still. She is not convinced. She wants to hold off for now.  \par \par  She currently has no active cardiac conditions. She may proceed with the planned low to intermediate risk cataract surgery without any further cardiac workup. Routine hemodynamic monitoring is suggested during the procedure.  .\par She will follow up in 3 months, sooner if questions or concerns arise. [EKG obtained to assist in diagnosis and management of assessed problem(s)] : EKG obtained to assist in diagnosis and management of assessed problem(s)

## 2023-04-06 NOTE — CARDIOLOGY SUMMARY
[de-identified] : 4/6/23 Sinus  Rhythm \par -Right bundle branch block with left axis -bifascicular block.  [de-identified] : RENATA 7/2022 AVG HR 72 few PAT [de-identified] : 6/2019 pharm nuc neg spect \par 2/1/22 pharm nuc neg spect  [de-identified] : 5/2019 normal systolic LV function with mild LVH\par 7/2021 Normal LV function. mild diastolic dysfunction. \par

## 2023-04-06 NOTE — PHYSICAL EXAM
[Well Developed] : well developed [Well Nourished] : well nourished [No Acute Distress] : no acute distress [Normal Venous Pressure] : normal venous pressure [No Carotid Bruit] : no carotid bruit [Normal S1, S2] : normal S1, S2 [No Rub] : no rub [No Gallop] : no gallop [No Murmur] : no murmurs heard [Rt] : varicose veins of the right leg noted [Lt] : varicose veins of the left leg noted [No Abnormalities] : the abdominal aorta was not enlarged and no bruit was heard [Clear Lung Fields] : clear lung fields [Good Air Entry] : good air entry [No Respiratory Distress] : no respiratory distress  [Soft] : abdomen soft [Non Tender] : non-tender [No Masses/organomegaly] : no masses/organomegaly [Normal Bowel Sounds] : normal bowel sounds [Normal Gait] : normal gait [No Edema] : no edema [No Cyanosis] : no cyanosis [No Clubbing] : no clubbing [No Varicosities] : no varicosities [No Rash] : no rash [No Skin Lesions] : no skin lesions [Moves all extremities] : moves all extremities [No Focal Deficits] : no focal deficits [Normal Speech] : normal speech [Alert and Oriented] : alert and oriented [Normal memory] : normal memory [Well Groomed] : well groomed [General Appearance - In No Acute Distress] : no acute distress [Normal Conjunctiva] : the conjunctiva exhibited no abnormalities [Eyelids - No Xanthelasma] : the eyelids demonstrated no xanthelasmas [Normal Oral Mucosa] : normal oral mucosa [No Oral Pallor] : no oral pallor [No Oral Cyanosis] : no oral cyanosis [Normal Jugular Venous A Waves Present] : normal jugular venous A waves present [Normal Jugular Venous V Waves Present] : normal jugular venous V waves present [No Jugular Venous Hale A Waves] : no jugular venous hale A waves [Respiration, Rhythm And Depth] : normal respiratory rhythm and effort [Exaggerated Use Of Accessory Muscles For Inspiration] : no accessory muscle use [Auscultation Breath Sounds / Voice Sounds] : lungs were clear to auscultation bilaterally [Abdomen Soft] : soft [Abdomen Tenderness] : non-tender [Abdomen Mass (___ Cm)] : no abdominal mass palpated [Abnormal Walk] : normal gait [Gait - Sufficient For Exercise Testing] : the gait was sufficient for exercise testing [Nail Clubbing] : no clubbing of the fingernails [Cyanosis, Localized] : no localized cyanosis [Petechial Hemorrhages (___cm)] : no petechial hemorrhages [Skin Color & Pigmentation] : normal skin color and pigmentation [] : no rash [No Venous Stasis] : no venous stasis [Skin Lesions] : no skin lesions [No Skin Ulcers] : no skin ulcer [No Xanthoma] : no  xanthoma was observed [Oriented To Time, Place, And Person] : oriented to person, place, and time [Affect] : the affect was normal [Mood] : the mood was normal [No Anxiety] : not feeling anxious [Normal Rate] : normal [Rhythm Regular] : regular [Normal S1] : normal S1 [Normal S2] : normal S2 [Distant] : the heart sounds were distant [I] : a grade 1 [2+] : left 2+ [Right Carotid Bruit] : no bruit heard over the right carotid [Left Carotid Bruit] : no bruit heard over the left carotid [Bruit] : no bruit heard [No Pitting Edema] : no pitting edema present

## 2023-04-11 ENCOUNTER — TRANSCRIPTION ENCOUNTER (OUTPATIENT)
Age: 73
End: 2023-04-11

## 2023-05-05 ENCOUNTER — RX RENEWAL (OUTPATIENT)
Age: 73
End: 2023-05-05

## 2023-05-30 ENCOUNTER — RX RENEWAL (OUTPATIENT)
Age: 73
End: 2023-05-30

## 2023-06-22 ENCOUNTER — NON-APPOINTMENT (OUTPATIENT)
Age: 73
End: 2023-06-22

## 2023-06-22 ENCOUNTER — APPOINTMENT (OUTPATIENT)
Dept: CARDIOLOGY | Facility: CLINIC | Age: 73
End: 2023-06-22
Payer: MEDICARE

## 2023-06-22 VITALS — DIASTOLIC BLOOD PRESSURE: 70 MMHG | SYSTOLIC BLOOD PRESSURE: 120 MMHG

## 2023-06-22 VITALS
HEART RATE: 87 BPM | BODY MASS INDEX: 46.01 KG/M2 | SYSTOLIC BLOOD PRESSURE: 149 MMHG | OXYGEN SATURATION: 99 % | DIASTOLIC BLOOD PRESSURE: 80 MMHG | HEIGHT: 62 IN | WEIGHT: 250 LBS

## 2023-06-22 DIAGNOSIS — I10 ESSENTIAL (PRIMARY) HYPERTENSION: ICD-10-CM

## 2023-06-22 PROCEDURE — 93000 ELECTROCARDIOGRAM COMPLETE: CPT

## 2023-06-22 PROCEDURE — 99214 OFFICE O/P EST MOD 30 MIN: CPT | Mod: 25

## 2023-06-22 NOTE — CARDIOLOGY SUMMARY
[de-identified] : 6/22/23 Sinus  Rhythm \par -Incomplete right bundle branch block and left axis -anterior fascicular block. \par  -Old anteroseptal infarct.  [de-identified] : RENATA 7/2022 AVG HR 72 few PAT [de-identified] : 6/2019 pharm nuc neg spect \par 2/1/22 pharm nuc neg spect  [de-identified] : 5/2019 normal systolic LV function with mild LVH\par 7/2021 Normal LV function. mild diastolic dysfunction. \par

## 2023-06-22 NOTE — DISCUSSION/SUMMARY
[FreeTextEntry1] : 72 year woman with a history as listed presents for a followup cardiac evaluation. \par \par Dulce Maria is doing relatively well  She is euvolemic on exam.  She denies any anginal symptoms. Her EKG is unchanged from previous. \par \par Her blood pressure still not optimized, but has improved. In the past, She did not tolerate CCBs secondary to lower extremity edema.  She is only on HCTZ as PRN for lower extremity edema (historically becomes hypoNa).  She did not tolerate Doxazosin. She did not tolerate the Aldactone.\par She will continue minoxidil  5mg daily to the regimen. She will continue eplerenone 50 mg daily, clonidine patch to 0.3mg patch weekly, coreg 25mg BID, hydralazine 100mg TID and enalapril 20 mg twice a day. \par \par Secondary stroke management.  most recent ZIO from 7/2022 demonstrated a few episodes of PAT, AVG HR 72.  she will continue ASA 81, Crestor 10mg.  Goal LDL of <70, most recent was 49\par \par Her MRI reveal infarcts in multiple vascular territories. I have offered her a ILR implantation. She states that she will think about it still. She is not convinced. She wants to hold off for now.  \par  \par She will need to talk to ortho spine about her surgical options. \par She will follow up in 3 months, sooner if questions or concerns arise. [EKG obtained to assist in diagnosis and management of assessed problem(s)] : EKG obtained to assist in diagnosis and management of assessed problem(s)

## 2023-06-22 NOTE — HISTORY OF PRESENT ILLNESS
[FreeTextEntry1] : 72 year old woman with a history of obesity, hypertension, fibromyalgia, lumbar stenosis/herniated disc. She has a history of hypertensive emergency with a CVA. Her MRI was positive for CVA in different distributions. \par SHe has been seen undergoing several pain management therapies due to chronic pain that includes oral steroid tapers , other anti inflammatory regimens such as motrin/ibuprofen and Epidurals.  \par \par Since her last visit she is having worsening back pain. She is pending another epidural. \par  \par She denies chest pains or pressure. She  denies dizzy spells, feeling faint or fainting, She   denies palpitations or difficulty breathing while laying flat. She denies lower extremity swelling. She has been trying to control her anxiety. Medication reconciliation performed. She is compliant with her medications. \par \par \par \par \par  \par \par

## 2023-06-23 NOTE — ED PROVIDER NOTE - CHPI ED SYMPTOMS NEG
[FreeTextEntry1] : No postop problems \par Return PRN no dizziness/no loss of consciousness/no nausea/no vomiting/no change in level of consciousness

## 2023-07-03 ENCOUNTER — RX RENEWAL (OUTPATIENT)
Age: 73
End: 2023-07-03

## 2023-07-03 ENCOUNTER — TRANSCRIPTION ENCOUNTER (OUTPATIENT)
Age: 73
End: 2023-07-03

## 2023-08-08 ENCOUNTER — RX RENEWAL (OUTPATIENT)
Age: 73
End: 2023-08-08

## 2023-09-15 ENCOUNTER — RX RENEWAL (OUTPATIENT)
Age: 73
End: 2023-09-15

## 2023-09-20 ENCOUNTER — APPOINTMENT (OUTPATIENT)
Dept: CARDIOLOGY | Facility: CLINIC | Age: 73
End: 2023-09-20
Payer: MEDICARE

## 2023-09-20 VITALS
DIASTOLIC BLOOD PRESSURE: 78 MMHG | WEIGHT: 253 LBS | HEART RATE: 94 BPM | BODY MASS INDEX: 46.56 KG/M2 | HEIGHT: 62 IN | SYSTOLIC BLOOD PRESSURE: 183 MMHG | OXYGEN SATURATION: 96 %

## 2023-09-20 PROCEDURE — 93000 ELECTROCARDIOGRAM COMPLETE: CPT

## 2023-09-20 PROCEDURE — 99214 OFFICE O/P EST MOD 30 MIN: CPT | Mod: 25

## 2023-09-20 RX ORDER — FUROSEMIDE 40 MG/1
40 TABLET ORAL
Qty: 7 | Refills: 0 | Status: ACTIVE | COMMUNITY
Start: 2023-09-20 | End: 1900-01-01

## 2023-10-01 ENCOUNTER — RX RENEWAL (OUTPATIENT)
Age: 73
End: 2023-10-01

## 2023-10-01 PROBLEM — I63.81 LACUNAR INFARCTION: Status: ACTIVE | Noted: 2017-03-23

## 2023-11-11 ENCOUNTER — RX RENEWAL (OUTPATIENT)
Age: 73
End: 2023-11-11

## 2023-11-27 RX ORDER — EPLERENONE 50 MG/1
50 TABLET, COATED ORAL
Qty: 90 | Refills: 3 | Status: ACTIVE | COMMUNITY
Start: 2022-08-02

## 2023-11-27 NOTE — ED PROVIDER NOTE - CARE PLAN
Called pt. Back and spoke with wife, pt. Started taking antibiotics but instructed pt. Wife to stop taking current therapy and [facility] will be sending new prescription to pharmacy today, pt.  Wife verbalized understanding 1 Principal Discharge DX:	Hypertensive urgency

## 2023-11-30 ENCOUNTER — RX RENEWAL (OUTPATIENT)
Age: 73
End: 2023-11-30

## 2023-12-01 NOTE — ED ADULT TRIAGE NOTE - MODE OF ARRIVAL
Patient with IR Drain(s) in place: 10 Fr Left Back Soft Tissue drainage Catheter Dr Morales on 11/29/23        Reason for drain placement: Left back seroma      Drain Placement Date 11/29/23   Last Intervention/Date 11/29/23   Last CT Date 10/24/23   Output (ml)/24 hrs 30 mls/24hrs   Output Color/Consistency Light red to clear   Is flushing ordered? If so how many ml/day? no   Is flushing subtracted from output (ml)/24hrs? n/a   Does the drain flush easily? n/a   Insertion Site Drainage none      Other pertinent info: patient contact made regarding IR drain.   Questions as to whether or not he would have his drain on the day of the sinogram, education given on sinogram.     Sinogram scheduled for 12/6/23     Preferred site:[facility]     Routed to [facility] for continued communication     Private Vehicle

## 2023-12-18 ENCOUNTER — RX RENEWAL (OUTPATIENT)
Age: 73
End: 2023-12-18

## 2023-12-21 ENCOUNTER — RX RENEWAL (OUTPATIENT)
Age: 73
End: 2023-12-21

## 2024-02-05 ENCOUNTER — RX RENEWAL (OUTPATIENT)
Age: 74
End: 2024-02-05

## 2024-02-05 RX ORDER — CARVEDILOL 25 MG/1
25 TABLET, FILM COATED ORAL
Qty: 180 | Refills: 2 | Status: ACTIVE | COMMUNITY
Start: 2017-08-29

## 2024-02-06 RX ORDER — HYDRALAZINE HYDROCHLORIDE 100 MG/1
100 TABLET ORAL
Qty: 270 | Refills: 3 | Status: ACTIVE | COMMUNITY
Start: 2017-05-17

## 2024-02-21 ENCOUNTER — RX RENEWAL (OUTPATIENT)
Age: 74
End: 2024-02-21

## 2024-04-03 ENCOUNTER — APPOINTMENT (OUTPATIENT)
Dept: ENDOCRINOLOGY | Facility: CLINIC | Age: 74
End: 2024-04-03
Payer: MEDICARE

## 2024-04-03 VITALS
OXYGEN SATURATION: 96 % | HEART RATE: 87 BPM | SYSTOLIC BLOOD PRESSURE: 189 MMHG | WEIGHT: 242 LBS | DIASTOLIC BLOOD PRESSURE: 84 MMHG | BODY MASS INDEX: 44.53 KG/M2 | HEIGHT: 62 IN

## 2024-04-03 DIAGNOSIS — I10 ESSENTIAL (PRIMARY) HYPERTENSION: ICD-10-CM

## 2024-04-03 DIAGNOSIS — E23.6 OTHER DISORDERS OF PITUITARY GLAND: ICD-10-CM

## 2024-04-03 DIAGNOSIS — E78.5 HYPERLIPIDEMIA, UNSPECIFIED: ICD-10-CM

## 2024-04-03 DIAGNOSIS — E03.9 HYPOTHYROIDISM, UNSPECIFIED: ICD-10-CM

## 2024-04-03 DIAGNOSIS — E11.65 TYPE 2 DIABETES MELLITUS WITH HYPERGLYCEMIA: ICD-10-CM

## 2024-04-03 DIAGNOSIS — E66.9 OBESITY, UNSPECIFIED: ICD-10-CM

## 2024-04-03 LAB — HBA1C MFR BLD HPLC: 7.6

## 2024-04-03 PROCEDURE — 83036 HEMOGLOBIN GLYCOSYLATED A1C: CPT | Mod: QW

## 2024-04-03 PROCEDURE — 99204 OFFICE O/P NEW MOD 45 MIN: CPT

## 2024-04-03 RX ORDER — BLOOD-GLUCOSE METER
W/DEVICE EACH MISCELLANEOUS
Qty: 1 | Refills: 0 | Status: ACTIVE | COMMUNITY
Start: 2022-09-07 | End: 1900-01-01

## 2024-04-03 RX ORDER — LANCETS 30 GAUGE
EACH MISCELLANEOUS
Qty: 4 | Refills: 3 | Status: ACTIVE | COMMUNITY
Start: 2022-09-07 | End: 1900-01-01

## 2024-04-03 RX ORDER — BLOOD SUGAR DIAGNOSTIC
STRIP MISCELLANEOUS
Qty: 300 | Refills: 1 | Status: ACTIVE | COMMUNITY
Start: 2022-09-07 | End: 1900-01-01

## 2024-04-03 NOTE — PHYSICAL EXAM
[Alert] : alert [Well Nourished] : well nourished [Obese] : obese [No Acute Distress] : no acute distress [No Proptosis] : no proptosis [No Respiratory Distress] : no respiratory distress [No Accessory Muscle Use] : no accessory muscle use [Normal Rate and Effort] : normal respiratory rate and effort [Clear to Auscultation] : lungs were clear to auscultation bilaterally [Normal S1, S2] : normal S1 and S2 [Normal Rate] : heart rate was normal [Regular Rhythm] : with a regular rhythm [No Edema] : no peripheral edema [Normal Bowel Sounds] : normal bowel sounds [Not Tender] : non-tender [Not Distended] : not distended [Soft] : abdomen soft [No Involuntary Movements] : no involuntary movements were seen [Oriented x3] : oriented to person, place, and time [Normal Affect] : the affect was normal [Normal Mood] : the mood was normal [Normal Hearing] : hearing was normal [de-identified] : +cushingoid appearance [Acanthosis Nigricans] : no acanthosis nigricans [de-identified] : unable to fully assess due to body habitus

## 2024-04-03 NOTE — REVIEW OF SYSTEMS
[Fatigue] : fatigue [Blurred Vision] : blurred vision [SOB on Exertion] : shortness of breath on exertion [Joint Pain] : joint pain [Anxiety] : anxiety [All other systems negative] : All other systems negative [Recent Weight Loss (___ Lbs)] : no recent weight loss [Recent Weight Gain (___ Lbs)] : no recent weight gain [Chest Pain] : no chest pain [Dysphonia] : no dysphonia [Dysphagia] : no dysphagia [Palpitations] : no palpitations [Nausea] : no nausea [Vomiting] : no vomiting [FreeTextEntry2] : +difficulty losing weight [Polyuria] : no polyuria [FreeTextEntry3] : +cataract and macular degeneration  [FreeTextEntry8] : +Menopausal

## 2024-04-03 NOTE — REASON FOR VISIT
[Initial Evaluation] : an initial evaluation [DM Type 2] : DM Type 2 [Hypothyroidism] : hypothyroidism [Pituitary Evaluation/ Disorder] : pituitary evaluation/disorder [FreeTextEntry2] : Dr. Adrianna Dubose

## 2024-04-03 NOTE — ASSESSMENT
[Carbohydrate Consistent Diet] : carbohydrate consistent diet [Long Term Vascular Complications] : long term vascular complications of diabetes [Importance of Diet and Exercise] : importance of diet and exercise to improve glycemic control, achieve weight loss and improve cardiovascular health [Self Monitoring of Blood Glucose] : self monitoring of blood glucose [Retinopathy Screening] : Patient was referred to ophthalmology for retinopathy screening [FreeTextEntry1] : 72 y/o F w/  1. 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 This appears to now be resolved with hx of nondiagnostic MRI and off prednisone, but clinically appeared to have iatrogenic Cushing's from long term high dose steroids now getting epidural steroid injections. Has history of negative cushing's screen with midnight salivary cortisol level when she was off steroids. Normal DXA 8/2014. Awaiting repeat DXA (script to be given by PCP along with mamogram).   2. Hypothyroidism- remains on levothyroxine 50 mcg daily. Check TFTs.  3. Type 2 DM-  A1c increased from recent epidural injections likely. Goal A1c <7%. Continue with metformin to 2000 mg daily with dietary and lifestyle modifications and now add Mounjaro 2.5mg weekly for additional glycemic and weight loss benefits. Can increase as tolerated. Discussed common side effects such as nausea, vomiting, abdominal pain, GERD, and constipation. Reviewed rare but potential risk of pancreatitis, pancreatic cancer, and medullary thyroid cancer.   4. HTN- BP above goal, c/w current regimen of enalapril, hydralazine, and coreg for now. Adjust as needed   5. Hyperlipidemia- c/w statin  6. Obesity- Trial of Mounjaro. Additional dietary and lifestyle modifications discussed.

## 2024-04-03 NOTE — DATA REVIEWED
[FreeTextEntry1] : Labs 4/3/2024: A1c 7.6%  Labs 8/2022: CBC normal A1c 8%  Chol 131 HDL 52 LDL 39 CMP normal except sodium of 134 and glucose of 220 Vit D 40.4 TSH 0.56  Labs 1/2021: CMP normal except sodium of 132 and glucose of 152 TSH 0.55 Lipid Profile normal A1c 6.8% Vit D 51.5  Labs 1/14/20: A1c 6.7%  Labs 6/19/19: A1c 6.6%  Labs 5/2019: TSH 0.94 Free T4 1.5 LDL 44 HDL 55 Chol 123 Trig 119  Labs 9/2018: A1c 6.9% TSH 0.91 Free T4 1.7 Micro/Cr neg. LDL 41 HDL 57 Chol 124 Trig 131 CMP normal except glucose of 126 Vit D 43  Labs 1/2018: A1c 7.4% Micro/Cr 21 Cortisol 7.5 ACTH 30  12/2017: CBC normal CMP normal except glucose of 143  Labs 11/2017: LDL 57 HDL 60 Chol 141 Trig 120  Labs 5/1/17: A1c 7.2% CBC normal CMP normal except glucose of 142 TSH 1.30 Free T4 1.5 Vit D 43.1  Labs 7/23/16: CBC normal CMP normal except glucose of 155  HDL 41 Chol 208 Trig 281 A1c 7.7% TSH 1.52 Free T4 1.4 ACTH 69 Cortisol 14.3  Labs 9/19/15: ACTH 46 DHEAS 23.3 Cortisol 13.4  HDL 45 Chol 192 Trig 203 CMP normal except glucose of 141 CBC normal A1c 7.1% ESR 30  MRI 5/2015: No evidence of pituitary adenoma  Labs 1/2015: TSH 0.29 Free T4 1.6 AM Cortisol 1.4 FSH 21 LH 7.0 Prolactin 11.5 IGF-1 138  DXA 8/2014: Normal

## 2024-04-03 NOTE — CONSULT LETTER
[Consult Letter:] : I had the pleasure of evaluating your patient, [unfilled]. [Dear  ___] : Dear  [unfilled], [Sincerely,] : Sincerely, [Consult Closing:] : Thank you very much for allowing me to participate in the care of this patient.  If you have any questions, please do not hesitate to contact me. [Please see my note below.] : Please see my note below. [FreeTextEntry3] : Alex Chiu DO Division of Endocr[facility]  of Medicine [employer] [facility] [employer] Director of [facility] [FreeTextEntry2] : Dr. Adrianna Dubose [address] [place] [id number]

## 2024-04-03 NOTE — HISTORY OF PRESENT ILLNESS
[FreeTextEntry1] : 74 y/o F diagnosed with pre-diabetes in 2010, made some dietary and lifestyle changes. Started to see Dr. Annie Hutson started on metformin 1000 mg daily in 2011 with improvement in A1c and weight loss. Plan was to taper off metformin. In 2012 started to feel "sick" had diarrhea, nausea and vomiting, persistent hyponatremia with multiple admission. Had generalized pain and muscle weakness. Started to develop cold intolerance (when she typically was warm). Had thyroid function tests which were consistent with secondary hypothyroidism. Due to persistent symptoms was admitted to [facility] found to have low cortisol. 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 Started on prednisone 40 mg daily 4/2014 tapered until 8/2015. Felt better initially on steroids. Glucose values started to increase. She was started on Lantus and Humalog. Gained 35 lb. on the steroids still difficulty losing weight. Diagnosed with venous insufficiency by vascular around 2020. Cortisol level  checked off prednisone at 7 from 13.  Seen initially by me 11/2015 recommended repeat MRI which was nondiagnostic. Screened for Cushing's given cushingoid features off steroids and elevated ACTH level which was normal. Was on levothyroxine 50 mcg daily. Chemically euthyroid as of 8/2022. Had stopped Synthroid in 3/2019 until 5/2019 when she noticed increased fatigue and difficulty losing weight. Now back on 50 mcg daily. +occasional polydipsia at night without nocturia or polyuria. Has Accu-Chek glucometer but does not check glucose. No known hypoglycemia. No known complications from diabetes.  Tries to monitor carbs. +occasional fatigue with lack of energy, "legs feel tired", no nausea or vomiting, has diarrhea occasionally now improved after starting statin. Last optho 2024. s/p cataract surgery 2023. Getting Ilyea for macular degeneration. Had received steroid injections 5/2016 for trigger finger. +multiple epidural injections in the spine last 3/2024 for sciatica. A1c increased to 8% after multiple epidural spine injections. Remains on metformin 1000mg BID. Interested in starting incretin therapy for additional weight loss benefits.

## 2024-04-04 LAB
25(OH)D3 SERPL-MCNC: 39.9 NG/ML
ALBUMIN SERPL ELPH-MCNC: 4.5 G/DL
ALP BLD-CCNC: 83 U/L
ALT SERPL-CCNC: 12 U/L
ANION GAP SERPL CALC-SCNC: 14 MMOL/L
AST SERPL-CCNC: 13 U/L
BASOPHILS # BLD AUTO: 0.06 K/UL
BASOPHILS NFR BLD AUTO: 0.8 %
BILIRUB SERPL-MCNC: 0.3 MG/DL
BUN SERPL-MCNC: 37 MG/DL
CALCIUM SERPL-MCNC: 9.3 MG/DL
CALCIUM SERPL-MCNC: 9.3 MG/DL
CHLORIDE SERPL-SCNC: 104 MMOL/L
CHOLEST SERPL-MCNC: 142 MG/DL
CO2 SERPL-SCNC: 20 MMOL/L
CREAT SERPL-MCNC: 1.21 MG/DL
CREAT SPEC-SCNC: 69 MG/DL
EGFR: 47 ML/MIN/1.73M2
EOSINOPHIL # BLD AUTO: 0.17 K/UL
EOSINOPHIL NFR BLD AUTO: 2.2 %
ESTRADIOL SERPL-MCNC: <5 PG/ML
FSH SERPL-MCNC: 24.6 IU/L
GLUCOSE SERPL-MCNC: 166 MG/DL
HCT VFR BLD CALC: 31.4 %
HDLC SERPL-MCNC: 55 MG/DL
HGB BLD-MCNC: 10.4 G/DL
IGF-1 INTERP: NORMAL
IGF-I BLD-MCNC: 102 NG/ML
IMM GRANULOCYTES NFR BLD AUTO: 0.6 %
LDLC SERPL CALC-MCNC: 60 MG/DL
LH SERPL-ACNC: 13.9 IU/L
LYMPHOCYTES # BLD AUTO: 1.01 K/UL
LYMPHOCYTES NFR BLD AUTO: 13.1 %
MAN DIFF?: NORMAL
MCHC RBC-ENTMCNC: 30.6 PG
MCHC RBC-ENTMCNC: 33.1 GM/DL
MCV RBC AUTO: 92.4 FL
MICROALBUMIN 24H UR DL<=1MG/L-MCNC: 1.7 MG/DL
MICROALBUMIN/CREAT 24H UR-RTO: 25 MG/G
MONOCYTES # BLD AUTO: 0.51 K/UL
MONOCYTES NFR BLD AUTO: 6.6 %
NEUTROPHILS # BLD AUTO: 5.9 K/UL
NEUTROPHILS NFR BLD AUTO: 76.7 %
NONHDLC SERPL-MCNC: 87 MG/DL
PARATHYROID HORMONE INTACT: 41 PG/ML
PLATELET # BLD AUTO: 252 K/UL
POTASSIUM SERPL-SCNC: 5.8 MMOL/L
PROLACTIN SERPL-MCNC: 12.3 NG/ML
PROT SERPL-MCNC: 6.7 G/DL
RBC # BLD: 3.4 M/UL
RBC # FLD: 13.1 %
SODIUM SERPL-SCNC: 138 MMOL/L
T4 FREE SERPL-MCNC: 1.6 NG/DL
TRIGL SERPL-MCNC: 165 MG/DL
TSH SERPL-ACNC: 0.54 UIU/ML
WBC # FLD AUTO: 7.7 K/UL

## 2024-04-09 LAB
MONOMERIC PROLACTIN (ICMA)*: 13.1 NG/ML
PERCENT MACROPROLACTIN: 14 %
PROLACTIN, SERUM (ICMA)*: 15.2 NG/ML

## 2024-04-22 ENCOUNTER — RX RENEWAL (OUTPATIENT)
Age: 74
End: 2024-04-22

## 2024-04-24 ENCOUNTER — RX RENEWAL (OUTPATIENT)
Age: 74
End: 2024-04-24

## 2024-04-24 RX ORDER — CLONIDINE 0.3 MG/24H
0.3 PATCH, EXTENDED RELEASE TRANSDERMAL
Qty: 12 | Refills: 2 | Status: ACTIVE | COMMUNITY
Start: 2022-08-02 | End: 1900-01-01

## 2024-04-30 RX ORDER — MINOXIDIL 2.5 MG/1
2.5 TABLET ORAL
Qty: 180 | Refills: 2 | Status: ACTIVE | COMMUNITY
Start: 2022-12-07

## 2024-05-10 ENCOUNTER — APPOINTMENT (OUTPATIENT)
Dept: CARDIOLOGY | Facility: CLINIC | Age: 74
End: 2024-05-10

## 2024-05-14 ENCOUNTER — RX RENEWAL (OUTPATIENT)
Age: 74
End: 2024-05-14

## 2024-05-23 RX ORDER — ROSUVASTATIN CALCIUM 10 MG/1
10 TABLET, FILM COATED ORAL
Qty: 90 | Refills: 0 | Status: ACTIVE | COMMUNITY
Start: 2017-04-07 | End: 1900-01-01

## 2024-05-31 ENCOUNTER — RX RENEWAL (OUTPATIENT)
Age: 74
End: 2024-05-31

## 2024-06-04 ENCOUNTER — RX RENEWAL (OUTPATIENT)
Age: 74
End: 2024-06-04

## 2024-06-19 RX ORDER — TIRZEPATIDE 5 MG/.5ML
5 INJECTION, SOLUTION SUBCUTANEOUS
Qty: 4 | Refills: 3 | Status: ACTIVE | COMMUNITY
Start: 2024-04-03 | End: 1900-01-01

## 2024-07-18 ENCOUNTER — APPOINTMENT (OUTPATIENT)
Dept: CARDIOLOGY | Facility: CLINIC | Age: 74
End: 2024-07-18

## 2024-07-22 ENCOUNTER — APPOINTMENT (OUTPATIENT)
Dept: INTERNAL MEDICINE | Facility: CLINIC | Age: 74
End: 2024-07-22
Payer: MEDICARE

## 2024-07-22 VITALS
SYSTOLIC BLOOD PRESSURE: 122 MMHG | DIASTOLIC BLOOD PRESSURE: 64 MMHG | TEMPERATURE: 97.9 F | HEIGHT: 62 IN | OXYGEN SATURATION: 99 % | RESPIRATION RATE: 18 BRPM | WEIGHT: 224 LBS | HEART RATE: 91 BPM | BODY MASS INDEX: 41.22 KG/M2

## 2024-07-22 DIAGNOSIS — E03.9 HYPOTHYROIDISM, UNSPECIFIED: ICD-10-CM

## 2024-07-22 DIAGNOSIS — E66.9 OBESITY, UNSPECIFIED: ICD-10-CM

## 2024-07-22 DIAGNOSIS — D64.9 ANEMIA, UNSPECIFIED: ICD-10-CM

## 2024-07-22 DIAGNOSIS — Z13.820 ENCOUNTER FOR SCREENING FOR OSTEOPOROSIS: ICD-10-CM

## 2024-07-22 DIAGNOSIS — J30.9 ALLERGIC RHINITIS, UNSPECIFIED: ICD-10-CM

## 2024-07-22 DIAGNOSIS — E11.9 TYPE 2 DIABETES MELLITUS W/OUT COMPLICATIONS: ICD-10-CM

## 2024-07-22 DIAGNOSIS — E78.5 HYPERLIPIDEMIA, UNSPECIFIED: ICD-10-CM

## 2024-07-22 PROCEDURE — 99214 OFFICE O/P EST MOD 30 MIN: CPT

## 2024-07-22 PROCEDURE — G2211 COMPLEX E/M VISIT ADD ON: CPT

## 2024-07-22 NOTE — ASSESSMENT
[FreeTextEntry1] : hyperlipidemia continue rosuvastatin  DM continue Mounjaro and metformin follow up with Dr Chiu  HTN continue carvedilol, clonidine, enalapril, eplerenone, hctz, hydralazine  hypothyroid  continue levothyroxine  anemia follow up with Dr Durbin colonoscopy done 2019  uses a cane to walk

## 2024-07-22 NOTE — HISTORY OF PRESENT ILLNESS
[FreeTextEntry1] : DM and other medical conditions [de-identified] : Pt is feeling better. She lost 25 lbs on Mounjaro.

## 2024-07-22 NOTE — HEALTH RISK ASSESSMENT
[Yes] : Yes [Monthly or less (1 pt)] : Monthly or less (1 point) [1 or 2 (0 pts)] : 1 or 2 (0 points) [Little interest or pleasure doing things] : 1) Little interest or pleasure doing things [Feeling down, depressed, or hopeless] : 2) Feeling down, depressed, or hopeless [0] : 2) Feeling down, depressed, or hopeless: Not at all (0) [PHQ-2 Negative - No further assessment needed] : PHQ-2 Negative - No further assessment needed [[id number]] : 0 [Former] : Former [15-19] : 15-19 [> 15 Years] : > 15 Years [de-identified] : quit 2008

## 2024-08-12 ENCOUNTER — RX RENEWAL (OUTPATIENT)
Age: 74
End: 2024-08-12

## 2024-08-15 ENCOUNTER — APPOINTMENT (OUTPATIENT)
Dept: CARDIOLOGY | Facility: CLINIC | Age: 74
End: 2024-08-15
Payer: MEDICARE

## 2024-08-15 ENCOUNTER — NON-APPOINTMENT (OUTPATIENT)
Age: 74
End: 2024-08-15

## 2024-08-15 VITALS
HEIGHT: 62 IN | OXYGEN SATURATION: 98 % | BODY MASS INDEX: 41.04 KG/M2 | DIASTOLIC BLOOD PRESSURE: 73 MMHG | WEIGHT: 223 LBS | SYSTOLIC BLOOD PRESSURE: 121 MMHG | HEART RATE: 92 BPM

## 2024-08-15 DIAGNOSIS — E78.5 HYPERLIPIDEMIA, UNSPECIFIED: ICD-10-CM

## 2024-08-15 DIAGNOSIS — I10 ESSENTIAL (PRIMARY) HYPERTENSION: ICD-10-CM

## 2024-08-15 PROCEDURE — 93000 ELECTROCARDIOGRAM COMPLETE: CPT

## 2024-08-15 PROCEDURE — 99214 OFFICE O/P EST MOD 30 MIN: CPT

## 2024-08-15 PROCEDURE — G2211 COMPLEX E/M VISIT ADD ON: CPT

## 2024-08-15 NOTE — DISCUSSION/SUMMARY
[FreeTextEntry1] : 73 year woman with a history as listed presents for a followup cardiac evaluation.   Dulce Maria is doing relatively well  She is euvolemic on exam.  She denies any anginal symptoms. Her EKG is unchanged from previous.   Her blood pressure has greatly improved after the weight loss from [facility] GLP1. Will need to monitor for hypotension if continues to lose. he did not tolerate CCBs secondary to lower extremity edema.  She is only on HCTZ as PRN for lower extremity edema (historically becomes hypoNa).  She did not tolerate Doxazosin. She did not tolerate the Aldactone. She will continue minoxidil  5mg daily to the regimen. She will continue eplerenone 50 mg daily, clonidine patch to 0.3mg patch weekly, coreg 25mg BID, hydralazine 100mg TID and enalapril 20 mg twice a day.  Secondary stroke management.  most recent ZIO from 7/2022 demonstrated a few episodes of PAT, AVG HR 72.  she will continue ASA 81, Crestor 10mg.  Goal LDL of <70   Her MRI reveal infarcts in multiple vascular territories. I have offered her a ILR implantation. She states that she will think about it still. She is not convinced. She wants to hold off for now.     Exercise and diet counseling was performed in order to reduce her future cardiovascular risk.  She will follow up in 6 months, sooner if questions or concerns arise. [EKG obtained to assist in diagnosis and management of assessed problem(s)] : EKG obtained to assist in diagnosis and management of assessed problem(s)

## 2024-08-15 NOTE — CARDIOLOGY SUMMARY
[de-identified] : Sinus Rhythm  -Right bundle branch block.and LAFB [de-identified] : RENATA 7/2022 AVG HR 72 few PAT [de-identified] : 6/2019 pharm nuc neg spect \par  2/1/22 pharm nuc neg spect  [de-identified] : 5/2019 normal systolic LV function with mild LVH\par  7/2021 Normal LV function. mild diastolic dysfunction. \par

## 2024-08-15 NOTE — PHYSICAL EXAM
[Well Developed] : well developed [Well Nourished] : well nourished [No Acute Distress] : no acute distress [Normal Venous Pressure] : normal venous pressure [No Carotid Bruit] : no carotid bruit [Normal S1, S2] : normal S1, S2 [No Rub] : no rub [No Gallop] : no gallop [No Murmur] : no murmurs heard [No Pitting Edema] : no pitting edema present [No Abnormalities] : the abdominal aorta was not enlarged and no bruit was heard [Clear Lung Fields] : clear lung fields [Good Air Entry] : good air entry [No Respiratory Distress] : no respiratory distress  [Soft] : abdomen soft [Non Tender] : non-tender [No Masses/organomegaly] : no masses/organomegaly [Normal Bowel Sounds] : normal bowel sounds [Normal Gait] : normal gait [No Edema] : no edema [No Cyanosis] : no cyanosis [No Clubbing] : no clubbing [No Varicosities] : no varicosities [No Rash] : no rash [No Skin Lesions] : no skin lesions [Moves all extremities] : moves all extremities [No Focal Deficits] : no focal deficits [Normal Speech] : normal speech [Alert and Oriented] : alert and oriented [Normal memory] : normal memory [Well Groomed] : well groomed [General Appearance - In No Acute Distress] : no acute distress [Normal Conjunctiva] : the conjunctiva exhibited no abnormalities [Eyelids - No Xanthelasma] : the eyelids demonstrated no xanthelasmas [Normal Oral Mucosa] : normal oral mucosa [No Oral Pallor] : no oral pallor [No Oral Cyanosis] : no oral cyanosis [Normal Jugular Venous A Waves Present] : normal jugular venous A waves present [Normal Jugular Venous V Waves Present] : normal jugular venous V waves present [No Jugular Venous Hale A Waves] : no jugular venous hale A waves [Respiration, Rhythm And Depth] : normal respiratory rhythm and effort [Auscultation Breath Sounds / Voice Sounds] : lungs were clear to auscultation bilaterally [Exaggerated Use Of Accessory Muscles For Inspiration] : no accessory muscle use [Abdomen Soft] : soft [Abdomen Tenderness] : non-tender [Abdomen Mass (___ Cm)] : no abdominal mass palpated [Abnormal Walk] : normal gait [Gait - Sufficient For Exercise Testing] : the gait was sufficient for exercise testing [Nail Clubbing] : no clubbing of the fingernails [Cyanosis, Localized] : no localized cyanosis [Petechial Hemorrhages (___cm)] : no petechial hemorrhages [Skin Color & Pigmentation] : normal skin color and pigmentation [] : no rash [No Venous Stasis] : no venous stasis [Skin Lesions] : no skin lesions [No Skin Ulcers] : no skin ulcer [No Xanthoma] : no  xanthoma was observed [Oriented To Time, Place, And Person] : oriented to person, place, and time [Affect] : the affect was normal [Mood] : the mood was normal [No Anxiety] : not feeling anxious [Normal Rate] : normal [Rhythm Regular] : regular [Normal S1] : normal S1 [Normal S2] : normal S2 [Distant] : the heart sounds were distant [I] : a grade 1 [2+] : left 2+ [Right Carotid Bruit] : no bruit heard over the right carotid [Left Carotid Bruit] : no bruit heard over the left carotid [Bruit] : no bruit heard [___ +] : bilateral [unfilled]U+ pretibial pitting edema [Rt] : varicose veins of the right leg noted [Lt] : varicose veins of the left leg noted

## 2024-08-15 NOTE — HISTORY OF PRESENT ILLNESS
[FreeTextEntry1] : 72 year old woman with a history of obesity, hypertension, fibromyalgia, lumbar stenosis/herniated disc. She has a history of hypertensive emergency with a CVA. Her MRI was positive for CVA in different distributions.  SHe has been seen undergoing several pain management therapies due to chronic pain that includes oral steroid tapers , other anti inflammatory regimens such as motrin/ibuprofen and Epidurals.    Since her last visit she has lost 35 lbs on Moujaro. She has been feeling better. Her sciatica is better. her blood pressure has dropped.  She denies chest pain  or pressure. She denies dizzy spells, feeling faint or fainting, She   denies palpitations or difficulty breathing while laying flat. She denies lower extremity swelling. She has been trying to control her anxiety. Medication reconciliation performed. She is compliant with her medications.

## 2024-08-15 NOTE — REVIEW OF SYSTEMS
[Dyspnea on exertion] : dyspnea during exertion [Lower Ext Edema] : lower extremity edema [Joint Pain] : joint pain [Joint Stiffness] : joint stiffness [Numbness (Hypoesthesia)] : numbness [Negative] : Heme/Lymph [FreeTextEntry9] : back pain

## 2024-08-23 ENCOUNTER — RX RENEWAL (OUTPATIENT)
Age: 74
End: 2024-08-23

## 2024-09-03 ENCOUNTER — RX RENEWAL (OUTPATIENT)
Age: 74
End: 2024-09-03

## 2024-10-12 ENCOUNTER — RX RENEWAL (OUTPATIENT)
Age: 74
End: 2024-10-12

## 2024-10-12 RX ORDER — TIRZEPATIDE 5 MG/.5ML
5 INJECTION, SOLUTION SUBCUTANEOUS
Qty: 1 | Refills: 3 | Status: ACTIVE | COMMUNITY
Start: 2024-10-12 | End: 1900-01-01

## 2024-11-14 NOTE — DIETITIAN INITIAL EVALUATION ADULT. - PROBLEM SELECTOR PLAN 4
I discussed case with Resident. I agree with Resident’s plan.   I did see  the patient today.  Additional recommendations include: None   Agree with the assessment and plan  for this patient   1. Patient has a history of chronic hyponatremia--unknown etiology.   2. Monitor BMP in AM

## 2024-11-29 ENCOUNTER — RX RENEWAL (OUTPATIENT)
Age: 74
End: 2024-11-29

## 2024-12-02 ENCOUNTER — RX RENEWAL (OUTPATIENT)
Age: 74
End: 2024-12-02

## 2024-12-12 ENCOUNTER — RX RENEWAL (OUTPATIENT)
Age: 74
End: 2024-12-12

## 2024-12-26 ENCOUNTER — RX RENEWAL (OUTPATIENT)
Age: 74
End: 2024-12-26

## 2024-12-31 NOTE — PATIENT PROFILE ADULT. - VISION (WITH CORRECTIVE LENSES IF THE PATIENT USUALLY WEARS THEM):
Normal vision: sees adequately in most situations; can see medication labels, newsprint MOLECULAR PCR

## 2025-01-13 ENCOUNTER — RX RENEWAL (OUTPATIENT)
Age: 75
End: 2025-01-13

## 2025-02-11 ENCOUNTER — RX RENEWAL (OUTPATIENT)
Age: 75
End: 2025-02-11

## 2025-02-24 ENCOUNTER — RX RENEWAL (OUTPATIENT)
Age: 75
End: 2025-02-24

## 2025-03-10 ENCOUNTER — RX RENEWAL (OUTPATIENT)
Age: 75
End: 2025-03-10

## 2025-03-11 ENCOUNTER — NON-APPOINTMENT (OUTPATIENT)
Age: 75
End: 2025-03-11

## 2025-03-11 ENCOUNTER — APPOINTMENT (OUTPATIENT)
Dept: CARDIOLOGY | Facility: CLINIC | Age: 75
End: 2025-03-11
Payer: MEDICARE

## 2025-03-11 VITALS — SYSTOLIC BLOOD PRESSURE: 138 MMHG | DIASTOLIC BLOOD PRESSURE: 76 MMHG

## 2025-03-11 DIAGNOSIS — I10 ESSENTIAL (PRIMARY) HYPERTENSION: ICD-10-CM

## 2025-03-11 PROCEDURE — G2211 COMPLEX E/M VISIT ADD ON: CPT

## 2025-03-11 PROCEDURE — 99214 OFFICE O/P EST MOD 30 MIN: CPT

## 2025-03-11 PROCEDURE — 93000 ELECTROCARDIOGRAM COMPLETE: CPT

## 2025-04-07 ENCOUNTER — RX RENEWAL (OUTPATIENT)
Age: 75
End: 2025-04-07

## 2025-04-14 ENCOUNTER — APPOINTMENT (OUTPATIENT)
Dept: INTERNAL MEDICINE | Facility: CLINIC | Age: 75
End: 2025-04-14

## 2025-05-07 ENCOUNTER — RX RENEWAL (OUTPATIENT)
Age: 75
End: 2025-05-07

## 2025-06-07 ENCOUNTER — RX RENEWAL (OUTPATIENT)
Age: 75
End: 2025-06-07

## 2025-06-12 ENCOUNTER — APPOINTMENT (OUTPATIENT)
Dept: INTERNAL MEDICINE | Facility: CLINIC | Age: 75
End: 2025-06-12
Payer: MEDICARE

## 2025-06-12 VITALS
TEMPERATURE: 98 F | SYSTOLIC BLOOD PRESSURE: 174 MMHG | HEIGHT: 62 IN | DIASTOLIC BLOOD PRESSURE: 86 MMHG | OXYGEN SATURATION: 98 % | HEART RATE: 84 BPM | WEIGHT: 208 LBS | RESPIRATION RATE: 16 BRPM | BODY MASS INDEX: 38.28 KG/M2

## 2025-06-12 VITALS — SYSTOLIC BLOOD PRESSURE: 158 MMHG | DIASTOLIC BLOOD PRESSURE: 88 MMHG

## 2025-06-12 PROBLEM — Z13.820 OSTEOPOROSIS SCREENING: Status: ACTIVE | Noted: 2025-06-12

## 2025-06-12 PROBLEM — Z13.820 OSTEOPOROSIS SCREENING: Status: RESOLVED | Noted: 2024-07-22 | Resolved: 2025-06-12

## 2025-06-12 PROCEDURE — G2211 COMPLEX E/M VISIT ADD ON: CPT

## 2025-06-12 PROCEDURE — 99214 OFFICE O/P EST MOD 30 MIN: CPT

## 2025-07-08 ENCOUNTER — APPOINTMENT (OUTPATIENT)
Dept: CARDIOLOGY | Facility: CLINIC | Age: 75
End: 2025-07-08
Payer: MEDICARE

## 2025-07-08 VITALS
HEART RATE: 98 BPM | SYSTOLIC BLOOD PRESSURE: 128 MMHG | WEIGHT: 198 LBS | OXYGEN SATURATION: 99 % | HEIGHT: 62 IN | DIASTOLIC BLOOD PRESSURE: 81 MMHG | BODY MASS INDEX: 36.44 KG/M2

## 2025-07-08 PROBLEM — R06.09 DYSPNEA ON EXERTION: Status: ACTIVE | Noted: 2021-07-16

## 2025-07-08 PROCEDURE — 99214 OFFICE O/P EST MOD 30 MIN: CPT

## 2025-07-08 PROCEDURE — 93000 ELECTROCARDIOGRAM COMPLETE: CPT

## 2025-07-08 PROCEDURE — G2211 COMPLEX E/M VISIT ADD ON: CPT

## 2025-07-30 ENCOUNTER — APPOINTMENT (OUTPATIENT)
Dept: ENDOCRINOLOGY | Facility: CLINIC | Age: 75
End: 2025-07-30
Payer: MEDICARE

## 2025-07-30 VITALS
HEIGHT: 62 IN | OXYGEN SATURATION: 95 % | BODY MASS INDEX: 35.88 KG/M2 | RESPIRATION RATE: 16 BRPM | DIASTOLIC BLOOD PRESSURE: 70 MMHG | HEART RATE: 93 BPM | WEIGHT: 195 LBS | SYSTOLIC BLOOD PRESSURE: 148 MMHG

## 2025-07-30 DIAGNOSIS — E78.5 HYPERLIPIDEMIA, UNSPECIFIED: ICD-10-CM

## 2025-07-30 DIAGNOSIS — E11.65 TYPE 2 DIABETES MELLITUS WITH HYPERGLYCEMIA: ICD-10-CM

## 2025-07-30 DIAGNOSIS — E66.9 OBESITY, UNSPECIFIED: ICD-10-CM

## 2025-07-30 DIAGNOSIS — E23.6 OTHER DISORDERS OF PITUITARY GLAND: ICD-10-CM

## 2025-07-30 DIAGNOSIS — I10 ESSENTIAL (PRIMARY) HYPERTENSION: ICD-10-CM

## 2025-07-30 DIAGNOSIS — E03.9 HYPOTHYROIDISM, UNSPECIFIED: ICD-10-CM

## 2025-07-30 PROCEDURE — G2211 COMPLEX E/M VISIT ADD ON: CPT

## 2025-07-30 PROCEDURE — 99214 OFFICE O/P EST MOD 30 MIN: CPT

## 2025-08-01 LAB
25(OH)D3 SERPL-MCNC: 39.1 NG/ML
ALBUMIN SERPL ELPH-MCNC: 4.8 G/DL
ALBUMIN, RANDOM URINE: 2.9 MG/DL
ALP BLD-CCNC: 77 U/L
ALT SERPL-CCNC: 16 U/L
ANION GAP SERPL CALC-SCNC: 15 MMOL/L
AST SERPL-CCNC: 17 U/L
BASOPHILS # BLD AUTO: 0.06 K/UL
BASOPHILS NFR BLD AUTO: 1.1 %
BILIRUB SERPL-MCNC: 0.2 MG/DL
BUN SERPL-MCNC: 33 MG/DL
CALCIUM SERPL-MCNC: 9.8 MG/DL
CHLORIDE SERPL-SCNC: 103 MMOL/L
CHOLEST SERPL-MCNC: 130 MG/DL
CO2 SERPL-SCNC: 20 MMOL/L
CREAT SERPL-MCNC: 1.52 MG/DL
CREAT SPEC-SCNC: 107 MG/DL
EGFRCR SERPLBLD CKD-EPI 2021: 36 ML/MIN/1.73M2
EOSINOPHIL # BLD AUTO: 0.17 K/UL
EOSINOPHIL NFR BLD AUTO: 3.3 %
FERRITIN SERPL-MCNC: 55 NG/ML
FOLATE SERPL-MCNC: 18.9 NG/ML
GLUCOSE SERPL-MCNC: 103 MG/DL
HCT VFR BLD CALC: 34.4 %
HDLC SERPL-MCNC: 51 MG/DL
HGB BLD-MCNC: 10.6 G/DL
IMM GRANULOCYTES NFR BLD AUTO: 0.4 %
IRON SATN MFR SERPL: 20 %
IRON SERPL-MCNC: 71 UG/DL
LDLC SERPL-MCNC: 57 MG/DL
LYMPHOCYTES # BLD AUTO: 1.27 K/UL
LYMPHOCYTES NFR BLD AUTO: 24.3 %
MAN DIFF?: NORMAL
MCHC RBC-ENTMCNC: 30.1 PG
MCHC RBC-ENTMCNC: 30.8 G/DL
MCV RBC AUTO: 97.7 FL
MICROALBUMIN/CREAT 24H UR-RTO: 27 MG/G
MONOCYTES # BLD AUTO: 0.42 K/UL
MONOCYTES NFR BLD AUTO: 8 %
NEUTROPHILS # BLD AUTO: 3.29 K/UL
NEUTROPHILS NFR BLD AUTO: 62.9 %
NONHDLC SERPL-MCNC: 79 MG/DL
PLATELET # BLD AUTO: 284 K/UL
POTASSIUM SERPL-SCNC: 5.2 MMOL/L
PROT SERPL-MCNC: 6.9 G/DL
RBC # BLD: 3.52 M/UL
RBC # FLD: 13 %
SODIUM SERPL-SCNC: 138 MMOL/L
T4 FREE SERPL-MCNC: 1.8 NG/DL
TIBC SERPL-MCNC: 348 UG/DL
TRIGL SERPL-MCNC: 126 MG/DL
TSH SERPL-ACNC: 0.7 UIU/ML
UIBC SERPL-MCNC: 277 UG/DL
VIT B12 SERPL-MCNC: 346 PG/ML
WBC # FLD AUTO: 5.23 K/UL

## 2025-08-08 ENCOUNTER — RX RENEWAL (OUTPATIENT)
Age: 75
End: 2025-08-08

## 2025-08-28 RX ORDER — TIRZEPATIDE 7.5 MG/.5ML
7.5 INJECTION, SOLUTION SUBCUTANEOUS
Qty: 3 | Refills: 3 | Status: ACTIVE | COMMUNITY
Start: 2025-08-27 | End: 1900-01-01